# Patient Record
Sex: FEMALE | Race: WHITE | NOT HISPANIC OR LATINO | Employment: FULL TIME | URBAN - METROPOLITAN AREA
[De-identification: names, ages, dates, MRNs, and addresses within clinical notes are randomized per-mention and may not be internally consistent; named-entity substitution may affect disease eponyms.]

---

## 2018-04-18 ENCOUNTER — APPOINTMENT (OUTPATIENT)
Dept: RADIOLOGY | Facility: CLINIC | Age: 43
End: 2018-04-18
Payer: COMMERCIAL

## 2018-04-18 ENCOUNTER — OFFICE VISIT (OUTPATIENT)
Dept: URGENT CARE | Facility: CLINIC | Age: 43
End: 2018-04-18
Payer: COMMERCIAL

## 2018-04-18 VITALS
HEIGHT: 63 IN | SYSTOLIC BLOOD PRESSURE: 124 MMHG | WEIGHT: 170 LBS | DIASTOLIC BLOOD PRESSURE: 82 MMHG | HEART RATE: 105 BPM | TEMPERATURE: 98.6 F | RESPIRATION RATE: 18 BRPM | OXYGEN SATURATION: 96 % | BODY MASS INDEX: 30.12 KG/M2

## 2018-04-18 DIAGNOSIS — S99.912A LEFT ANKLE INJURY, INITIAL ENCOUNTER: Primary | ICD-10-CM

## 2018-04-18 PROCEDURE — 73610 X-RAY EXAM OF ANKLE: CPT

## 2018-04-18 PROCEDURE — 73630 X-RAY EXAM OF FOOT: CPT

## 2018-04-18 PROCEDURE — 99213 OFFICE O/P EST LOW 20 MIN: CPT | Performed by: PHYSICIAN ASSISTANT

## 2018-04-18 RX ORDER — CETIRIZINE HYDROCHLORIDE 10 MG/1
10 TABLET ORAL DAILY
COMMUNITY
End: 2019-10-03 | Stop reason: ALTCHOICE

## 2018-04-18 RX ORDER — IBUPROFEN 800 MG/1
800 TABLET ORAL EVERY 6 HOURS PRN
Qty: 30 TABLET | Refills: 0 | Status: SHIPPED | OUTPATIENT
Start: 2018-04-18 | End: 2019-10-03 | Stop reason: ALTCHOICE

## 2018-04-18 NOTE — PATIENT INSTRUCTIONS
Office x-ray ankle/foot performed-no acute osseous abnormality visualized  Most likely ankle sprain  Left foot air cast applied to ankle  Ibuprofen 800 mg prescription sent to pharmacy  Rest, ice, elevate leg  Follow up with orthopedic physician if symptoms do not improve in approximately 1 week  Follow up with PCP in 3-5 days  Proceed to  ER if symptoms worsen  Ankle Sprain   AMBULATORY CARE:   An ankle sprain  happens when 1 or more ligaments in your ankle joint stretch or tear  Ligaments are tough tissues that connect bones  Ligaments support your joints and keep your bones in place  Common symptoms include the following:   · Trouble moving your ankle or foot    · Pain when you touch or put weight on your ankle    · Bruised, swollen, or misshapen ankle  Seek care immediately if:   · You have severe pain in your ankle  · Your foot or toes are cold or numb  · Your ankle becomes more weak or unstable (wobbly)  · You are unable to put any weight on your ankle or foot  · Your swelling has increased or returned  Contact your healthcare provider if:   · Your pain does not go away, even after treatment  · You have questions or concerns about your condition or care  Treatment:   · Medicines      ¨ NSAIDs , such as ibuprofen, help decrease swelling, pain, and fever  This medicine is available with or without a doctor's order  NSAIDs can cause stomach bleeding or kidney problems in certain people  If you take blood thinner medicine, always ask your healthcare provider if NSAIDs are safe for you  Always read the medicine label and follow directions  ¨ Acetaminophen  decreases pain  It is available without a doctor's order  Ask how much to take and how often to take it  Follow directions  Acetaminophen can cause liver damage if not taken correctly  ¨ Prescription pain medicine  may be given  Ask how to take this medicine safely      · Surgery  may be needed to repair or replace a torn ligament if your sprain does not heal with other treatments  Your healthcare provider may use screws to attach the bones in your ankle together  The screws may help support your ankle and make it stable  Ask your healthcare provider for more information about surgery to treat your ankle sprain  Self care:   · Use support devices,  such as a brace, cast, or splint, may be needed to limit your movement and protect your joint  You may need to use crutches to decrease your pain as you move around  · Go to physical therapy as directed  A physical therapist teaches you exercises to help improve movement and strength, and to decrease pain  · Rest  your ankle so that it can heal  Return to normal activities as directed  · Apply ice on your ankle for 15 to 20 minutes every hour or as directed  Use an ice pack, or put crushed ice in a plastic bag  Cover it with a towel  Ice helps prevent tissue damage and decreases swelling and pain  · Compress  your ankle  Ask if you should wrap an elastic bandage around your injured ligament  An elastic bandage provides support and helps decrease swelling and movement so your joint can heal  Wear as long as directed  · Elevate  your ankle above the level of your heart as often as you can  This will help decrease swelling and pain  Prop your ankle on pillows or blankets to keep it elevated comfortably  Prevent another ankle sprain:   · Let your ankle heal   Find out how long your ligament needs to heal  Do not do any physical activity until your healthcare provider says it is okay  If you start activity too soon, you may develop a more serious injury  · Always warm up and stretch  before you exercise or play sports  · Use the right equipment  Always wear shoes that fit well and are made for the activity that you are doing  You may also need ankle supports, elbow and knee pads, or braces    Follow up with your healthcare provider as directed:  Write down your questions so you remember to ask them during your visits  © 2017 [employer] Information is for End User's use only and may not be sold, redistributed or otherwise used for commercial purposes  All illustrations and images included in CareNotes® are the copyrighted property of [employer]  or Emeka Quintanilla  The above information is an  only  It is not intended as medical advice for individual conditions or treatments  Talk to your doctor, nurse or pharmacist before following any medical regimen to see if it is safe and effective for you

## 2018-04-18 NOTE — PROGRESS NOTES
[facility]        NAME: Jack Cheema is a 41 y o  female  : 1975    MRN: [id number]  DATE: 2018  TIME: 9:56 AM    Assessment and Plan   Left ankle injury, initial encounter [S99 912A]  1  Left ankle injury, initial encounter  XR ankle 3+ vw left    XR foot 3+ vw left         Patient Instructions   In office x-ray ankle/foot performed-no acute osseous abnormality  Left foot air cast applied to ankle  Ibuprofen 800 mg prescription sent to pharmacy  Rest, ice, elevate leg  Follow up with orthopedic physician if symptoms do not improve in approximately 1 week  Follow up with PCP in 3-5 days  Proceed to  ER if symptoms worsen  Chief Complaint     Chief Complaint   Patient presents with    Ankle Pain     L ankle  twisted her ankle this morning coming off step  History of Present Illness   The patient is a 44-year-old female who presents with left ankle pain after an injury that occurred this morning  She states that she was stepping off of a step and twisted her ankle  She had immediate pain and thinks that she heard a "pop" when it happened  Positive swelling and mild redness  She denies any prior injury to her ankle in the past   Negative numbness and tingling  Negative radiating pain  Decreased range of motion secondary to pain  Negative syncope  Negative head injury  She denies any pain or injury to other parts of her body  HPI    Review of Systems   Review of Systems   Constitutional: Negative for fever  Respiratory: Negative for shortness of breath  Cardiovascular: Negative for chest pain  Musculoskeletal: Positive for arthralgias, gait problem and joint swelling  Skin: Negative for color change, rash and wound  Neurological: Negative for dizziness, syncope, weakness, light-headedness, numbness and headaches  All other systems reviewed and are negative          Current Medications       Current Outpatient Prescriptions:     cetirizine (ZyrTEC) 10 mg tablet, Take 10 mg by mouth daily, Disp: , Rfl:     Current Allergies     Allergies as of 2018    (No Known Allergies)            The following portions of the patient's history were reviewed and updated as appropriate: allergies, current medications, past family history, past medical history, past social history, past surgical history and problem list      Past Medical History:   Diagnosis Date    Arthritis        Past Surgical History:   Procedure Laterality Date     SECTION      x2       Family History   Problem Relation Age of Onset    No Known Problems Mother     Heart disease Father          Medications have been verified  Objective   /82 (BP Location: Left arm, Patient Position: Sitting, Cuff Size: Standard)   Pulse 105   Temp 98 6 °F (37 °C) (Tympanic)   Resp 18   Ht 5' 3" (1 6 m)   Wt 77 1 kg (170 lb)   LMP 2018   SpO2 96%   BMI 30 11 kg/m²        Physical Exam     Physical Exam   Constitutional: She is oriented to person, place, and time  She appears well-developed and well-nourished  No distress  HENT:   Head: Normocephalic and atraumatic  Eyes: Conjunctivae and EOM are normal  Pupils are equal, round, and reactive to light  Right eye exhibits no discharge  Left eye exhibits no discharge  No scleral icterus  Musculoskeletal:        Left ankle: She exhibits decreased range of motion  She exhibits no swelling, no ecchymosis, no deformity, no laceration and normal pulse  Tenderness  Lateral malleolus tenderness found  No medial malleolus and no proximal fibula tenderness found  Achilles tendon normal         Feet:    Neurological: She is alert and oriented to person, place, and time  Skin: Skin is warm and dry  No rash noted  She is not diaphoretic  No erythema  No pallor

## 2019-10-03 ENCOUNTER — APPOINTMENT (OUTPATIENT)
Dept: LAB | Facility: CLINIC | Age: 44
End: 2019-10-03
Payer: COMMERCIAL

## 2019-10-03 ENCOUNTER — OFFICE VISIT (OUTPATIENT)
Dept: FAMILY MEDICINE CLINIC | Facility: CLINIC | Age: 44
End: 2019-10-03
Payer: COMMERCIAL

## 2019-10-03 VITALS
TEMPERATURE: 99.1 F | OXYGEN SATURATION: 98 % | HEART RATE: 92 BPM | WEIGHT: 177 LBS | BODY MASS INDEX: 30.22 KG/M2 | HEIGHT: 64 IN | SYSTOLIC BLOOD PRESSURE: 120 MMHG | DIASTOLIC BLOOD PRESSURE: 82 MMHG

## 2019-10-03 DIAGNOSIS — Z12.4 SCREENING FOR CERVICAL CANCER: ICD-10-CM

## 2019-10-03 DIAGNOSIS — Z12.39 SCREENING FOR BREAST CANCER: ICD-10-CM

## 2019-10-03 DIAGNOSIS — Z23 NEED FOR TDAP VACCINATION: ICD-10-CM

## 2019-10-03 DIAGNOSIS — Z00.00 HEALTHCARE MAINTENANCE: Primary | ICD-10-CM

## 2019-10-03 PROBLEM — M25.579 ANKLE PAIN: Status: RESOLVED | Noted: 2018-04-18 | Resolved: 2019-10-03

## 2019-10-03 PROBLEM — M17.0 BILATERAL PRIMARY OSTEOARTHRITIS OF KNEE: Status: ACTIVE | Noted: 2019-10-03

## 2019-10-03 PROBLEM — M25.579 ANKLE PAIN: Status: ACTIVE | Noted: 2018-04-18

## 2019-10-03 LAB
ALBUMIN SERPL BCP-MCNC: 4.1 G/DL (ref 3.5–5)
ALP SERPL-CCNC: 71 U/L (ref 46–116)
ALT SERPL W P-5'-P-CCNC: 63 U/L (ref 12–78)
ANION GAP SERPL CALCULATED.3IONS-SCNC: 6 MMOL/L (ref 4–13)
AST SERPL W P-5'-P-CCNC: 39 U/L (ref 5–45)
BILIRUB SERPL-MCNC: 0.55 MG/DL (ref 0.2–1)
BUN SERPL-MCNC: 8 MG/DL (ref 5–25)
CALCIUM SERPL-MCNC: 8.9 MG/DL (ref 8.3–10.1)
CHLORIDE SERPL-SCNC: 107 MMOL/L (ref 100–108)
CHOLEST SERPL-MCNC: 189 MG/DL (ref 50–200)
CO2 SERPL-SCNC: 26 MMOL/L (ref 21–32)
CREAT SERPL-MCNC: 0.81 MG/DL (ref 0.6–1.3)
GFR SERPL CREATININE-BSD FRML MDRD: 90 ML/MIN/1.73SQ M
GLUCOSE P FAST SERPL-MCNC: 73 MG/DL (ref 65–99)
HDLC SERPL-MCNC: 42 MG/DL (ref 40–60)
LDLC SERPL CALC-MCNC: 124 MG/DL (ref 0–100)
NONHDLC SERPL-MCNC: 147 MG/DL
POTASSIUM SERPL-SCNC: 4.3 MMOL/L (ref 3.5–5.3)
PROT SERPL-MCNC: 7.5 G/DL (ref 6.4–8.2)
SODIUM SERPL-SCNC: 139 MMOL/L (ref 136–145)
TRIGL SERPL-MCNC: 113 MG/DL

## 2019-10-03 PROCEDURE — 80061 LIPID PANEL: CPT

## 2019-10-03 PROCEDURE — 80053 COMPREHEN METABOLIC PANEL: CPT

## 2019-10-03 PROCEDURE — 99386 PREV VISIT NEW AGE 40-64: CPT | Performed by: FAMILY MEDICINE

## 2019-10-03 PROCEDURE — 36415 COLL VENOUS BLD VENIPUNCTURE: CPT

## 2019-10-03 PROCEDURE — 90471 IMMUNIZATION ADMIN: CPT

## 2019-10-03 PROCEDURE — 90715 TDAP VACCINE 7 YRS/> IM: CPT

## 2019-10-03 PROCEDURE — G0145 SCR C/V CYTO,THINLAYER,RESCR: HCPCS | Performed by: FAMILY MEDICINE

## 2019-10-03 PROCEDURE — 87624 HPV HI-RISK TYP POOLED RSLT: CPT | Performed by: FAMILY MEDICINE

## 2019-10-03 RX ORDER — MELOXICAM 15 MG/1
TABLET ORAL DAILY
COMMUNITY
End: 2019-10-03 | Stop reason: ALTCHOICE

## 2019-10-03 NOTE — PROGRESS NOTES
Alison Lozano 1975 female MRN: [id number]    Family Medicine Annual GYN Visit    ASSESSMENT/PLAN  Problem List Items Addressed This Visit        Other    Healthcare maintenance - Primary    BMI 30 0-30 9,adult    Relevant Orders    Comprehensive metabolic panel    Lipid panel    Screening for cervical cancer    Relevant Orders    Liquid-based pap, screening      Other Visit Diagnoses     Screening for breast cancer        Relevant Orders    Mammo screening bilateral w 3d & cad          Exam benign  PAP collected  Pt deferred STD testing  Mammo script provided  Future Appointments   Date Time Provider Anahy Sharma   10/3/2019  9:00 AM DO [facility]          SUBJECTIVE  CC: Establish Care (physical )      HPI:  Alison Lozano is a 45 y o  female who presents for annual gynecologic exam      Gynecologic History  OB History        2    Para   2    Term                AB   0    Living           SAB   0    TAB        Ectopic        Multiple        Live Births                   No LMP recorded  Last Pap: 11 years ago  Results were: normal  Last mammogram: N/A  Results were: N/A        Review of Systems   Genitourinary: Negative for dyspareunia, dysuria, frequency, menstrual problem, pelvic pain, urgency, vaginal discharge and vaginal pain  Historical Information   The patient history was reviewed as follows:    Past Medical History:   Diagnosis Date    Arthritis      Past Surgical History:   Procedure Laterality Date     SECTION      x2    FOOT SURGERY       Family History   Problem Relation Age of Onset    No Known Problems Mother     Heart disease Father       Social History       Medications:   No current outpatient medications on file      No Known Allergies    OBJECTIVE  Vitals:   Vitals:    10/03/19 0827   BP: 120/82   BP Location: Left arm   Patient Position: Sitting   Cuff Size: Adult   Pulse: 92   Temp: 99 1 °F (37 3 °C)   SpO2: 98% Weight: 80 3 kg (177 lb)   Height: 5' 3 5" (1 613 m)         Physical Exam   Pulmonary/Chest:   Pt deferred breast exam     Genitourinary: Rectum normal  There is no rash or lesion on the right labia  There is no rash or lesion on the left labia  Uterus is not tender  Cervix exhibits discharge (scant thin white)  Cervix exhibits no motion tenderness  Right adnexum displays no mass, no tenderness and no fullness  Left adnexum displays no mass, no tenderness and no fullness  No bleeding in the vagina  No vaginal discharge found                    DO [facility]   10/3/2019  8:57 AM

## 2019-10-03 NOTE — PROGRESS NOTES
Alton Paget 1975 female MRN: [id number]      ASSESSMENT/PLAN  Problem List Items Addressed This Visit        Other    Healthcare maintenance - Primary    BMI 30 0-30 9,adult    Relevant Orders    Comprehensive metabolic panel    Lipid panel    Screening for cervical cancer    Relevant Orders    Liquid-based pap, screening      Other Visit Diagnoses     Screening for breast cancer        Relevant Orders    Mammo screening bilateral w 3d & cad    Need for Tdap vaccination        Relevant Orders    TDAP VACCINE GREATER THAN OR EQUAL TO 8YO IM (Completed)        BMI 30: CMP + Lipids   BP WNL     BMI Counseling: Body mass index is 30 86 kg/m²  The BMI is above normal  Nutrition recommendations include reducing portion sizes, decreasing overall calorie intake, 3-5 servings of fruits/vegetables daily, reducing fast food intake, consuming healthier snacks, decreasing soda and/or juice intake, moderation in carbohydrate intake, increasing intake of lean protein and reducing intake of saturated fat and trans fat  Exercise recommendations include exercising 3-5 times per week  No future appointments  SUBJECTIVE  CC: Establish Care (physical )      HPI:  Alton Paget is a 45 y o  female who presents to establish care  History reviewed and updated as below  No acute concerns  Last PAP 11 years ago       Review of Systems   Constitutional: Negative for unexpected weight change  HENT: Negative for congestion, ear pain, rhinorrhea and sore throat  Eyes: Negative for visual disturbance  Respiratory: Negative for cough and shortness of breath  Cardiovascular: Negative for chest pain, palpitations and leg swelling  Gastrointestinal: Negative for abdominal pain, constipation and diarrhea  Musculoskeletal: Positive for arthralgias  Neurological: Negative for dizziness, light-headedness and headaches         Historical Information   The patient history was reviewed and updated as follows:    Past Medical History:   Diagnosis Date    Arthritis      Past Surgical History:   Procedure Laterality Date     SECTION      x2    FOOT SURGERY       Family History   Problem Relation Age of Onset    No Known Problems Mother     Heart disease Father       Social History   Social History     Substance and Sexual Activity   Alcohol Use Yes    Frequency: Monthly or less     Social History     Substance and Sexual Activity   Drug Use No     Social History     Tobacco Use   Smoking Status Passive Smoke Exposure - Never Smoker   Smokeless Tobacco Never Used       Medications:   No current outpatient medications on file  No Known Allergies    OBJECTIVE    Vitals:   Vitals:    10/03/19 0827   BP: 120/82   BP Location: Left arm   Patient Position: Sitting   Cuff Size: Adult   Pulse: 92   Temp: 99 1 °F (37 3 °C)   SpO2: 98%   Weight: 80 3 kg (177 lb)   Height: 5' 3 5" (1 613 m)           Physical Exam   Constitutional: She appears well-developed and well-nourished  No distress  HENT:   Head: Normocephalic and atraumatic  Right Ear: External ear normal    Left Ear: External ear normal    Nose: Nose normal    Mouth/Throat: Oropharynx is clear and moist    Eyes: Conjunctivae are normal    Neck: No thyromegaly present  Cardiovascular: Normal rate and regular rhythm  Pulmonary/Chest: Effort normal and breath sounds normal  No respiratory distress  Abdominal: Soft  Bowel sounds are normal  She exhibits no distension  There is no tenderness  Musculoskeletal: She exhibits no edema  Lymphadenopathy:     She has no cervical adenopathy  Neurological: She is alert  Skin: Skin is warm and dry  Psychiatric: She has a normal mood and affect  Vitals reviewed                   DO [facility]   10/3/2019  9:04 AM

## 2019-10-04 LAB
HPV HR 12 DNA CVX QL NAA+PROBE: NEGATIVE
HPV16 DNA CVX QL NAA+PROBE: NEGATIVE
HPV18 DNA CVX QL NAA+PROBE: NEGATIVE

## 2019-10-09 LAB
LAB AP GYN PRIMARY INTERPRETATION: NORMAL
Lab: NORMAL

## 2020-09-02 ENCOUNTER — TELEPHONE (OUTPATIENT)
Dept: DERMATOLOGY | Facility: CLINIC | Age: 45
End: 2020-09-02

## 2020-09-02 NOTE — TELEPHONE ENCOUNTER
Return call made to patient, LVM asking for her to call us back if she would like to be added on the wait list

## 2020-09-19 ENCOUNTER — OFFICE VISIT (OUTPATIENT)
Dept: URGENT CARE | Facility: CLINIC | Age: 45
End: 2020-09-19
Payer: COMMERCIAL

## 2020-09-19 VITALS
OXYGEN SATURATION: 97 % | RESPIRATION RATE: 18 BRPM | BODY MASS INDEX: 30.3 KG/M2 | DIASTOLIC BLOOD PRESSURE: 96 MMHG | HEART RATE: 91 BPM | WEIGHT: 171 LBS | HEIGHT: 63 IN | TEMPERATURE: 98 F | SYSTOLIC BLOOD PRESSURE: 140 MMHG

## 2020-09-19 DIAGNOSIS — M54.2 NECK PAIN: Primary | ICD-10-CM

## 2020-09-19 PROCEDURE — 99213 OFFICE O/P EST LOW 20 MIN: CPT | Performed by: PHYSICIAN ASSISTANT

## 2020-09-19 RX ORDER — METHYLPREDNISOLONE 4 MG/1
TABLET ORAL
Qty: 1 EACH | Refills: 0 | Status: SHIPPED | OUTPATIENT
Start: 2020-09-19

## 2020-09-19 RX ORDER — CYCLOBENZAPRINE HCL 10 MG
10 TABLET ORAL 3 TIMES DAILY PRN
Qty: 21 TABLET | Refills: 0 | Status: SHIPPED | OUTPATIENT
Start: 2020-09-19

## 2020-09-19 NOTE — PROGRESS NOTES
[facility]        NAME: Antwon Clark is a 39 y o  female  : 1975    MRN: [id number]  DATE: 2020  TIME: 3:17 PM    Assessment and Plan   Neck pain [M54 2]  1  Neck pain  methylPREDNISolone 4 MG tablet therapy pack    cyclobenzaprine (FLEXERIL) 10 mg tablet     Discussed with patient if rash appears she is to f/u with pcp or return  Likely MSK vs shingles Pain consistent with shingles however no rash  Will tx with steroids and muscle relaxer and given close f/u instructions    Patient Instructions     Follow up with PCP in 3-5 days  Proceed to  ER if symptoms worsen  Chief Complaint     Chief Complaint   Patient presents with    Shoulder Pain     Pt c/o left shoulder pain that is traveling up into neck, no injury, pain started yesterday and getting worse  History of Present Illness       27-year-old female presents for evaluation of left upper shoulder pain  Patient states he had sharp and burning pain in the right upper shoulder into her neck  Patient states she has no known injury at this time or previous injury  Patient denies any rash  Denies fever chest pain      Review of Systems   Review of Systems   Constitutional: Negative for chills, fatigue and fever  HENT: Negative for congestion, ear pain, sinus pain, sore throat and trouble swallowing  Eyes: Negative for pain, discharge and redness  Respiratory: Negative for cough, chest tightness, shortness of breath and wheezing  Cardiovascular: Negative for chest pain, palpitations and leg swelling  Gastrointestinal: Negative for abdominal pain, diarrhea, nausea and vomiting  Musculoskeletal: Positive for neck pain  Negative for arthralgias, joint swelling and myalgias  Skin: Negative for rash  Neurological: Negative for dizziness, weakness, numbness and headaches           Current Medications       Current Outpatient Medications:     cyclobenzaprine (FLEXERIL) 10 mg tablet, Take 1 tablet (10 mg total) by mouth 3 (three) times a day as needed for muscle spasms, Disp: 21 tablet, Rfl: 0    methylPREDNISolone 4 MG tablet therapy pack, Use as directed on package, Disp: 1 each, Rfl: 0    Current Allergies     Allergies as of 2020    (No Known Allergies)            The following portions of the patient's history were reviewed and updated as appropriate: allergies, current medications, past family history, past medical history, past social history, past surgical history and problem list      Past Medical History:   Diagnosis Date    Arthritis        Past Surgical History:   Procedure Laterality Date     SECTION      x2    FOOT SURGERY         Family History   Problem Relation Age of Onset    No Known Problems Mother     Heart disease Father          Medications have been verified  Objective   /96   Pulse 91   Temp 98 °F (36 7 °C) (Tympanic)   Resp 18   Ht 5' 3" (1 6 m)   Wt 77 6 kg (171 lb)   SpO2 97%   BMI 30 29 kg/m²        Physical Exam     Physical Exam  Constitutional:       General: She is not in acute distress  Appearance: She is well-developed  Cardiovascular:      Rate and Rhythm: Normal rate and regular rhythm  Heart sounds: Normal heart sounds  Pulmonary:      Effort: Pulmonary effort is normal       Breath sounds: Normal breath sounds     Musculoskeletal:        Arms:

## 2020-10-26 ENCOUNTER — TELEPHONE (OUTPATIENT)
Dept: FAMILY MEDICINE CLINIC | Facility: CLINIC | Age: 45
End: 2020-10-26

## 2022-04-03 ENCOUNTER — OFFICE VISIT (OUTPATIENT)
Dept: URGENT CARE | Facility: CLINIC | Age: 47
End: 2022-04-03
Payer: COMMERCIAL

## 2022-04-03 VITALS
RESPIRATION RATE: 14 BRPM | HEART RATE: 88 BPM | TEMPERATURE: 97.8 F | DIASTOLIC BLOOD PRESSURE: 78 MMHG | SYSTOLIC BLOOD PRESSURE: 120 MMHG | OXYGEN SATURATION: 98 %

## 2022-04-03 DIAGNOSIS — S29.012A UPPER BACK STRAIN, INITIAL ENCOUNTER: Primary | ICD-10-CM

## 2022-04-03 DIAGNOSIS — S16.1XXA STRAIN OF NECK MUSCLE, INITIAL ENCOUNTER: ICD-10-CM

## 2022-04-03 PROCEDURE — 99213 OFFICE O/P EST LOW 20 MIN: CPT | Performed by: EMERGENCY MEDICINE

## 2022-04-03 RX ORDER — IBUPROFEN 800 MG/1
800 TABLET ORAL 2 TIMES DAILY
Qty: 20 TABLET | Refills: 0 | Status: SHIPPED | OUTPATIENT
Start: 2022-04-03 | End: 2022-04-13

## 2022-04-03 RX ORDER — CYCLOBENZAPRINE HCL 10 MG
10 TABLET ORAL
Qty: 10 TABLET | Refills: 0 | Status: SHIPPED | OUTPATIENT
Start: 2022-04-03

## 2022-04-03 NOTE — PROGRESS NOTES
[facility]        NAME: Donna Phillips is a 41 y o  female  : 1975    MRN: [id number]  DATE: April 3, 2022  TIME: 10:35 AM    Assessment and Plan   Upper back strain, initial encounter [P92 [id number]]  1  Upper back strain, initial encounter  ibuprofen (MOTRIN) 800 mg tablet    cyclobenzaprine (FLEXERIL) 10 mg tablet   2  Strain of neck muscle, initial encounter  ibuprofen (MOTRIN) 800 mg tablet    cyclobenzaprine (FLEXERIL) 10 mg tablet         Patient Instructions   Patient Instructions     1  Ice x 20 min at a time x 3-4 x / day x 3 days, then heat thereafter  2  Try to avoid bending or lifting  3  F/u with PCP or ortho Dr  If pain continues in 3-5 days  4  Check for any blister like rash       Thoracic Back Strain   WHAT YOU NEED TO KNOW:   A thoracic back strain is a muscle or tendon injury in your upper or middle back  You may have pain, muscle spasms, swelling, or stiffness  A mild strain may cause minor pain that goes away in a few days  A more severe strain may cause the muscle or tendon to tear  There is a very small chance you may need surgery to fix the tear  DISCHARGE INSTRUCTIONS:   Call your local emergency number (911 in the [address] [place] [address],[address]) for any of the following:   · You have chest pain or shortness of breath  Return to the emergency department if:   · You have severe pain, or pain that spreads from your back to other areas  · You have new or increased swelling or redness in the injured area  Call your doctor if:   · You have questions or concerns about your condition or care  Medicines: You may need any of the following:  · Prescription pain medicine  may be given  Ask your healthcare provider how to take this medicine safely  Some prescription pain medicines contain acetaminophen  Do not take other medicines that contain acetaminophen without talking to your healthcare provider  Too much acetaminophen may cause liver damage   Prescription pain medicine may cause constipation  Ask your healthcare provider how to prevent or treat constipation  · NSAIDs , such as ibuprofen, help decrease swelling, pain, and fever  This medicine is available with or without a doctor's order  NSAIDs can cause stomach bleeding or kidney problems in certain people  If you take blood thinner medicine, always ask your healthcare provider if NSAIDs are safe for you  Always read the medicine label and follow directions  · Muscle relaxers  help prevent or treat spasms  · Take your medicine as directed  Contact your healthcare provider if you think your medicine is not helping or if you have side effects  Tell him or her if you are allergic to any medicine  Keep a list of the medicines, vitamins, and herbs you take  Include the amounts, and when and why you take them  Bring the list or the pill bottles to follow-up visits  Carry your medicine list with you in case of an emergency  Self-care:   · Rest as directed  Move slowly and carefully  Do not lift heavy objects  · Apply ice or heat as directed  Ice decreases pain and swelling and may help decrease tissue damage  Heat helps decrease muscle spasms  Your healthcare provider may tell you to apply only ice for the first 24 hours to help reduce swelling  Apply ice or heat to the area for 20 minutes every hour, or as directed  Ask how many times to do this each day, and for how many days  · Use an elastic wrap or back brace as directed  These will help keep the injured area from moving so it can heal          · Go to physical therapy as directed  A physical therapist can teach you exercises to help strengthen your back  He or she can also teach you safe ways to bend and move so you do not cause more injury  Prevent another thoracic back strain:   · Lift objects carefully  Ask someone to help you lift heavy objects  If you must lift an object by yourself, do not use your back muscles to lift  Lift with your legs           · Check your posture  Keep your upper body lifted and your head up  Poor posture can cause back strain or make it worse  Adjust your position if you work in front of a computer  You may need arm or wrist supports or change the height of your chair  · Exercise as directed  Exercise can help strengthen your muscles and make you more flexible  Do not exercise or play sports when you are tired  Always warm up before you start and cool down when you finish  · Stretch your muscles as directed  Keep your muscles limber by stretching every day  Stretch before you exercise  Follow up with your doctor as directed: You may need more tests to check for other injuries or to see how your injury is healing  You may also need to see a specialist  Write down your questions so you remember to ask them during your visits  © Copyright [employer] 2022 Information is for End User's use only and may not be sold, redistributed or otherwise used for commercial purposes  All illustrations and images included in CareNotes® are the copyrighted property of A D A M , Inc  or [employer]   The above information is an  only  It is not intended as medical advice for individual conditions or treatments  Talk to your doctor, nurse or pharmacist before following any medical regimen to see if it is safe and effective for you  Cervical Strain   WHAT YOU NEED TO KNOW:   A cervical strain is a stretched or torn muscle or tendon in your neck  Tendons are strong tissues that connect muscles to bones  DISCHARGE INSTRUCTIONS:   Return to the emergency department if:   · You have pain or numbness from your shoulder down to your hand  · You have problems with your vision, hearing, or balance  · You feel confused or cannot concentrate  · You have problems with movement and strength  Call your doctor if:   · You have increased swelling or pain in your neck       · You have questions or concerns about your condition or care  Medicines: You may need any of the following:  · Acetaminophen  decreases pain and fever  It is available without a doctor's order  Ask how much to take and how often to take it  Follow directions  Read the labels of all other medicines you are using to see if they also contain acetaminophen, or ask your doctor or pharmacist  Acetaminophen can cause liver damage if not taken correctly  Do not use more than 4 grams (4,000 milligrams) total of acetaminophen in one day  · NSAIDs , such as ibuprofen, help decrease swelling, pain, and fever  This medicine is available with or without a doctor's order  NSAIDs can cause stomach bleeding or kidney problems in certain people  If you take blood thinner medicine, always ask your healthcare provider if NSAIDs are safe for you  Always read the medicine label and follow directions  · Muscle relaxers  help decrease pain and muscle spasms  · Prescription pain medicine  may be given  Ask your healthcare provider how to take this medicine safely  Some prescription pain medicines contain acetaminophen  Do not take other medicines that contain acetaminophen without talking to your healthcare provider  Too much acetaminophen may cause liver damage  Prescription pain medicine may cause constipation  Ask your healthcare provider how to prevent or treat constipation  · Take your medicine as directed  Contact your healthcare provider if you think your medicine is not helping or if you have side effects  Tell him or her if you are allergic to any medicine  Keep a list of the medicines, vitamins, and herbs you take  Include the amounts, and when and why you take them  Bring the list or the pill bottles to follow-up visits  Carry your medicine list with you in case of an emergency  Manage your symptoms:   · Apply heat  on your neck for 15 to 20 minutes, 4 to 6 times a day or as directed  Heat helps decrease pain, stiffness, and muscle spasms      · Begin gentle neck exercises  as soon as you can move your neck without pain  Exercises will help decrease stiffness and improve the strength and movement of your neck  Ask your healthcare provider what kind of exercises you should do  · Gradually return to your usual activities as directed  Stop if you have pain  Avoid activities that can cause more damage to your neck, such as heavy lifting or strenuous exercise  · Sleep without a pillow  to help decrease pain  Instead, roll a small towel tightly and place it under your neck  · Go to physical therapy as directed  A physical therapist teaches you exercises to help improve movement and strength, and to decrease pain  Prevent another neck injury:   · Drive safely  Make sure everyone in your car wears a seatbelt  A seatbelt can save your life if you are in an accident  Do not use your cell phone when you are driving  This could distract you and cause an accident  Pull over if you need to make a call or send a text message  · Wear helmets, lifejackets, and protective gear  Always wear a helmet when you ride a bike or motorcycle, go skiing, or play sports that could cause a head injury  Wear protective equipment when you play sports  Wear a lifejacket when you are on a boat or doing water sports  Follow up with your doctor as directed: You may be referred to an orthopedist or physical therapies  Write down your questions so you remember to ask them during your visits  © Copyright [employer] 2022 Information is for End User's use only and may not be sold, redistributed or otherwise used for commercial purposes  All illustrations and images included in CareNotes® are the copyrighted property of A D A M , Inc  or Maryan Tilley  The above information is an  only  It is not intended as medical advice for individual conditions or treatments   Talk to your doctor, nurse or pharmacist before following any medical regimen to see if it is safe and effective for you  Follow up with PCP in 3-5 days  Proceed to  ER if symptoms worsen  Chief Complaint     Chief Complaint   Patient presents with    Back Pain     upper back x6 days  Doing a lot of bending on monday  Pain with burning sensation  Took ibuprofen with no relief  History of Present Illness       28-year-old white female with a chief complaint of pain and neck pain  Patient describes it as a burning pain  Patient states she was bending over all day Monday taking care of hatching ducks, and since that time has had increased pain and burning in her upper back and neck region  Patient denies any numbness or tingling down her arms  Review of Systems   Review of Systems   Constitutional: Negative for chills and fever  HENT: Negative for congestion and rhinorrhea  Eyes: Negative for discharge and visual disturbance  Respiratory: Negative for shortness of breath and wheezing  Cardiovascular: Negative for chest pain and palpitations  Gastrointestinal: Negative for abdominal pain and vomiting  Endocrine: Negative for polydipsia and polyuria  Genitourinary: Negative for dysuria and hematuria  Musculoskeletal: Positive for arthralgias, myalgias and neck pain  Negative for gait problem and neck stiffness  Skin: Negative for rash and wound  Neurological: Negative for dizziness and headaches  Psychiatric/Behavioral: Negative for confusion and suicidal ideas           Current Medications       Current Outpatient Medications:     cyclobenzaprine (FLEXERIL) 10 mg tablet, Take 1 tablet (10 mg total) by mouth 3 (three) times a day as needed for muscle spasms (Patient not taking: Reported on 4/3/2022 ), Disp: 21 tablet, Rfl: 0    cyclobenzaprine (FLEXERIL) 10 mg tablet, Take 1 tablet (10 mg total) by mouth daily at bedtime, Disp: 10 tablet, Rfl: 0    ibuprofen (MOTRIN) 800 mg tablet, Take 1 tablet (800 mg total) by mouth 2 (two) times a day for 10 days With food, Disp: 20 tablet, Rfl: 0    methylPREDNISolone 4 MG tablet therapy pack, Use as directed on package (Patient not taking: Reported on 4/3/2022 ), Disp: 1 each, Rfl: 0    Current Allergies     Allergies as of 2022    (No Known Allergies)            The following portions of the patient's history were reviewed and updated as appropriate: allergies, current medications, past family history, past medical history, past social history, past surgical history and problem list      Past Medical History:   Diagnosis Date    Arthritis        Past Surgical History:   Procedure Laterality Date     SECTION      x2    FOOT SURGERY         Family History   Problem Relation Age of Onset    No Known Problems Mother     Heart disease Father          Medications have been verified  Objective   /78   Pulse 88   Temp 97 8 °F (36 6 °C)   Resp 14   SpO2 98%        Physical Exam     Physical Exam  Vitals and nursing note reviewed  Constitutional:       Appearance: Normal appearance  Comments: 62-year-old white female sitting on the stretcher with some upper back and neck pain  HENT:      Head: Normocephalic and atraumatic  Eyes:      Extraocular Movements: Extraocular movements intact  Cardiovascular:      Rate and Rhythm: Normal rate  Pulmonary:      Effort: Pulmonary effort is normal    Musculoskeletal:      Cervical back: Tenderness present  Comments: Neck: Patient has full range of motion in flexion and extension  Patient has some limited rotation to the right; full rotation to the left  Thoracic region:  Patient has tenderness to the upper thoracic paraspinal thoracic region 5 through 7 on the right as well as the paracervical region  I do not appreciate any rash consistent with herpes zoster at this time although patient describes a burning sensation  Pain in the neck and thoracic region increases with certain movements     Skin:     General: Skin is warm and dry    Neurological:      Mental Status: She is alert     Psychiatric:         Mood and Affect: Mood normal

## 2022-04-03 NOTE — PATIENT INSTRUCTIONS
1   Ice x 20 min at a time x 3-4 x / day x 3 days, then heat thereafter  2  Try to avoid bending or lifting  3  F/u with PCP or ortho Dr  If pain continues in 3-5 days  4  Check for any blister like rash       Thoracic Back Strain   WHAT YOU NEED TO KNOW:   A thoracic back strain is a muscle or tendon injury in your upper or middle back  You may have pain, muscle spasms, swelling, or stiffness  A mild strain may cause minor pain that goes away in a few days  A more severe strain may cause the muscle or tendon to tear  There is a very small chance you may need surgery to fix the tear  DISCHARGE INSTRUCTIONS:   Call your local emergency number (911 in the [facility],[address]) for any of the following:   · You have chest pain or shortness of breath  Return to the emergency department if:   · You have severe pain, or pain that spreads from your back to other areas  · You have new or increased swelling or redness in the injured area  Call your doctor if:   · You have questions or concerns about your condition or care  Medicines: You may need any of the following:  · Prescription pain medicine  may be given  Ask your healthcare provider how to take this medicine safely  Some prescription pain medicines contain acetaminophen  Do not take other medicines that contain acetaminophen without talking to your healthcare provider  Too much acetaminophen may cause liver damage  Prescription pain medicine may cause constipation  Ask your healthcare provider how to prevent or treat constipation  · NSAIDs , such as ibuprofen, help decrease swelling, pain, and fever  This medicine is available with or without a doctor's order  NSAIDs can cause stomach bleeding or kidney problems in certain people  If you take blood thinner medicine, always ask your healthcare provider if NSAIDs are safe for you  Always read the medicine label and follow directions  · Muscle relaxers  help prevent or treat spasms      · Take your medicine as directed  Contact your healthcare provider if you think your medicine is not helping or if you have side effects  Tell him or her if you are allergic to any medicine  Keep a list of the medicines, vitamins, and herbs you take  Include the amounts, and when and why you take them  Bring the list or the pill bottles to follow-up visits  Carry your medicine list with you in case of an emergency  Self-care:   · Rest as directed  Move slowly and carefully  Do not lift heavy objects  · Apply ice or heat as directed  Ice decreases pain and swelling and may help decrease tissue damage  Heat helps decrease muscle spasms  Your healthcare provider may tell you to apply only ice for the first 24 hours to help reduce swelling  Apply ice or heat to the area for 20 minutes every hour, or as directed  Ask how many times to do this each day, and for how many days  · Use an elastic wrap or back brace as directed  These will help keep the injured area from moving so it can heal          · Go to physical therapy as directed  A physical therapist can teach you exercises to help strengthen your back  He or she can also teach you safe ways to bend and move so you do not cause more injury  Prevent another thoracic back strain:   · Lift objects carefully  Ask someone to help you lift heavy objects  If you must lift an object by yourself, do not use your back muscles to lift  Lift with your legs  · Check your posture  Keep your upper body lifted and your head up  Poor posture can cause back strain or make it worse  Adjust your position if you work in front of a computer  You may need arm or wrist supports or change the height of your chair  · Exercise as directed  Exercise can help strengthen your muscles and make you more flexible  Do not exercise or play sports when you are tired  Always warm up before you start and cool down when you finish  · Stretch your muscles as directed    Keep your muscles limber by stretching every day  Stretch before you exercise  Follow up with your doctor as directed: You may need more tests to check for other injuries or to see how your injury is healing  You may also need to see a specialist  Write down your questions so you remember to ask them during your visits  © Copyright [address] 2022 Information is for End User's use only and may not be sold, redistributed or otherwise used for commercial purposes  All illustrations and images included in CareNotes® are the copyrighted property of A D A M , Inc  or [facility] Ayleen Coats   The above information is an  only  It is not intended as medical advice for individual conditions or treatments  Talk to your doctor, nurse or pharmacist before following any medical regimen to see if it is safe and effective for you  Cervical Strain   WHAT YOU NEED TO KNOW:   A cervical strain is a stretched or torn muscle or tendon in your neck  Tendons are strong tissues that connect muscles to bones  DISCHARGE INSTRUCTIONS:   Return to the emergency department if:   · You have pain or numbness from your shoulder down to your hand  · You have problems with your vision, hearing, or balance  · You feel confused or cannot concentrate  · You have problems with movement and strength  Call your doctor if:   · You have increased swelling or pain in your neck  · You have questions or concerns about your condition or care  Medicines: You may need any of the following:  · Acetaminophen  decreases pain and fever  It is available without a doctor's order  Ask how much to take and how often to take it  Follow directions  Read the labels of all other medicines you are using to see if they also contain acetaminophen, or ask your doctor or pharmacist  Acetaminophen can cause liver damage if not taken correctly  Do not use more than 4 grams (4,000 milligrams) total of acetaminophen in one day       · NSAIDs , such as ibuprofen, help decrease swelling, pain, and fever  This medicine is available with or without a doctor's order  NSAIDs can cause stomach bleeding or kidney problems in certain people  If you take blood thinner medicine, always ask your healthcare provider if NSAIDs are safe for you  Always read the medicine label and follow directions  · Muscle relaxers  help decrease pain and muscle spasms  · Prescription pain medicine  may be given  Ask your healthcare provider how to take this medicine safely  Some prescription pain medicines contain acetaminophen  Do not take other medicines that contain acetaminophen without talking to your healthcare provider  Too much acetaminophen may cause liver damage  Prescription pain medicine may cause constipation  Ask your healthcare provider how to prevent or treat constipation  · Take your medicine as directed  Contact your healthcare provider if you think your medicine is not helping or if you have side effects  Tell him or her if you are allergic to any medicine  Keep a list of the medicines, vitamins, and herbs you take  Include the amounts, and when and why you take them  Bring the list or the pill bottles to follow-up visits  Carry your medicine list with you in case of an emergency  Manage your symptoms:   · Apply heat  on your neck for 15 to 20 minutes, 4 to 6 times a day or as directed  Heat helps decrease pain, stiffness, and muscle spasms  · Begin gentle neck exercises  as soon as you can move your neck without pain  Exercises will help decrease stiffness and improve the strength and movement of your neck  Ask your healthcare provider what kind of exercises you should do  · Gradually return to your usual activities as directed  Stop if you have pain  Avoid activities that can cause more damage to your neck, such as heavy lifting or strenuous exercise  · Sleep without a pillow  to help decrease pain   Instead, roll a small towel tightly and place it under your neck  · Go to physical therapy as directed  A physical therapist teaches you exercises to help improve movement and strength, and to decrease pain  Prevent another neck injury:   · Drive safely  Make sure everyone in your car wears a seatbelt  A seatbelt can save your life if you are in an accident  Do not use your cell phone when you are driving  This could distract you and cause an accident  Pull over if you need to make a call or send a text message  · Wear helmets, lifejackets, and protective gear  Always wear a helmet when you ride a bike or motorcycle, go skiing, or play sports that could cause a head injury  Wear protective equipment when you play sports  Wear a lifejacket when you are on a boat or doing water sports  Follow up with your doctor as directed: You may be referred to an orthopedist or physical therapies  Write down your questions so you remember to ask them during your visits  © Copyright [employer] 2022 Information is for End User's use only and may not be sold, redistributed or otherwise used for commercial purposes  All illustrations and images included in CareNotes® are the copyrighted property of [employer]  or [facility] Ayleen Coats   The above information is an  only  It is not intended as medical advice for individual conditions or treatments  Talk to your doctor, nurse or pharmacist before following any medical regimen to see if it is safe and effective for you

## 2022-12-02 ENCOUNTER — OFFICE VISIT (OUTPATIENT)
Dept: URGENT CARE | Facility: CLINIC | Age: 47
End: 2022-12-02

## 2022-12-02 VITALS — RESPIRATION RATE: 18 BRPM | HEART RATE: 105 BPM | OXYGEN SATURATION: 99 % | TEMPERATURE: 98.5 F

## 2022-12-02 DIAGNOSIS — S16.1XXA STRAIN OF MUSCLE, FASCIA AND TENDON AT NECK LEVEL, INITIAL ENCOUNTER: Primary | ICD-10-CM

## 2022-12-02 RX ORDER — METHYLPREDNISOLONE 4 MG/1
TABLET ORAL
Qty: 21 EACH | Refills: 0 | Status: SHIPPED | OUTPATIENT
Start: 2022-12-02

## 2022-12-02 NOTE — PROGRESS NOTES
[facility]        NAME: Caroline Gentile is a 41 y o  female  : 1975    MRN: [id number]  DATE: 2022  TIME: 8:51 AM    Assessment and Plan   Strain of muscle, fascia and tendon at neck level, initial encounter [S16  1XXA]  1  Strain of muscle, fascia and tendon at neck level, initial encounter  methylPREDNISolone 4 MG tablet therapy pack    Ambulatory Referral to Physical Therapy        Strain versus arthritis of the neck  Start steroids, she reports she did better last office visit when this was prescribed  Start on PT for symptoms  Follow up with orthopedics if symptoms do not improve  Patient Instructions     -Practice RICE : rest the injured area, apply ice, apply compression such as an ACE wrap to the site, and elevation- keep the injured area up     -For pain take an NSAID such as ibuprofen, Aleve, or motrin if able  This will decrease pain and swelling to the area  If unable to take, can try over the counter Voltaren cream instead  -If pain continues can also take these medications - can try over the counter medications (these do not need a prescription) such as acetaminophen (Tylenol), lidocaine or other pain patches, Voltaren cream, or lidocaine cream      -Follow up with orthopedics  There is an orthopedics site in the same building as the [facility] now call [phone] to schedule an appointment  Chief Complaint     Chief Complaint   Patient presents with   • Neck Pain     States neck pain since [place]  According to hx appears chronic  States she went to othro and recommended pt  States she did not go to pt bc she realized from the bill she was no longer in network  Given NSaid and muscle relaxer to which she states did not help her so she stopped taking  States pain is a constant dull pain with spasms  And does radiate down L arm   States worse when she is laying down as a result she is unable to sleep         History of Present Illness       Presents with pain since October, 2 months ago  Pain is constant, every day with some days worse than others  The pain radiates to her right arm  Pain is a spasm  She saw ortho but no longer under insurance plan so has not continued with them  They did neck xray which she reports to be negative, placed her on flexeril and diflenic which were not helping so she stopped taking them  They sent her to PT before additional testing but she was not covered under that plan so did not go yet  Denies known injury to the site  She has been seen x2 in office for similar issues that come and go  Review of Systems   Review of Systems   Constitutional: Negative for chills, fatigue and fever  HENT: Negative for congestion and sore throat  Respiratory: Negative for cough, shortness of breath and wheezing  Cardiovascular: Negative for chest pain  Gastrointestinal: Negative for abdominal pain  Genitourinary: Negative for dysuria  Musculoskeletal: Positive for neck pain  Negative for gait problem and myalgias  Neurological: Negative for dizziness  Psychiatric/Behavioral: Negative for confusion           Current Medications       Current Outpatient Medications:   •  methylPREDNISolone 4 MG tablet therapy pack, Use as directed on package, Disp: 21 each, Rfl: 0  •  cyclobenzaprine (FLEXERIL) 10 mg tablet, Take 1 tablet (10 mg total) by mouth 3 (three) times a day as needed for muscle spasms (Patient not taking: Reported on 4/3/2022 ), Disp: 21 tablet, Rfl: 0  •  cyclobenzaprine (FLEXERIL) 10 mg tablet, Take 1 tablet (10 mg total) by mouth daily at bedtime, Disp: 10 tablet, Rfl: 0  •  ibuprofen (MOTRIN) 800 mg tablet, Take 1 tablet (800 mg total) by mouth 2 (two) times a day for 10 days With food, Disp: 20 tablet, Rfl: 0    Current Allergies     Allergies as of 12/02/2022   • (No Known Allergies)            The following portions of the patient's history were reviewed and updated as appropriate: allergies, current medications, past family history, past medical history, past social history, past surgical history and problem list      Past Medical History:   Diagnosis Date   • Arthritis        Past Surgical History:   Procedure Laterality Date   •  SECTION      x2   • FOOT SURGERY         Family History   Problem Relation Age of Onset   • No Known Problems Mother    • Heart disease Father          Medications have been verified  Objective   Pulse 105   Temp 98 5 °F (36 9 °C)   Resp 18   SpO2 99%        Physical Exam     Physical Exam  Vitals reviewed  Constitutional:       Appearance: Normal appearance  Cardiovascular:      Rate and Rhythm: Normal rate and regular rhythm  Pulses: Normal pulses  Heart sounds: Normal heart sounds  No murmur heard  Pulmonary:      Effort: Pulmonary effort is normal  No respiratory distress  Breath sounds: Normal breath sounds  Musculoskeletal:         General: Normal range of motion  Left shoulder: Normal  No swelling, deformity, effusion, laceration, tenderness, bony tenderness or crepitus  Normal range of motion  Normal strength  Normal pulse  Cervical back: Tenderness (no palpated ) and bony tenderness present  No spasms  Pain with movement present  Normal range of motion  Thoracic back: Normal  Normal range of motion  Skin:     General: Skin is warm and dry  Capillary Refill: Capillary refill takes less than 2 seconds  Neurological:      General: No focal deficit present  Mental Status: She is alert and oriented to person, place, and time     Psychiatric:         Mood and Affect: Mood normal          Behavior: Behavior normal

## 2024-02-21 PROBLEM — Z00.00 HEALTHCARE MAINTENANCE: Status: RESOLVED | Noted: 2019-10-03 | Resolved: 2024-02-21

## 2024-02-21 PROBLEM — Z12.4 SCREENING FOR CERVICAL CANCER: Status: RESOLVED | Noted: 2019-10-03 | Resolved: 2024-02-21

## 2024-03-18 RX ORDER — DICLOFENAC SODIUM 75 MG/1
TABLET, DELAYED RELEASE ORAL
COMMUNITY
End: 2024-03-19 | Stop reason: ALTCHOICE

## 2024-03-18 NOTE — PROGRESS NOTES
Stacey Villatoro 1975 female MRN: [id number]      ASSESSMENT/PLAN  Problem List Items Addressed This Visit    None  Visit Diagnoses     Trapezius muscle spasm    -  Primary    Relevant Medications    cyclobenzaprine (FLEXERIL) 10 mg tablet    Other Relevant Orders    EMG 1 Limb    Cervical radiculopathy        Relevant Medications    methylPREDNISolone 4 MG tablet therapy pack    Other Relevant Orders    EMG 1 Limb    Healthcare maintenance        Screening for diabetes mellitus        Relevant Orders    Comprehensive metabolic panel    Screening, lipid        Relevant Orders    Lipid panel    Screening mammogram for breast cancer        Relevant Orders    Mammo screening bilateral w 3d & cad    Screen for colon cancer        Relevant Orders    Cologuard    Encounter for hepatitis C screening test for low risk patient        Relevant Orders    Hepatitis C antibody    Screening for HIV (human immunodeficiency virus)        Relevant Orders    HIV 1/2 AG/AB w Reflex [facility] for 2 yr old and above        History and exam suggestive of cervical radiculopathy, though unclear if impingement at C-spine itself vs due to significant spasm in trapezius. Less likely cubital/carpal tunnel, though also possible. Pt had C-spine XR in 2022 (through [facility], unable to see report, but was read as benign per note review). Will start Medrol (Reviewed possible ADRs including palpitations, insomnia, increased appetite mood fluctuations; not to combine with Motrin/Aleve/etc, Tylenol ok) and Flexeril (reviewed possible ADRs including somnolence, not to take prior to work/driving) for symptom relief. Will also seek EMG to evaluate for source of impingement -- pending results, may benefit from course of PT vs referral to Spine [facility] Pain/Ortho.     Health Maintenance:   BP WNL   CMP + Lipids to screen for HLD, DM   HIV, Hep C screening ordered, pt agreeable   Pap UTD (Due 10/2024)   Mammogram DUE -- encouraged to schedule   CRC DUE --  "defers colonoscopy, agreeable to Cologuard   Vaccinations: TDap UTD, Flu UTD, COVID completed primary + boosters   Encouraged regular physical activity, varied diet, and regular dental/eye exams       No future appointments.         SUBJECTIVE  CC: Neck Pain (Left side, pain from neck to shoulder)      HPI:  Stacey Villatoro is a 49 y.o. female who presents to re-establish care. History reviewed and updated as below.     Has had left sided neck pain \"on and off for years\", most recently 3-4 weeks ago   Gets a shooting pain in her arm, burning pain, sometimes has pins and needles in LUE   Gets headaches with it   Does note tightness in upper trap   It's never fully gone, but has flare ups throughout the day   No prior neck injury  Doesn't usually take anything for it, Aspercreme provides minimal relief, heat/ice helps but only while it's on   Has previously been seen at Urgent care multiple times -- given Medrol, muscle relaxants         Review of Systems   Constitutional:  Negative for unexpected weight change.   HENT:  Negative for congestion, ear pain, rhinorrhea and sore throat.    Eyes:  Negative for visual disturbance.   Respiratory:  Negative for cough and shortness of breath.    Cardiovascular:  Negative for chest pain, palpitations and leg swelling.   Gastrointestinal:  Negative for abdominal pain, blood in stool, constipation and diarrhea.   Endocrine: Negative for polyuria.   Genitourinary:  Negative for dysuria, hematuria and menstrual problem.   Neurological:  Negative for dizziness and headaches.   Psychiatric/Behavioral:  Negative for sleep disturbance.        Historical Information   The patient history was reviewed and updated as follows:    Past Medical History:   Diagnosis Date   • Arthritis      Past Surgical History:   Procedure Laterality Date   •  SECTION      x2   • FOOT SURGERY       Family History   Problem Relation Age of Onset   • Dementia Mother    • Heart disease Father     " "  Social History   Social History     Substance and Sexual Activity   Alcohol Use Not Currently     Social History     Substance and Sexual Activity   Drug Use No     Social History     Tobacco Use   Smoking Status Never   Smokeless Tobacco Never       Medications:     Current Outpatient Medications:   •  cyclobenzaprine (FLEXERIL) 10 mg tablet, Take 1 tablet (10 mg total) by mouth 3 (three) times a day as needed for muscle spasms, Disp: 20 tablet, Rfl: 0  •  methylPREDNISolone 4 MG tablet therapy pack, Use as directed on package, Disp: 21 each, Rfl: 0  No Known Allergies    OBJECTIVE    Vitals:   Vitals:    03/19/24 1117   BP: 122/86   Pulse: (!) 117   Temp: 98.8 °F (37.1 °C)   SpO2: 97%   Weight: 66.2 kg (146 lb)   Height: 5' 3\" (1.6 m)           Physical Exam  Vitals and nursing note reviewed.   Constitutional:       General: She is not in acute distress.     Appearance: Normal appearance.   HENT:      Head: Normocephalic and atraumatic.      Right Ear: Tympanic membrane, ear canal and external ear normal.      Left Ear: Tympanic membrane, ear canal and external ear normal.      Nose: Nose normal.      Mouth/Throat:      Mouth: Mucous membranes are moist.      Pharynx: No oropharyngeal exudate or posterior oropharyngeal erythema.   Eyes:      Conjunctiva/sclera: Conjunctivae normal.   Cardiovascular:      Rate and Rhythm: Normal rate and regular rhythm.   Pulmonary:      Effort: Pulmonary effort is normal. No respiratory distress.      Breath sounds: Normal breath sounds.   Abdominal:      General: Bowel sounds are normal. There is no distension.      Palpations: Abdomen is soft.      Tenderness: There is no abdominal tenderness.   Musculoskeletal:        Arms:       Right lower leg: No edema.      Left lower leg: No edema.      Comments: C-spine non-tender to palpation   Cervical paraspinal spasm noted  B/L UE strength, sensation intact   (-) Tinel, Phalen    Lymphadenopathy:      Cervical: No cervical " adenopathy.   Skin:     General: Skin is warm and dry.   Neurological:      Mental Status: She is alert.      Comments: Grossly intact   Psychiatric:         Mood and Affect: Mood normal.                    Marjorie Jamil DO  [facility]   3/19/2024  12:36 PM

## 2024-03-19 ENCOUNTER — OFFICE VISIT (OUTPATIENT)
Dept: FAMILY MEDICINE CLINIC | Facility: CLINIC | Age: 49
End: 2024-03-19
Payer: COMMERCIAL

## 2024-03-19 VITALS
SYSTOLIC BLOOD PRESSURE: 122 MMHG | HEIGHT: 63 IN | BODY MASS INDEX: 25.87 KG/M2 | DIASTOLIC BLOOD PRESSURE: 86 MMHG | OXYGEN SATURATION: 97 % | TEMPERATURE: 98.8 F | HEART RATE: 117 BPM | WEIGHT: 146 LBS

## 2024-03-19 DIAGNOSIS — M62.838 TRAPEZIUS MUSCLE SPASM: Primary | ICD-10-CM

## 2024-03-19 DIAGNOSIS — Z11.4 SCREENING FOR HIV (HUMAN IMMUNODEFICIENCY VIRUS): ICD-10-CM

## 2024-03-19 DIAGNOSIS — Z13.220 SCREENING, LIPID: ICD-10-CM

## 2024-03-19 DIAGNOSIS — Z13.1 SCREENING FOR DIABETES MELLITUS: ICD-10-CM

## 2024-03-19 DIAGNOSIS — Z00.00 HEALTHCARE MAINTENANCE: ICD-10-CM

## 2024-03-19 DIAGNOSIS — Z12.11 SCREEN FOR COLON CANCER: ICD-10-CM

## 2024-03-19 DIAGNOSIS — Z12.31 SCREENING MAMMOGRAM FOR BREAST CANCER: ICD-10-CM

## 2024-03-19 DIAGNOSIS — Z11.59 ENCOUNTER FOR HEPATITIS C SCREENING TEST FOR LOW RISK PATIENT: ICD-10-CM

## 2024-03-19 DIAGNOSIS — M54.12 CERVICAL RADICULOPATHY: ICD-10-CM

## 2024-03-19 PROCEDURE — 99204 OFFICE O/P NEW MOD 45 MIN: CPT | Performed by: FAMILY MEDICINE

## 2024-03-19 PROCEDURE — 99386 PREV VISIT NEW AGE 40-64: CPT | Performed by: FAMILY MEDICINE

## 2024-03-19 RX ORDER — METHYLPREDNISOLONE 4 MG/1
TABLET ORAL
Qty: 21 EACH | Refills: 0 | Status: SHIPPED | OUTPATIENT
Start: 2024-03-19

## 2024-03-19 RX ORDER — CYCLOBENZAPRINE HCL 10 MG
10 TABLET ORAL 3 TIMES DAILY PRN
Qty: 20 TABLET | Refills: 0 | Status: SHIPPED | OUTPATIENT
Start: 2024-03-19

## 2024-03-26 ENCOUNTER — APPOINTMENT (OUTPATIENT)
Dept: LAB | Facility: CLINIC | Age: 49
End: 2024-03-26
Payer: COMMERCIAL

## 2024-03-26 DIAGNOSIS — Z13.220 SCREENING, LIPID: ICD-10-CM

## 2024-03-26 DIAGNOSIS — Z11.59 ENCOUNTER FOR HEPATITIS C SCREENING TEST FOR LOW RISK PATIENT: ICD-10-CM

## 2024-03-26 DIAGNOSIS — Z13.1 SCREENING FOR DIABETES MELLITUS: ICD-10-CM

## 2024-03-26 DIAGNOSIS — Z11.4 SCREENING FOR HIV (HUMAN IMMUNODEFICIENCY VIRUS): ICD-10-CM

## 2024-03-26 LAB
ALBUMIN SERPL BCP-MCNC: 4.1 G/DL (ref 3.5–5)
ALP SERPL-CCNC: 45 U/L (ref 34–104)
ALT SERPL W P-5'-P-CCNC: 11 U/L (ref 7–52)
ANION GAP SERPL CALCULATED.3IONS-SCNC: 5 MMOL/L (ref 4–13)
AST SERPL W P-5'-P-CCNC: 12 U/L (ref 13–39)
BILIRUB SERPL-MCNC: 0.42 MG/DL (ref 0.2–1)
BUN SERPL-MCNC: 15 MG/DL (ref 5–25)
CALCIUM SERPL-MCNC: 8.8 MG/DL (ref 8.4–10.2)
CHLORIDE SERPL-SCNC: 104 MMOL/L (ref 96–108)
CHOLEST SERPL-MCNC: 140 MG/DL
CO2 SERPL-SCNC: 29 MMOL/L (ref 21–32)
CREAT SERPL-MCNC: 0.75 MG/DL (ref 0.6–1.3)
GFR SERPL CREATININE-BSD FRML MDRD: 95 ML/MIN/1.73SQ M
GLUCOSE P FAST SERPL-MCNC: 74 MG/DL (ref 65–99)
HCV AB SER QL: NORMAL
HDLC SERPL-MCNC: 43 MG/DL
HIV 1+2 AB+HIV1 P24 AG SERPL QL IA: NORMAL
HIV 2 AB SERPL QL IA: NORMAL
HIV1 AB SERPL QL IA: NORMAL
HIV1 P24 AG SERPL QL IA: NORMAL
LDLC SERPL CALC-MCNC: 78 MG/DL (ref 0–100)
NONHDLC SERPL-MCNC: 97 MG/DL
POTASSIUM SERPL-SCNC: 4.5 MMOL/L (ref 3.5–5.3)
PROT SERPL-MCNC: 6.9 G/DL (ref 6.4–8.4)
SODIUM SERPL-SCNC: 138 MMOL/L (ref 135–147)
TRIGL SERPL-MCNC: 95 MG/DL

## 2024-03-26 PROCEDURE — 86803 HEPATITIS C AB TEST: CPT

## 2024-03-26 PROCEDURE — 80061 LIPID PANEL: CPT

## 2024-03-26 PROCEDURE — 36415 COLL VENOUS BLD VENIPUNCTURE: CPT

## 2024-03-26 PROCEDURE — 80053 COMPREHEN METABOLIC PANEL: CPT

## 2024-03-26 PROCEDURE — 87389 HIV-1 AG W/HIV-1&-2 AB AG IA: CPT

## 2024-03-29 LAB — COLOGUARD RESULT REPORTABLE: NEGATIVE

## 2024-06-03 DIAGNOSIS — Z00.6 ENCOUNTER FOR EXAMINATION FOR NORMAL COMPARISON OR CONTROL IN CLINICAL RESEARCH PROGRAM: ICD-10-CM

## 2024-06-04 ENCOUNTER — APPOINTMENT (OUTPATIENT)
Dept: LAB | Facility: CLINIC | Age: 49
End: 2024-06-04

## 2024-06-04 DIAGNOSIS — Z00.6 ENCOUNTER FOR EXAMINATION FOR NORMAL COMPARISON OR CONTROL IN CLINICAL RESEARCH PROGRAM: ICD-10-CM

## 2024-06-04 PROCEDURE — 36415 COLL VENOUS BLD VENIPUNCTURE: CPT

## 2024-06-16 LAB
APOB+LDLR+PCSK9 GENE MUT ANL BLD/T: NOT DETECTED
BRCA1+BRCA2 DEL+DUP + FULL MUT ANL BLD/T: NOT DETECTED
MLH1+MSH2+MSH6+PMS2 GN DEL+DUP+FUL M: NOT DETECTED

## 2024-06-18 ENCOUNTER — HOSPITAL ENCOUNTER (OUTPATIENT)
Dept: MAMMOGRAPHY | Facility: CLINIC | Age: 49
Discharge: HOME | End: 2024-06-18
Payer: COMMERCIAL

## 2024-06-18 VITALS — HEIGHT: 63 IN | WEIGHT: 146 LBS | BODY MASS INDEX: 25.87 KG/M2

## 2024-06-18 DIAGNOSIS — Z12.31 SCREENING MAMMOGRAM FOR BREAST CANCER: ICD-10-CM

## 2024-06-18 PROCEDURE — 77063 BREAST TOMOSYNTHESIS BI: CPT

## 2024-06-18 PROCEDURE — 77067 SCR MAMMO BI INCL CAD: CPT

## 2024-06-24 NOTE — PROGRESS NOTES
Call received from patient regarding recommendation for mammogram, ultrasound and;    __X___ RIGHT ______LEFT      __X___Ultrasound guided  ______Stereotactic breast and lymph node biopsy.    Procedure explained to patient, additional questions answered at this time    __X___Verbalized understanding.      Blood thinners: No: __X___ Yes: _____ What:     Biopsy teaching sheet given:  _____yes __X__no (All teaching points discussed during call, pt with no questions at this time, pt adv to arrive at 0800 for mammogram to be followed by ultrasound and biopsy, if needed)    Pt given name/# for any further questions/needs

## 2024-07-11 ENCOUNTER — HOSPITAL ENCOUNTER (OUTPATIENT)
Dept: MAMMOGRAPHY | Facility: CLINIC | Age: 49
Discharge: HOME | End: 2024-07-11
Payer: COMMERCIAL

## 2024-07-11 ENCOUNTER — HOSPITAL ENCOUNTER (OUTPATIENT)
Dept: ULTRASOUND IMAGING | Facility: CLINIC | Age: 49
End: 2024-07-11
Payer: COMMERCIAL

## 2024-07-11 ENCOUNTER — HOSPITAL ENCOUNTER (OUTPATIENT)
Dept: MAMMOGRAPHY | Facility: CLINIC | Age: 49
End: 2024-07-11
Payer: COMMERCIAL

## 2024-07-11 VITALS — HEART RATE: 75 BPM | SYSTOLIC BLOOD PRESSURE: 147 MMHG | DIASTOLIC BLOOD PRESSURE: 89 MMHG

## 2024-07-11 VITALS — BODY MASS INDEX: 25.87 KG/M2 | WEIGHT: 146 LBS | HEIGHT: 63 IN

## 2024-07-11 DIAGNOSIS — R92.8 ABNORMAL SCREENING MAMMOGRAM: ICD-10-CM

## 2024-07-11 DIAGNOSIS — R92.8 ABNORMAL ULTRASOUND OF BREAST: ICD-10-CM

## 2024-07-11 DIAGNOSIS — R92.8 ABNORMAL MAMMOGRAM: ICD-10-CM

## 2024-07-11 PROCEDURE — 88305 TISSUE EXAM BY PATHOLOGIST: CPT | Performed by: STUDENT IN AN ORGANIZED HEALTH CARE EDUCATION/TRAINING PROGRAM

## 2024-07-11 PROCEDURE — 88341 IMHCHEM/IMCYTCHM EA ADD ANTB: CPT | Performed by: STUDENT IN AN ORGANIZED HEALTH CARE EDUCATION/TRAINING PROGRAM

## 2024-07-11 PROCEDURE — 77065 DX MAMMO INCL CAD UNI: CPT

## 2024-07-11 PROCEDURE — 76642 ULTRASOUND BREAST LIMITED: CPT

## 2024-07-11 PROCEDURE — 38505 NEEDLE BIOPSY LYMPH NODES: CPT

## 2024-07-11 PROCEDURE — 88360 TUMOR IMMUNOHISTOCHEM/MANUAL: CPT | Performed by: STUDENT IN AN ORGANIZED HEALTH CARE EDUCATION/TRAINING PROGRAM

## 2024-07-11 PROCEDURE — A4648 IMPLANTABLE TISSUE MARKER: HCPCS

## 2024-07-11 PROCEDURE — G0279 TOMOSYNTHESIS, MAMMO: HCPCS

## 2024-07-11 PROCEDURE — 76942 ECHO GUIDE FOR BIOPSY: CPT

## 2024-07-11 PROCEDURE — 88342 IMHCHEM/IMCYTCHM 1ST ANTB: CPT | Performed by: STUDENT IN AN ORGANIZED HEALTH CARE EDUCATION/TRAINING PROGRAM

## 2024-07-11 PROCEDURE — 19083 BX BREAST 1ST LESION US IMAG: CPT

## 2024-07-11 RX ORDER — LIDOCAINE HYDROCHLORIDE 10 MG/ML
5 INJECTION, SOLUTION EPIDURAL; INFILTRATION; INTRACAUDAL; PERINEURAL ONCE
Status: COMPLETED | OUTPATIENT
Start: 2024-07-11 | End: 2024-07-11

## 2024-07-11 RX ORDER — LIDOCAINE HYDROCHLORIDE 10 MG/ML
5 INJECTION, SOLUTION EPIDURAL; INFILTRATION; INTRACAUDAL; PERINEURAL ONCE
Status: DISCONTINUED | OUTPATIENT
Start: 2024-07-11 | End: 2024-07-15 | Stop reason: HOSPADM

## 2024-07-11 RX ADMIN — LIDOCAINE HYDROCHLORIDE 5 ML: 10 INJECTION, SOLUTION EPIDURAL; INFILTRATION; INTRACAUDAL; PERINEURAL at 09:29

## 2024-07-11 RX ADMIN — LIDOCAINE HYDROCHLORIDE 5 ML: 10 INJECTION, SOLUTION EPIDURAL; INFILTRATION; INTRACAUDAL; PERINEURAL at 09:24

## 2024-07-11 NOTE — PROGRESS NOTES
Procedure type:    __x___ultrasound guided _____stereotactic    Breast:    _____Left __x___Right    Location: 7 o'clock 7 cmfn    Needle: 12 gauge tori    # of passes: 3    Clip: zena     Performed by: Dr. Ubaldo Espinoza    Pressure held for 5 minutes by: Rachel Mccarty    Stermelody Strips:    ___X__yes _____no    Band aid:    __X___yes_____no    Tolerated procedure:    __X___yes _____no

## 2024-07-11 NOTE — PROGRESS NOTES
Procedure type:    __x___ultrasound guided _____stereotactic    Breast:    _____Left _x____Right    Location: right breast axilla    Needle: 12 gauge tori    # of passes: 3 (2 with calcs, 1 w/o)    Clip: zena     Performed by: Dr. Ubaldo Espinoza    Pressure held for 5 minutes by: Rachel Mccarty    Stermelody Strips:    ___X__yes _____no    Band aid:    __X___yes_____no    Tolerated procedure:    __X___yes _____no

## 2024-07-12 NOTE — PROGRESS NOTES
Post procedure call completed    Bleeding: _____yes __X___no (pt denies)    Pain: _____yes ___X___no (pt denies, used ice, took OTC pain meds x2 with relief)    Redness/Swelling: ______yes ___X___no (pt denies)    Band aid removed: _____yes ___X__no (discussed removing when she showers)    Steri-Strips intact: ___X___yes _____no (discussed with patient to remove steri strips on Tuesday if they have not come off on their own)    Pt with no questions at this time, adv will call when results available, adv to call with any questions or concerns, has name/# for contact

## 2024-07-15 ENCOUNTER — TELEPHONE (OUTPATIENT)
Dept: MAMMOGRAPHY | Facility: CLINIC | Age: 49
End: 2024-07-15

## 2024-07-15 ENCOUNTER — DOCUMENTATION (OUTPATIENT)
Dept: HEMATOLOGY ONCOLOGY | Facility: CLINIC | Age: 49
End: 2024-07-15

## 2024-07-15 DIAGNOSIS — C50.911 INFILTRATING DUCTAL CARCINOMA OF RIGHT FEMALE BREAST (HCC): Primary | ICD-10-CM

## 2024-07-15 DIAGNOSIS — N60.92 ATYPICAL LOBULAR HYPERPLASIA (ALH) OF LEFT BREAST: Primary | ICD-10-CM

## 2024-07-15 PROCEDURE — 88341 IMHCHEM/IMCYTCHM EA ADD ANTB: CPT | Performed by: STUDENT IN AN ORGANIZED HEALTH CARE EDUCATION/TRAINING PROGRAM

## 2024-07-15 PROCEDURE — 88342 IMHCHEM/IMCYTCHM 1ST ANTB: CPT | Performed by: STUDENT IN AN ORGANIZED HEALTH CARE EDUCATION/TRAINING PROGRAM

## 2024-07-15 PROCEDURE — 88360 TUMOR IMMUNOHISTOCHEM/MANUAL: CPT | Performed by: STUDENT IN AN ORGANIZED HEALTH CARE EDUCATION/TRAINING PROGRAM

## 2024-07-15 PROCEDURE — 88305 TISSUE EXAM BY PATHOLOGIST: CPT | Performed by: STUDENT IN AN ORGANIZED HEALTH CARE EDUCATION/TRAINING PROGRAM

## 2024-07-15 NOTE — TELEPHONE ENCOUNTER
Call placed to patient after pt given biopsy results from radiologist, Dr. Ubaldo Espinoza, questions answered, support given, adv next step is to set patient up with surgeon, options discussed and pt would like to have appt made with Dr. Jasen Brink, adv patient that  would call her back by tomorrow with appt, pt states understanding, pt with no questions at this time, has name/# for any further needs    Pt also adv that she would be getting call from Nurse Navigator Nini for additional support and continued care, discussed genetic testing, pt states she just had this done within the last 2 months and it was negative

## 2024-07-16 ENCOUNTER — DOCUMENTATION (OUTPATIENT)
Dept: HEMATOLOGY ONCOLOGY | Facility: CLINIC | Age: 49
End: 2024-07-16

## 2024-07-16 NOTE — PROGRESS NOTES
Referral received/ Chart reviewed for work up completed     Imaging completed:  6/18/24-Mammo screening bilateral, 7/11/24-mammogram diagnostic bilateral, US guided breast and lymph node biopsies completed at [facility]    Pathology completed:  7/11/24-tissue exam-US guided right breast and right axillary LN biopsies completed at [facility]    All records needed are in patients chart. No records retrieval needed at this time.

## 2024-07-17 ENCOUNTER — PATIENT OUTREACH (OUTPATIENT)
Dept: HEMATOLOGY ONCOLOGY | Facility: CLINIC | Age: 49
End: 2024-07-17

## 2024-07-17 PROBLEM — C50.511 MALIGNANT NEOPLASM OF LOWER-OUTER QUADRANT OF RIGHT BREAST OF FEMALE, ESTROGEN RECEPTOR POSITIVE (HCC): Status: ACTIVE | Noted: 2024-07-17

## 2024-07-17 PROBLEM — Z17.0 MALIGNANT NEOPLASM OF LOWER-OUTER QUADRANT OF RIGHT BREAST OF FEMALE, ESTROGEN RECEPTOR POSITIVE (HCC): Status: ACTIVE | Noted: 2024-07-17

## 2024-07-17 NOTE — PROGRESS NOTES
Breast Oncology Nurse Navigator    Called patient for initial outreach from nurse navigator.  Left voicemail message with contact information, including office hours.  Requested a call back.

## 2024-07-18 ENCOUNTER — OFFICE VISIT (OUTPATIENT)
Dept: SURGICAL ONCOLOGY | Facility: CLINIC | Age: 49
End: 2024-07-18
Payer: COMMERCIAL

## 2024-07-18 ENCOUNTER — HOSPITAL ENCOUNTER (OUTPATIENT)
Dept: CT IMAGING | Facility: HOSPITAL | Age: 49
End: 2024-07-18
Attending: SURGERY
Payer: COMMERCIAL

## 2024-07-18 ENCOUNTER — APPOINTMENT (OUTPATIENT)
Dept: LAB | Facility: HOSPITAL | Age: 49
End: 2024-07-18
Payer: COMMERCIAL

## 2024-07-18 VITALS
HEART RATE: 86 BPM | WEIGHT: 148 LBS | BODY MASS INDEX: 26.22 KG/M2 | RESPIRATION RATE: 16 BRPM | SYSTOLIC BLOOD PRESSURE: 128 MMHG | DIASTOLIC BLOOD PRESSURE: 80 MMHG | HEIGHT: 63 IN | TEMPERATURE: 98.2 F | OXYGEN SATURATION: 99 %

## 2024-07-18 DIAGNOSIS — C50.511 MALIGNANT NEOPLASM OF LOWER-OUTER QUADRANT OF RIGHT FEMALE BREAST, UNSPECIFIED ESTROGEN RECEPTOR STATUS (HCC): ICD-10-CM

## 2024-07-18 DIAGNOSIS — C50.511 MALIGNANT NEOPLASM OF LOWER-OUTER QUADRANT OF RIGHT BREAST OF FEMALE, ESTROGEN RECEPTOR POSITIVE (HCC): Primary | ICD-10-CM

## 2024-07-18 DIAGNOSIS — C50.511 MALIGNANT NEOPLASM OF LOWER-OUTER QUADRANT OF RIGHT BREAST OF FEMALE, ESTROGEN RECEPTOR POSITIVE (HCC): ICD-10-CM

## 2024-07-18 DIAGNOSIS — Z17.0 MALIGNANT NEOPLASM OF LOWER-OUTER QUADRANT OF RIGHT BREAST OF FEMALE, ESTROGEN RECEPTOR POSITIVE (HCC): Primary | ICD-10-CM

## 2024-07-18 DIAGNOSIS — C50.911 INFILTRATING DUCTAL CARCINOMA OF RIGHT FEMALE BREAST (HCC): ICD-10-CM

## 2024-07-18 DIAGNOSIS — Z17.0 MALIGNANT NEOPLASM OF LOWER-OUTER QUADRANT OF RIGHT BREAST OF FEMALE, ESTROGEN RECEPTOR POSITIVE (HCC): ICD-10-CM

## 2024-07-18 LAB
ALBUMIN SERPL BCG-MCNC: 4.5 G/DL (ref 3.5–5)
ALP SERPL-CCNC: 55 U/L (ref 34–104)
ALT SERPL W P-5'-P-CCNC: 9 U/L (ref 7–52)
ANION GAP SERPL CALCULATED.3IONS-SCNC: 7 MMOL/L (ref 4–13)
AST SERPL W P-5'-P-CCNC: 14 U/L (ref 13–39)
BASOPHILS # BLD AUTO: 0.06 THOUSANDS/ÂΜL (ref 0–0.1)
BASOPHILS NFR BLD AUTO: 1 % (ref 0–1)
BILIRUB SERPL-MCNC: 0.55 MG/DL (ref 0.2–1)
BUN SERPL-MCNC: 11 MG/DL (ref 5–25)
CALCIUM SERPL-MCNC: 9.5 MG/DL (ref 8.4–10.2)
CHLORIDE SERPL-SCNC: 105 MMOL/L (ref 96–108)
CO2 SERPL-SCNC: 25 MMOL/L (ref 21–32)
CREAT SERPL-MCNC: 0.73 MG/DL (ref 0.6–1.3)
EOSINOPHIL # BLD AUTO: 0.06 THOUSAND/ÂΜL (ref 0–0.61)
EOSINOPHIL NFR BLD AUTO: 1 % (ref 0–6)
ERYTHROCYTE [DISTWIDTH] IN BLOOD BY AUTOMATED COUNT: 11.8 % (ref 11.6–15.1)
GFR SERPL CREATININE-BSD FRML MDRD: 98 ML/MIN/1.73SQ M
GLUCOSE SERPL-MCNC: 99 MG/DL (ref 65–140)
HCT VFR BLD AUTO: 40.5 % (ref 34.8–46.1)
HGB BLD-MCNC: 13.8 G/DL (ref 11.5–15.4)
IMM GRANULOCYTES # BLD AUTO: 0.01 THOUSAND/UL (ref 0–0.2)
IMM GRANULOCYTES NFR BLD AUTO: 0 % (ref 0–2)
LYMPHOCYTES # BLD AUTO: 1.02 THOUSANDS/ÂΜL (ref 0.6–4.47)
LYMPHOCYTES NFR BLD AUTO: 19 % (ref 14–44)
MCH RBC QN AUTO: 30.1 PG (ref 26.8–34.3)
MCHC RBC AUTO-ENTMCNC: 34.1 G/DL (ref 31.4–37.4)
MCV RBC AUTO: 88 FL (ref 82–98)
MONOCYTES # BLD AUTO: 0.46 THOUSAND/ÂΜL (ref 0.17–1.22)
MONOCYTES NFR BLD AUTO: 9 % (ref 4–12)
NEUTROPHILS # BLD AUTO: 3.78 THOUSANDS/ÂΜL (ref 1.85–7.62)
NEUTS SEG NFR BLD AUTO: 70 % (ref 43–75)
NRBC BLD AUTO-RTO: 0 /100 WBCS
PLATELET # BLD AUTO: 229 THOUSANDS/UL (ref 149–390)
PMV BLD AUTO: 10 FL (ref 8.9–12.7)
POTASSIUM SERPL-SCNC: 4.5 MMOL/L (ref 3.5–5.3)
PROT SERPL-MCNC: 7.5 G/DL (ref 6.4–8.4)
RBC # BLD AUTO: 4.58 MILLION/UL (ref 3.81–5.12)
SODIUM SERPL-SCNC: 137 MMOL/L (ref 135–147)
WBC # BLD AUTO: 5.39 THOUSAND/UL (ref 4.31–10.16)

## 2024-07-18 PROCEDURE — 36415 COLL VENOUS BLD VENIPUNCTURE: CPT

## 2024-07-18 PROCEDURE — 85025 COMPLETE CBC W/AUTO DIFF WBC: CPT

## 2024-07-18 PROCEDURE — 99205 OFFICE O/P NEW HI 60 MIN: CPT | Performed by: SURGERY

## 2024-07-18 PROCEDURE — 71260 CT THORAX DX C+: CPT

## 2024-07-18 PROCEDURE — 80053 COMPREHEN METABOLIC PANEL: CPT

## 2024-07-18 PROCEDURE — 74177 CT ABD & PELVIS W/CONTRAST: CPT

## 2024-07-18 RX ADMIN — IOHEXOL 100 ML: 350 INJECTION, SOLUTION INTRAVENOUS at 14:21

## 2024-07-18 NOTE — PROGRESS NOTES
Surgical Oncology Consult Note       [facility]  [facility] [place]  [address] [facility] [address]    Stacey CAN Shauna  1975  [id number]      Chief Complaint   Patient presents with    Consult     NP REF FROM RBC        Assessment & Plan    1. Malignant neoplasm of lower-outer quadrant of right breast of female, estrogen receptor positive (HCC)  2. Infiltrating ductal carcinoma of right female breast (HCC)  -     Ambulatory referral to Surgical Oncology       Oncology History   Malignant neoplasm of lower-outer quadrant of right breast of female, estrogen receptor positive (HCC)   7/11/2024 Initial Diagnosis    Malignant neoplasm of lower-outer quadrant of right breast of female, estrogen receptor positive (HCC)     7/11/2024 Biopsy    Right breast ultrasound guided biopsy  A. 7 o'clock 7 cm from nipple  Invasive breast carcinoma of no special type (ductal NST/invasive ductal carcinoma)   Grade 2  ER 95  LA 61  HER2 1+  No lymphovascular invasion    B. Right axillary lymph node  Invasive ductal carcinoma of mammary origin, involving fibroadipose tissue, see comment.   Lymphoid tissue is not identified.    C. Right axillary lymph node  Invasive ductal carcinoma of mammary origin, involving fibroadipose tissue, see comment.   Lymphoid tissue is not identified.    Comment: Parts B and C are submitted as axillary lymph node, however no lymphoid tissue is identified. Carcinoma is present with expression of Jennifer-3 (performed on B/C) supporting the above diagnosis. However, clinical and radiographic correlation is advised as these findings may represent entire andria replacement by metastatic carcinoma or direct extension of the tumor.     Right malignancy appears unifocal. The biopsy-proven carcinoma measured 0.6 cm on ultrasound. There is metastatic disease to a right axillary lymph node.  A second abnormal appearing lymph node is noted in the  right axilla (not biopsied). Recent imaging of the contralateral left breast dated 2024 was reviewed and shows no suspicious findings.           This is a 49-year-old female went for her first mammogram with no family history of breast cancer and on smoker.  She had a mammogram and ultrasound ultrasound identified a 35 mm right breast mass BI-RADS 5 and for abnormal lymph nodes.  This was followed by biopsy of the right breast mass and lymph nodes consistent with ER/NV positive HER2/divina negative invasive ductal carcinoma.  She denies of any breast pain nipple discharge nipple retraction or skin changes.  She denies of any palpable mass in the past in her breast.  She is here with her  to discuss further workup and management        Review of Systems   Constitutional:  Negative for chills and fever.   HENT:  Negative for ear pain and sore throat.    Eyes:  Negative for pain and visual disturbance.   Respiratory:  Negative for cough and shortness of breath.    Cardiovascular:  Negative for chest pain and palpitations.   Gastrointestinal:  Negative for abdominal pain and vomiting.   Genitourinary:  Negative for dysuria and hematuria.   Musculoskeletal:  Negative for arthralgias and back pain.   Skin:  Negative for color change and rash.   Neurological:  Negative for seizures and syncope.   All other systems reviewed and are negative.       Past Medical History:      Patient Active Problem List   Diagnosis    Bilateral primary osteoarthritis of knee    BMI 30.0-30.9,adult    Malignant neoplasm of lower-outer quadrant of right breast of female, estrogen receptor positive (HCC)        Past Medical History:   Diagnosis Date    Arthritis     BRCA1 negative     BRCA2 negative     Breast mass         Past Surgical History:   Procedure Laterality Date    BREAST BIOPSY Right 2024     SECTION      x2    FOOT SURGERY      US GUIDED BREAST BIOPSY RIGHT COMPLETE Right 2024    US GUIDED BREAST LYMPH  NODE BIOPSY RIGHT Right 7/11/2024        Family History   Problem Relation Age of Onset    Dementia Mother     Lung cancer Mother     Heart disease Father     Skin cancer Father     Heart attack Father         AT THE AGE OF 60    No Known Problems Son     No Known Problems Daughter     No Known Problems Maternal Grandmother     No Known Problems Maternal Grandfather     No Known Problems Paternal Grandmother     No Known Problems Paternal Grandfather     Breast cancer Neg Hx         Social History     Socioeconomic History    Marital status: /Civil Union     Spouse name: Not on file    Number of children: Not on file    Years of education: Not on file    Highest education level: Not on file   Occupational History    Not on file   Tobacco Use    Smoking status: Never    Smokeless tobacco: Never   Vaping Use    Vaping status: Never Used   Substance and Sexual Activity    Alcohol use: Not Currently     Comment: Very rarely, 1 or 2 beers if i do.    Drug use: No    Sexual activity: Yes     Partners: Male   Other Topics Concern    Not on file   Social History Narrative    Lives with  and kids     Work for [employer]      Social Determinants of Health     Financial Resource Strain: Not on file   Food Insecurity: Not on file   Transportation Needs: Not on file   Physical Activity: Not on file   Stress: Not on file   Social Connections: Not on file   Intimate Partner Violence: Not on file   Housing Stability: Not on file        Current Outpatient Medications:     cyclobenzaprine (FLEXERIL) 10 mg tablet, Take 1 tablet (10 mg total) by mouth 3 (three) times a day as needed for muscle spasms, Disp: 20 tablet, Rfl: 0    methylPREDNISolone 4 MG tablet therapy pack, Use as directed on package, Disp: 21 each, Rfl: 0   No Known Allergies    Physical Exam:     Vitals:    07/18/24 1022   BP: 128/80   Pulse: 86   Resp: 16   Temp: 98.2 °F (36.8 °C)   SpO2: 99%     Physical Exam  Constitutional:       Appearance: Normal  appearance.   HENT:      Head: Normocephalic and atraumatic.      Nose: Nose normal.      Mouth/Throat:      Mouth: Mucous membranes are moist.   Eyes:      Pupils: Pupils are equal, round, and reactive to light.   Neck:      Comments: Diffusely enlarged thyroid gland more so prominent right side  Cardiovascular:      Rate and Rhythm: Normal rate.      Pulses: Normal pulses.      Heart sounds: Normal heart sounds.   Pulmonary:      Effort: Pulmonary effort is normal.      Breath sounds: Normal breath sounds.   Chest:          Comments: Right breast palpable mass at 7:00 tender to touch postbiopsy with bruise.  Palpable right axillary adenopathy.  Supraclavicular examination no palpable nodules.  Was examined seated as well as supine position.  Abdominal:      General: Bowel sounds are normal.      Palpations: Abdomen is soft.   Musculoskeletal:         General: Normal range of motion.      Cervical back: Normal range of motion and neck supple.   Skin:     General: Skin is warm.   Neurological:      General: No focal deficit present.      Mental Status: She is alert and oriented to person, place, and time.   Psychiatric:         Mood and Affect: Mood normal.         Behavior: Behavior normal.         Thought Content: Thought content normal.         Judgment: Judgment normal.       Results:   . Breast, Right, 7:00 O'clock,  7 cm from nipple, biopsy:  - Invasive breast carcinoma of no special type (ductal NST/invasive ductal carcinoma).   * Davin grade 2 of 3 (total score: 6 of 9)    -- tubule formation < 10%, score 3    -- nuclear grade 2 of 3, score 2    -- mitoses < 3/mm2, (</= 7 mitoses/10HPF), score 1.   * Confirmed by tumor cell immunophenotype:    -- positive: nuclear stain for GATA3 and membranous stain for E-cadherin and p120.    -- negative:  p63, calponin-B, CK5/6, and SMMHC .     * Invasive carcinoma involves 3 of 3 submitted core biopsies, max. dimension = 14 millimeters.   * Estrogen, progesterone &  HER2 receptor studies pending, to be described in a separate receptor report.    * Ductal carcinoma in situ (DCIS): Present; minor component (< 25% of total tumor).    -- solid architectural pattern, nuclear grade 2 of 3 , without necrosis.    * Lymph-vascular invasion: No definitive evidence.    * Microcalcifications: Absent.      B. Lymph node, Axilla, Right, Biopsy:  -   Invasive ductal carcinoma of mammary origin, involving fibroadipose tissue, see comment.   -   Lymphoid tissue is not identified.     C. Lymph node, Axilla, Right, Biopsy:  -   Invasive ductal carcinoma of mammary origin,  involving fibrous tissue, see comment.  -   Lymphoid tissue is not identified.    Estrogen Receptor (ER) Status  Positive (greater than 10% of cells demonstrate nuclear positivity)   Percentage of Cells with Nuclear Positivity  >95 %   Average Intensity of Staining  Strong   Test Type  Food and Drug Administration (FDA) cleared (test / vendor): CONFIRM anti-Estrogen Receptor (ER)/[employer] [place] [facility]   Primary Antibody  SP1   Test(s) Performed     Progesterone Receptor (PgR) Status  Positive   Percentage of Cells with Nuclear Positivity  61-70%   Average Intensity of Staining  Moderate   Test Type  Food and Drug Administration (FDA) cleared (test / vendor): CONFIRM anti-Progesterone Receptor ([place])/[employer] [facility]   Primary Antibody  1E2   Test(s) Performed     HER2 by Immunohistochemistry  Negative (Score 1+)   Test Type  Food and Drug Administration (FDA) cleared (test / vendor): PATHWAY anti-HER-2/divina (4B5)/[place] [facility]     Narrative & Impression   DIAGNOSIS: Abnormal screening mammogram      TECHNIQUE:   Digital diagnostic mammography was performed. Computer Aided Detection (CAD) analyzed all applicable images.  Right breast ultrasound was performed.      COMPARISONS: Prior breast imaging dated: 06/18/2024     RELEVANT HISTORY:   Family Breast Cancer History: History of breast cancer in Neg Hx.  Family Medical History: No known  relevant family medical history.   Personal History: No known relevant hormone history. Surgical history includes breast biopsy. Medical history includes BRCA 1 negative and BRCA 2 negative.     RISK ASSESSMENT:   5 Year Tyrer-Cuzick: 1.7%  10 Year Tyrer-Cuzick: 3.68%  Lifetime Tyrer-Cuzick: 17.71%     TISSUE DENSITY:   The breasts are extremely dense, which lowers the sensitivity of mammography.     INDICATION: Stacey Villatoro is a 49 y.o. female presenting for abnormal screening mammogram.     FINDINGS:   RIGHT  1) MASS [A]  Mammo diagnostic right w 3d & cad: There is an irregularly shaped mass seen in the lower central region of the right breast in the posterior depth. Associated features include skin retraction, nipple retraction and architectural distortion.   US breast right limited (diagnostic): There is a 35 mm x 16 mm x 36 mm irregularly shaped, hypoechoic mass with spiculated margins seen in the lower central region of the right breast at 7 o'clock, 7 cm from the nipple.   2) LYMPH NODE [C]  Mammo diagnostic right w 3d & cad: There is a lymph node seen in the right axilla with associated calcifications.  US breast right limited (diagnostic): 2 abnormal appearing lymph nodes are noted in the right axilla.        IMPRESSION:  Highly suspicious mass in the right breast and right axillary adenopathy.  Right ultrasound-guided core needle biopsies were subsequently performed and reported separately.           ASSESSMENT/BI-RADS CATEGORY:  Right: 5 - Highly Suggestive of Malignancy  Overall: 5 - Highly Suggestive of Malignancy     RECOMMENDATION:       - Ultrasound-guided breast biopsy for the right breast.     Narrative & Impression   DIAGNOSIS: Abnormal screening mammogram      TECHNIQUE:   Digital diagnostic mammography was performed. Computer Aided Detection (CAD) analyzed all applicable images.  Right breast ultrasound was performed.      COMPARISONS: Prior breast imaging dated: 06/18/2024     RELEVANT  HISTORY:   Family Breast Cancer History: History of breast cancer in Neg Hx.  Family Medical History: No known relevant family medical history.   Personal History: No known relevant hormone history. Surgical history includes breast biopsy. Medical history includes BRCA 1 negative and BRCA 2 negative.     RISK ASSESSMENT:   5 Year Tyrer-Cuzick: 1.7%  10 Year Tyrer-Cuzick: 3.68%  Lifetime Tyrer-Cuzick: 17.71%     TISSUE DENSITY:   The breasts are extremely dense, which lowers the sensitivity of mammography.     INDICATION: Stacey Villatoro is a 49 y.o. female presenting for abnormal screening mammogram.     FINDINGS:   RIGHT  1) MASS [A]  Mammo diagnostic right w 3d & cad: There is an irregularly shaped mass seen in the lower central region of the right breast in the posterior depth. Associated features include skin retraction, nipple retraction and architectural distortion.   US breast right limited (diagnostic): There is a 35 mm x 16 mm x 36 mm irregularly shaped, hypoechoic mass with spiculated margins seen in the lower central region of the right breast at 7 o'clock, 7 cm from the nipple.   2) LYMPH NODE [C]  Mammo diagnostic right w 3d & cad: There is a lymph node seen in the right axilla with associated calcifications.  US breast right limited (diagnostic): 2 abnormal appearing lymph nodes are noted in the right axilla.        IMPRESSION:  Highly suspicious mass in the right breast and right axillary adenopathy.  Right ultrasound-guided core needle biopsies were subsequently performed and reported separately.           ASSESSMENT/BI-RADS CATEGORY:  Right: 5 - Highly Suggestive of Malignancy  Overall: 5 - Highly Suggestive of Malignancy     RECOMMENDATION:       - Ultrasound-guided breast biopsy for the right breast.     Discussion/Summary:   Is a 49-year-old female with 35 x 36 millimeter right breast invasive ductal carcinoma ER/VA positive HER2 negative with palpable lymph nodes and lymph node metastasis.  We  did discuss in detail breast cancer initiation promotion progression with newly diagnosed breast cancer.  Given the size of the tumor and palpable adenopathy we will obtain staging workup CT chest abdomen pelvis, bone scan.  We will also obtain genetic testing.  We will order medical oncology consult .  We will also obtain an MRI of the breast to identify any other additional mass or masses as well as to truly determine the size of the tumor.  We did discuss in detail cancer disease status cancer treatment plans and cancer treatment goals with the patient and her  in detail.  I did spend more than 70 minutes reviewing medical records films pathology results patient examination and counseling with newly diagnosed cancer.  All patient's and her 's questions answered to their satisfaction we will see them after the studies and medical oncology consultation  Advance Care Planning/Advance Directives:  I Mert Brink MD discussed the disease status, and treatment plans with Stacey Villatoro today 07/18/24 and will follow-up with the patient.

## 2024-07-19 ENCOUNTER — CONSULT (OUTPATIENT)
Dept: HEMATOLOGY ONCOLOGY | Facility: CLINIC | Age: 49
End: 2024-07-19
Payer: COMMERCIAL

## 2024-07-19 ENCOUNTER — HOSPITAL ENCOUNTER (OUTPATIENT)
Dept: MRI IMAGING | Facility: HOSPITAL | Age: 49
End: 2024-07-19
Attending: SURGERY
Payer: COMMERCIAL

## 2024-07-19 ENCOUNTER — TELEPHONE (OUTPATIENT)
Dept: HEMATOLOGY ONCOLOGY | Facility: CLINIC | Age: 49
End: 2024-07-19

## 2024-07-19 VITALS
DIASTOLIC BLOOD PRESSURE: 70 MMHG | TEMPERATURE: 96.8 F | WEIGHT: 148 LBS | HEIGHT: 63 IN | BODY MASS INDEX: 26.22 KG/M2 | OXYGEN SATURATION: 99 % | SYSTOLIC BLOOD PRESSURE: 132 MMHG | HEART RATE: 90 BPM | RESPIRATION RATE: 16 BRPM

## 2024-07-19 DIAGNOSIS — Z17.0 MALIGNANT NEOPLASM OF LOWER-OUTER QUADRANT OF RIGHT BREAST OF FEMALE, ESTROGEN RECEPTOR POSITIVE (HCC): ICD-10-CM

## 2024-07-19 DIAGNOSIS — C50.511 MALIGNANT NEOPLASM OF LOWER-OUTER QUADRANT OF RIGHT BREAST OF FEMALE, ESTROGEN RECEPTOR POSITIVE (HCC): ICD-10-CM

## 2024-07-19 PROCEDURE — 77049 MRI BREAST C-+ W/CAD BI: CPT

## 2024-07-19 PROCEDURE — C8908 MRI W/O FOL W/CONT, BREAST,: HCPCS

## 2024-07-19 PROCEDURE — A9585 GADOBUTROL INJECTION: HCPCS | Performed by: SURGERY

## 2024-07-19 PROCEDURE — C8937 CAD BREAST MRI: HCPCS

## 2024-07-19 PROCEDURE — 99245 OFF/OP CONSLTJ NEW/EST HI 55: CPT | Performed by: INTERNAL MEDICINE

## 2024-07-19 RX ORDER — GADOBUTROL 604.72 MG/ML
7 INJECTION INTRAVENOUS
Status: COMPLETED | OUTPATIENT
Start: 2024-07-19 | End: 2024-07-19

## 2024-07-19 RX ADMIN — GADOBUTROL 7 ML: 604.72 INJECTION INTRAVENOUS at 10:20

## 2024-07-19 NOTE — PROGRESS NOTES
Hematology/Oncology Outpatient Follow-up  Stacey Villatoro 49 y.o. female 1975 [id number]    Date:  7/19/2024        Assessment and Plan:  1. Malignant neoplasm of lower-outer quadrant of right breast of female, estrogen receptor positive (HCC)    The Pt and her  were both educated about the recent Dx of locally advance right breast cancer. bx showed G2,invasive breast cancer, ER strongly +95%, HI +70%, HER2/divina negative 1+.  Imaging was negative for obvious metastatic spread.     We discussed the recommendation of the NCCN guideline for HR positive breast cancer. This was also discussed with Dr Brink from surg/onc.    She was told that pursuing the surgical resection first is usually advised for ER/HI positive, Her2 divina negative breast cancer.    We did discuss the benefit of the Onco-type DX to determine the need for adjuvant Chemotherapy. We also discussed the need for adjuvant endocrine therapy +/- BTK 4-6 inhibitor (pending final staging), Ovarian suppression and adjuvant XRT.       HPI:  This is a 49-year-old female without significant past medical history.  Family history is negative for breast cancer.  The patient apparently had her first screening mammogram on 6/18/2024 which was read as abnormal with right breast mass at 7 o'clock position 7 cm from the nipple with suspicious lymph adenopathy in the right axilla.  She then had multiple imaging including diagnostic mammogram, ultrasound of the right breast and ultrasound-guided biopsy of the right breast mass and right axillary lymph node on 7/11/2024.  The pathology was compatible with invasive breast cancer, ER strongly +95%, HI +70%, HER2/divina negative 1+.  CT scan of the chest abdomen pelvis on 7/18/2024 was negative for any obvious signs of metastatic disease.  She had an MRI of both breasts today.  The result is still pending.  [facility] molecular screening on 6/4/2024 was negative for obvious genetic alteration.  She was already seen by   Keena from  the breast surgical oncology team who sent her to us for further evaluation.  Oncology History   Malignant neoplasm of lower-outer quadrant of right breast of female, estrogen receptor positive (HCC)   7/11/2024 Initial Diagnosis    Malignant neoplasm of lower-outer quadrant of right breast of female, estrogen receptor positive (HCC)     7/11/2024 Biopsy    Right breast ultrasound guided biopsy  A. 7 o'clock 7 cm from nipple  Invasive breast carcinoma of no special type (ductal NST/invasive ductal carcinoma)   Grade 2  ER 95  UT 61  HER2 1+  No lymphovascular invasion    B. Right axillary lymph node  Invasive ductal carcinoma of mammary origin, involving fibroadipose tissue, see comment.   Lymphoid tissue is not identified.    C. Right axillary lymph node  Invasive ductal carcinoma of mammary origin, involving fibroadipose tissue, see comment.   Lymphoid tissue is not identified.    Comment: Parts B and C are submitted as axillary lymph node, however no lymphoid tissue is identified. Carcinoma is present with expression of Jennifer-3 (performed on B/C) supporting the above diagnosis. However, clinical and radiographic correlation is advised as these findings may represent entire andria replacement by metastatic carcinoma or direct extension of the tumor.     Right malignancy appears unifocal. The biopsy-proven carcinoma measured 0.6 cm on ultrasound. There is metastatic disease to a right axillary lymph node.  A second abnormal appearing lymph node is noted in the right axilla (not biopsied). Recent imaging of the contralateral left breast dated 6/18/2024 was reviewed and shows no suspicious findings.          Interval history:    ROS: Review of Systems   Constitutional:  Negative for chills and fever.   HENT:  Negative for ear pain and sore throat.    Eyes:  Negative for pain and visual disturbance.   Respiratory:  Negative for cough and shortness of breath.    Cardiovascular:  Negative for chest pain and  palpitations.   Gastrointestinal:  Negative for abdominal pain and vomiting.   Genitourinary:  Negative for dysuria and hematuria.   Musculoskeletal:  Negative for arthralgias and back pain.   Skin:  Negative for color change and rash.   Neurological:  Negative for seizures and syncope.   All other systems reviewed and are negative.      Past Medical History:   Diagnosis Date    Arthritis     BRCA1 negative     BRCA2 negative     Breast mass        Past Surgical History:   Procedure Laterality Date    BREAST BIOPSY Right 2024     SECTION      x2    FOOT SURGERY      US GUIDED BREAST BIOPSY RIGHT COMPLETE Right 2024    US GUIDED BREAST LYMPH NODE BIOPSY RIGHT Right 2024       Social History     Socioeconomic History    Marital status: /Civil Union     Spouse name: None    Number of children: None    Years of education: None    Highest education level: None   Occupational History    None   Tobacco Use    Smoking status: Never     Passive exposure: Never    Smokeless tobacco: Never   Vaping Use    Vaping status: Never Used   Substance and Sexual Activity    Alcohol use: Not Currently     Comment: Very rarely, 1 or 2 beers if i do.    Drug use: No    Sexual activity: Yes     Partners: Male   Other Topics Concern    None   Social History Narrative    Lives with  and kids     Work for [employer]      Social Determinants of Health     Financial Resource Strain: Not on file   Food Insecurity: Not on file   Transportation Needs: Not on file   Physical Activity: Not on file   Stress: Not on file   Social Connections: Not on file   Intimate Partner Violence: Not on file   Housing Stability: Not on file       Family History   Problem Relation Age of Onset    Dementia Mother     Lung cancer Mother     Heart disease Father     Skin cancer Father     Heart attack Father         AT THE AGE OF 60    No Known Problems Son     No Known Problems Daughter     No Known Problems Maternal Grandmother      "No Known Problems Maternal Grandfather     No Known Problems Paternal Grandmother     No Known Problems Paternal Grandfather     Breast cancer Neg Hx        No Known Allergies    No current outpatient medications on file.  No current facility-administered medications for this visit.      Physical Exam:  /70 (BP Location: Left arm, Patient Position: Sitting, Cuff Size: Adult)   Pulse 90   Temp (!) 96.8 °F (36 °C) (Temporal)   Resp 16   Ht 5' 3\" (1.6 m)   Wt 67.1 kg (148 lb)   LMP 07/08/2024 (Approximate)   SpO2 99%   BMI 26.22 kg/m²     Physical Exam  Constitutional:       General: She is not in acute distress.     Appearance: She is well-developed. She is not diaphoretic.   HENT:      Head: Normocephalic and atraumatic.      Nose: Nose normal.   Eyes:      General: No scleral icterus.        Right eye: No discharge.         Left eye: No discharge.      Conjunctiva/sclera: Conjunctivae normal.      Pupils: Pupils are equal, round, and reactive to light.   Neck:      Thyroid: No thyromegaly.      Vascular: No JVD.      Trachea: No tracheal deviation.   Cardiovascular:      Rate and Rhythm: Normal rate and regular rhythm.      Heart sounds: Normal heart sounds. No murmur heard.     No friction rub.   Pulmonary:      Effort: Pulmonary effort is normal. No respiratory distress.      Breath sounds: Normal breath sounds. No stridor. No wheezing or rales.   Chest:      Chest wall: No tenderness.   Abdominal:      General: There is no distension.      Palpations: Abdomen is soft. There is no hepatomegaly or splenomegaly.      Tenderness: There is no abdominal tenderness. There is no guarding or rebound.   Musculoskeletal:         General: No tenderness or deformity. Normal range of motion.      Cervical back: Normal range of motion and neck supple.   Lymphadenopathy:      Cervical: No cervical adenopathy.   Skin:     General: Skin is warm and dry.      Coloration: Skin is not pale.      Findings: No erythema or " "rash.   Neurological:      Mental Status: She is alert and oriented to person, place, and time.      Cranial Nerves: No cranial nerve deficit.      Coordination: Coordination normal.      Deep Tendon Reflexes: Reflexes are normal and symmetric.   Psychiatric:         Behavior: Behavior normal.         Thought Content: Thought content normal.         Judgment: Judgment normal.           Labs:  Lab Results   Component Value Date    WBC 5.39 07/18/2024    HGB 13.8 07/18/2024    HCT 40.5 07/18/2024    MCV 88 07/18/2024     07/18/2024     Lab Results   Component Value Date    K 4.5 07/18/2024     07/18/2024    CO2 25 07/18/2024    BUN 11 07/18/2024    CREATININE 0.73 07/18/2024    GLUF 74 03/26/2024    CALCIUM 9.5 07/18/2024    AST 14 07/18/2024    ALT 9 07/18/2024    ALKPHOS 55 07/18/2024    EGFR 98 07/18/2024     No results found for: \"TSH\"    Patient voiced understanding and agreement in the above discussion. Aware to contact our office with questions/symptoms in the interim.   "

## 2024-07-22 ENCOUNTER — TELEPHONE (OUTPATIENT)
Dept: SURGICAL ONCOLOGY | Facility: CLINIC | Age: 49
End: 2024-07-22

## 2024-07-22 DIAGNOSIS — Z17.0 MALIGNANT NEOPLASM OF LOWER-OUTER QUADRANT OF RIGHT BREAST OF FEMALE, ESTROGEN RECEPTOR POSITIVE (HCC): Primary | ICD-10-CM

## 2024-07-22 DIAGNOSIS — C50.511 MALIGNANT NEOPLASM OF LOWER-OUTER QUADRANT OF RIGHT BREAST OF FEMALE, ESTROGEN RECEPTOR POSITIVE (HCC): Primary | ICD-10-CM

## 2024-07-22 DIAGNOSIS — R92.8 ABNORMAL MRI, BREAST: ICD-10-CM

## 2024-07-22 NOTE — TELEPHONE ENCOUNTER
Called patient discussed why mri biopsies are recommended. Explained ct cap is clear but she still needs bone scan. Patient stated understanding. All questions answered at this time. Patient aware to expect a call to schedule mri biopsies. Patient f/u with dr. Brink may need to be changed due to when biopsies are scheduled.

## 2024-07-22 NOTE — TELEPHONE ENCOUNTER
Patient called in to let Dr Brink know that she saw Dr Hyman & he said that  she should start with surgery.    Patient wants to know if she still has to come in for 8/5 appointment or wait until after surgery for in office consult & if she still has to get bone scans done this Wednesday based on all the other testing & scheduling surgery.      Please call to discuss at [phone]

## 2024-07-22 NOTE — TELEPHONE ENCOUNTER
See the list below for the medications that are covered by patient's insurance:          Please advise. When changing to a preferred medication send a new rx to patient's pharmacy and update this encounter. Thank you.   Stacey is calling and she would like to speak to Gissell, she states that she has some additional questions.

## 2024-07-23 ENCOUNTER — PATIENT OUTREACH (OUTPATIENT)
Dept: HEMATOLOGY ONCOLOGY | Facility: CLINIC | Age: 49
End: 2024-07-23

## 2024-07-23 LAB — MISCELLANEOUS LAB TEST RESULT: NORMAL

## 2024-07-23 NOTE — PROGRESS NOTES
Breast Oncology Nurse Navigator    Referral received from [facility].  Called patient for initial outreach from nurse navigator.  Introduced myself and my role as well as members of the navigation team.  Oncology Nurse Navigator will follow patient until they are seen by medical oncology, at which point the patient navigator will follow the patient to survivorship.      Breast cancer diagnosis was made after routine mammogram.  This was her first mammogram.    Patient states understanding/awareness of diagnosis.    Confirmed upcoming appointment with Dr. Brink.  Patient asking about her upcoming MRI guided breast biopsies and when they will be scheduled.  Reached out to the  at the [facility], who provided the dates of 8/9/24 and 8/16/24.  Patient is frustrated that these dates will delay her upcoming appointment with Dr Brink.  Patient wants to begin treatment of her breast cancer.  Support and education provided.  Patient is scheduled for a bone scan tomorrow.  She is aware of the appointment details.  Is patient a current smoker: no    Patient lives with her  and two children, ages 18 and 16.  Patient has transportation to appointments.  Support system includes: spouse, father, brother and sister in law  Referral placed to oncology social worker: no    Patient states she has insurance but asked about financial assistance, if needed.  Advised that we can refer her to financial advocacy if and when needed.  Patient declines financial needs at present time.    Cancer family history includes: Mother had lung cancer (heavy smoker), father had skin cancer   Referral to oncology genetics:  no, already had blood drawn.  Is patient pre/post menopausal? Pre-menopausal    Discussed the Cancer Support Community and some of the programs and services offered, both in person and virtually.  Patient states she is not interested at this time.  Information sent via [employer].    Patient has my contact  information and knows she can reach out with questions.    General assessment completed.  Patient does have MyChart set up  [employer] message sent with our team's contact information.

## 2024-07-24 ENCOUNTER — PATIENT OUTREACH (OUTPATIENT)
Dept: HEMATOLOGY ONCOLOGY | Facility: CLINIC | Age: 49
End: 2024-07-24

## 2024-07-24 ENCOUNTER — HOSPITAL ENCOUNTER (OUTPATIENT)
Dept: NUCLEAR MEDICINE | Facility: HOSPITAL | Age: 49
Discharge: HOME | End: 2024-07-24
Payer: COMMERCIAL

## 2024-07-24 DIAGNOSIS — Z17.0 MALIGNANT NEOPLASM OF LOWER-OUTER QUADRANT OF RIGHT BREAST OF FEMALE, ESTROGEN RECEPTOR POSITIVE (HCC): ICD-10-CM

## 2024-07-24 DIAGNOSIS — C50.511 MALIGNANT NEOPLASM OF LOWER-OUTER QUADRANT OF RIGHT BREAST OF FEMALE, ESTROGEN RECEPTOR POSITIVE (HCC): ICD-10-CM

## 2024-07-24 PROCEDURE — 78306 BONE IMAGING WHOLE BODY: CPT

## 2024-07-24 PROCEDURE — A9503 TC99M MEDRONATE: HCPCS

## 2024-07-24 NOTE — PROGRESS NOTES
Patient Navigation attempted to outreach patient after her appointment with Medical Oncology to complete the Distress Thermometer.     I left a voice message introducing myself and my role.   I did explain the reason for my call and provided my contact information and requested a return call at the Pt's convenience.

## 2024-07-30 ENCOUNTER — PATIENT OUTREACH (OUTPATIENT)
Dept: HEMATOLOGY ONCOLOGY | Facility: CLINIC | Age: 49
End: 2024-07-30

## 2024-07-30 NOTE — PROGRESS NOTES
I reached out and spoke with Stacey now that consults have been completed with the oncology teams to complete the Distress Thermometer, review for any barriers to care and offer supportive services as needed. I reviewed and updated the members assigned to the care team in [facility].   She knows the members of the care team as well as how and when to contact them with any needs.   She verbalizes managing the schedules well.   She is currently able to drive and denies any transportation needs.    She denies any uncontrolled symptoms. Discussed role of Palliative Care in symptom and side effect management. Declined referral at this time.  Patients states that she is eating and drinking as per usual with no unintentional weight loss.     Patient does not smoke.   Patient states she is well supported by family and friends.    Patient feels she has adequate insurance coverage and denies any financial concerns at this time.     Based on individual needs I will follow up in about 4-6 weeks.   I have provided my direct contact information and welcome them to contact me if their needs as discussed above change. They were appreciative for the call.

## 2024-08-01 ENCOUNTER — HOSPITAL ENCOUNTER (OUTPATIENT)
Dept: ULTRASOUND IMAGING | Facility: HOSPITAL | Age: 49
End: 2024-08-01
Attending: SURGERY
Payer: COMMERCIAL

## 2024-08-01 ENCOUNTER — TELEPHONE (OUTPATIENT)
Age: 49
End: 2024-08-01

## 2024-08-01 DIAGNOSIS — C50.511 MALIGNANT NEOPLASM OF LOWER-OUTER QUADRANT OF RIGHT BREAST OF FEMALE, ESTROGEN RECEPTOR POSITIVE (HCC): ICD-10-CM

## 2024-08-01 DIAGNOSIS — Z17.0 MALIGNANT NEOPLASM OF LOWER-OUTER QUADRANT OF RIGHT BREAST OF FEMALE, ESTROGEN RECEPTOR POSITIVE (HCC): ICD-10-CM

## 2024-08-01 PROCEDURE — 76536 US EXAM OF HEAD AND NECK: CPT

## 2024-08-01 NOTE — TELEPHONE ENCOUNTER
Patient was also inquiring if appointment was needed to be changed on 08/05/2024 due to scheduled MRI.

## 2024-08-01 NOTE — TELEPHONE ENCOUNTER
Patient called to discuss US of thyroid scheduled for today. Discussed that thyroid US is still needed because recent CT scan only obtained partial images of the thyroid.    Patient agreeable.

## 2024-08-02 NOTE — TELEPHONE ENCOUNTER
Called patient and left a voicemail to reschedule her appointment with Dr. Brink for after her imaging is complete. [employer] number was provided and a [employer] message was sent.

## 2024-08-07 ENCOUNTER — OFFICE VISIT (OUTPATIENT)
Dept: URGENT CARE | Facility: CLINIC | Age: 49
End: 2024-08-07
Payer: COMMERCIAL

## 2024-08-07 ENCOUNTER — TELEPHONE (OUTPATIENT)
Age: 49
End: 2024-08-07

## 2024-08-07 VITALS
RESPIRATION RATE: 18 BRPM | DIASTOLIC BLOOD PRESSURE: 89 MMHG | TEMPERATURE: 98.6 F | SYSTOLIC BLOOD PRESSURE: 150 MMHG | HEART RATE: 95 BPM | OXYGEN SATURATION: 99 %

## 2024-08-07 DIAGNOSIS — L24.9 IRRITANT CONTACT DERMATITIS, UNSPECIFIED TRIGGER: Primary | ICD-10-CM

## 2024-08-07 PROCEDURE — G0382 LEV 3 HOSP TYPE B ED VISIT: HCPCS

## 2024-08-07 PROCEDURE — S9083 URGENT CARE CENTER GLOBAL: HCPCS

## 2024-08-07 PROCEDURE — 96372 THER/PROPH/DIAG INJ SC/IM: CPT

## 2024-08-07 RX ORDER — METHYLPREDNISOLONE 4 MG/1
TABLET ORAL
Qty: 21 EACH | Refills: 0 | Status: SHIPPED | OUTPATIENT
Start: 2024-08-07

## 2024-08-07 RX ORDER — METHYLPREDNISOLONE SODIUM SUCCINATE 40 MG/ML
40 INJECTION, POWDER, LYOPHILIZED, FOR SOLUTION INTRAMUSCULAR; INTRAVENOUS ONCE
Status: DISCONTINUED | OUTPATIENT
Start: 2024-08-07 | End: 2024-08-07

## 2024-08-07 RX ORDER — METHYLPREDNISOLONE SODIUM SUCCINATE 40 MG/ML
40 INJECTION, POWDER, LYOPHILIZED, FOR SOLUTION INTRAMUSCULAR; INTRAVENOUS ONCE
Status: COMPLETED | OUTPATIENT
Start: 2024-08-07 | End: 2024-08-07

## 2024-08-07 RX ADMIN — METHYLPREDNISOLONE SODIUM SUCCINATE 40 MG: 40 INJECTION, POWDER, LYOPHILIZED, FOR SOLUTION INTRAMUSCULAR; INTRAVENOUS at 12:49

## 2024-08-07 NOTE — TELEPHONE ENCOUNTER
"Patient called to discuss possible allergic reaction to bug bites after putting in window AC units last wee. Over the past few days, started to have rash on arms, leg, and neck, along with some facial/eyelid swelling. Rash is \"burning\" and itchy. Denies any chest pain or difficulty breathing at this time.     Patient would like to know if there was any contraindication for her being prescribed prednisone, as the patient was going to be going to urgent care today to be treated. Patient was seen by Med/Onc (Dr. Hyman) recently, and was asked if she was on any types of steroids, and she answered no, and does not want prednisone treatment cause delays in care.    Patient states she will be going to be seen in Urgent care today, but would like to know prior if prednisone is okay to take prior to going.  "

## 2024-08-07 NOTE — TELEPHONE ENCOUNTER
FYI:  Pt called back very anxious looking for a response from earlier call about her rash,according to chart per Surg onc nurse defer to Hem Onc /Dr Hyman.Warm transfer to [place] office,spoke with Emy SIMMS and she agrees pt to go to Urgent Care for evaluation Dr Hyman on vacation still per Emy .Emy took over the call with pt and will advise .

## 2024-08-07 NOTE — PATIENT INSTRUCTIONS
You were given a steroid shot in office. If rash remains severe start on medrol dose pack in 2 days -- Friday.  Can continue Claritin for symptoms.   Go to ER -- unable to swallow, throat closing, worsening facial swelling.

## 2024-08-07 NOTE — PROGRESS NOTES
[facility]    NAME: Stacey Villatoro is a 49 y.o. female  : 1975    MRN: [id number]  DATE: 2024  TIME: 12:45 PM    Assessment and Plan   Irritant contact dermatitis, unspecified trigger [L24.9]  1. Irritant contact dermatitis, unspecified trigger  methylPREDNISolone sodium succinate (Solu-MEDROL) injection 40 mg    methylPREDNISolone 4 MG tablet therapy pack        Given degree of rash given IM steroids in office, suspect will need longer course so also sent in PO steroids as well.   No clear trigger, she does have bug bites which may be causing but severe facial swelling and drainage does not eliminate poison ivy from differential.   Follow up with primary care in 3-5 days.  Go to ER if symptoms get worse.     Patient Instructions     You were given a steroid shot in office. If rash remains severe start on medrol dose pack in 2 days -- Friday.  Can continue Claritin for symptoms.   Go to ER -- unable to swallow, throat closing, worsening facial swelling.   Go see your regular family doctor in 3-5 days.  Go to emergency room (ER) if you are getting worse.     Chief Complaint     Chief Complaint   Patient presents with    Rash     Noticed insect bites over the last 2 weeks. Rash to B/L arms face and neck started this morning. Reports face feels like a sun burn sensation and rest of body is itchy. Also on chest. Took Claritin at 2am         History of Present Illness       Presents with insect bites over the past 2 weeks. Rash noted to bilateral arms, chest, face and neck that began this morning. Sunburn sensation with itchyness. Took claritin for symptoms. Noted weeping from the nose area today. Noted she did install AC unit with holes present where bugs did get into the house, uncertain is related.         Review of Systems   Review of Systems   Constitutional:  Negative for fever.   HENT:  Negative for trouble swallowing and voice change.    Respiratory:  Negative for shortness of  breath.    Cardiovascular:  Negative for chest pain.   Skin:  Positive for rash.         Current Medications       Current Outpatient Medications:     methylPREDNISolone 4 MG tablet therapy pack, Use as directed on package, Disp: 21 each, Rfl: 0    Current Facility-Administered Medications:     methylPREDNISolone sodium succinate (Solu-MEDROL) injection 40 mg, 40 mg, Intravenous, Once,     Current Allergies     Allergies as of 2024    (No Known Allergies)            The following portions of the patient's history were reviewed and updated as appropriate: allergies, current medications, past family history, past medical history, past social history, past surgical history and problem list.     Past Medical History:   Diagnosis Date    Arthritis     BRCA1 negative     BRCA2 negative     Breast cancer (HCC)     Breast mass        Past Surgical History:   Procedure Laterality Date    BREAST BIOPSY Right 2024     SECTION      x2    FOOT SURGERY      US GUIDED BREAST BIOPSY RIGHT COMPLETE Right 2024    US GUIDED BREAST LYMPH NODE BIOPSY RIGHT Right 2024       Family History   Problem Relation Age of Onset    Dementia Mother     Lung cancer Mother     Heart disease Father     Skin cancer Father     Heart attack Father         AT THE AGE OF 60    No Known Problems Son     No Known Problems Daughter     No Known Problems Maternal Grandmother     No Known Problems Maternal Grandfather     No Known Problems Paternal Grandmother     No Known Problems Paternal Grandfather     Breast cancer Neg Hx          Medications have been verified.        Objective   /89   Pulse 95   Temp 98.6 °F (37 °C)   Resp 18   LMP 2024 (Approximate)   SpO2 99%        Physical Exam     Physical Exam  Vitals reviewed.   Constitutional:       Appearance: Normal appearance.   Cardiovascular:      Rate and Rhythm: Normal rate and regular rhythm.      Pulses: Normal pulses.      Heart sounds: Normal heart  sounds. No murmur heard.  Pulmonary:      Effort: Pulmonary effort is normal. No respiratory distress.      Breath sounds: Normal breath sounds.   Skin:     General: Skin is warm and dry.      Capillary Refill: Capillary refill takes less than 2 seconds.      Findings: Rash present.      Comments: Legs with scattered bug bites with localized erythema, chest/neck/facial area with blotchy erythema. Nose area with blisters and weeping with clear fluid present. Erythema around left eye without edema to either eyelid area.    Neurological:      General: No focal deficit present.      Mental Status: She is alert and oriented to person, place, and time.   Psychiatric:         Mood and Affect: Mood normal.         Behavior: Behavior normal.

## 2024-08-07 NOTE — TELEPHONE ENCOUNTER
Spoke to pt and instructed her to go to Urgent Care as it sounded like she was having an allergic reaction. Pt agreed to go to Urgent care, will make sure pt has F/U with dr milligan her Onco type Dx was pending the last time I checked on 7/31/24.

## 2024-08-09 ENCOUNTER — HOSPITAL ENCOUNTER (OUTPATIENT)
Dept: RADIOLOGY | Facility: HOSPITAL | Age: 49
Discharge: HOME | End: 2024-08-09
Attending: SURGERY
Payer: COMMERCIAL

## 2024-08-09 DIAGNOSIS — Z17.0 MALIGNANT NEOPLASM OF LOWER-OUTER QUADRANT OF RIGHT BREAST OF FEMALE, ESTROGEN RECEPTOR POSITIVE (HCC): ICD-10-CM

## 2024-08-09 DIAGNOSIS — C50.511 MALIGNANT NEOPLASM OF LOWER-OUTER QUADRANT OF RIGHT BREAST OF FEMALE, ESTROGEN RECEPTOR POSITIVE (HCC): ICD-10-CM

## 2024-08-09 DIAGNOSIS — R92.8 ABNORMAL MRI, BREAST: ICD-10-CM

## 2024-08-09 PROCEDURE — 88341 IMHCHEM/IMCYTCHM EA ADD ANTB: CPT | Performed by: PATHOLOGY

## 2024-08-09 PROCEDURE — A4648 IMPLANTABLE TISSUE MARKER: HCPCS

## 2024-08-09 PROCEDURE — 88342 IMHCHEM/IMCYTCHM 1ST ANTB: CPT | Performed by: PATHOLOGY

## 2024-08-09 PROCEDURE — A9585 GADOBUTROL INJECTION: HCPCS | Performed by: SURGERY

## 2024-08-09 PROCEDURE — 88305 TISSUE EXAM BY PATHOLOGIST: CPT | Performed by: PATHOLOGY

## 2024-08-09 PROCEDURE — 19085 BX BREAST 1ST LESION MR IMAG: CPT

## 2024-08-09 RX ORDER — LIDOCAINE HYDROCHLORIDE AND EPINEPHRINE BITARTRATE 20; .01 MG/ML; MG/ML
20 INJECTION, SOLUTION SUBCUTANEOUS ONCE
Status: COMPLETED | OUTPATIENT
Start: 2024-08-09 | End: 2024-08-09

## 2024-08-09 RX ORDER — LIDOCAINE HYDROCHLORIDE 10 MG/ML
5 INJECTION, SOLUTION EPIDURAL; INFILTRATION; INTRACAUDAL; PERINEURAL ONCE
Status: COMPLETED | OUTPATIENT
Start: 2024-08-09 | End: 2024-08-09

## 2024-08-09 RX ORDER — GADOBUTROL 604.72 MG/ML
7 INJECTION INTRAVENOUS
Status: COMPLETED | OUTPATIENT
Start: 2024-08-09 | End: 2024-08-09

## 2024-08-09 RX ADMIN — LIDOCAINE HYDROCHLORIDE 5 ML: 10 INJECTION, SOLUTION EPIDURAL; INFILTRATION; INTRACAUDAL; PERINEURAL at 08:41

## 2024-08-09 RX ADMIN — LIDOCAINE HYDROCHLORIDE,EPINEPHRINE BITARTRATE 20 ML: 20; .01 INJECTION, SOLUTION INFILTRATION; PERINEURAL at 08:40

## 2024-08-09 RX ADMIN — GADOBUTROL 7 ML: 604.72 INJECTION INTRAVENOUS at 08:29

## 2024-08-12 PROCEDURE — 88342 IMHCHEM/IMCYTCHM 1ST ANTB: CPT | Performed by: PATHOLOGY

## 2024-08-12 PROCEDURE — 88341 IMHCHEM/IMCYTCHM EA ADD ANTB: CPT | Performed by: PATHOLOGY

## 2024-08-12 PROCEDURE — 88305 TISSUE EXAM BY PATHOLOGIST: CPT | Performed by: PATHOLOGY

## 2024-08-16 ENCOUNTER — HOSPITAL ENCOUNTER (OUTPATIENT)
Dept: RADIOLOGY | Facility: HOSPITAL | Age: 49
Discharge: HOME | End: 2024-08-16
Attending: SURGERY
Payer: COMMERCIAL

## 2024-08-16 DIAGNOSIS — C50.511 MALIGNANT NEOPLASM OF LOWER-OUTER QUADRANT OF RIGHT BREAST OF FEMALE, ESTROGEN RECEPTOR POSITIVE (HCC): ICD-10-CM

## 2024-08-16 DIAGNOSIS — R92.8 ABNORMAL MRI, BREAST: ICD-10-CM

## 2024-08-16 DIAGNOSIS — Z17.0 MALIGNANT NEOPLASM OF LOWER-OUTER QUADRANT OF RIGHT BREAST OF FEMALE, ESTROGEN RECEPTOR POSITIVE (HCC): ICD-10-CM

## 2024-08-16 PROCEDURE — 19085 BX BREAST 1ST LESION MR IMAG: CPT

## 2024-08-16 PROCEDURE — A4648 IMPLANTABLE TISSUE MARKER: HCPCS

## 2024-08-16 PROCEDURE — 88305 TISSUE EXAM BY PATHOLOGIST: CPT | Performed by: PATHOLOGY

## 2024-08-16 PROCEDURE — A9585 GADOBUTROL INJECTION: HCPCS | Performed by: SURGERY

## 2024-08-16 RX ORDER — LIDOCAINE HYDROCHLORIDE 10 MG/ML
5 INJECTION, SOLUTION EPIDURAL; INFILTRATION; INTRACAUDAL; PERINEURAL ONCE
Status: COMPLETED | OUTPATIENT
Start: 2024-08-16 | End: 2024-08-16

## 2024-08-16 RX ORDER — GADOBUTROL 604.72 MG/ML
8 INJECTION INTRAVENOUS
Status: COMPLETED | OUTPATIENT
Start: 2024-08-16 | End: 2024-08-16

## 2024-08-16 RX ORDER — LIDOCAINE HYDROCHLORIDE AND EPINEPHRINE BITARTRATE 20; .01 MG/ML; MG/ML
20 INJECTION, SOLUTION SUBCUTANEOUS ONCE
Status: COMPLETED | OUTPATIENT
Start: 2024-08-16 | End: 2024-08-16

## 2024-08-16 RX ADMIN — LIDOCAINE HYDROCHLORIDE,EPINEPHRINE BITARTRATE 20 ML: 20; .01 INJECTION, SOLUTION INFILTRATION; PERINEURAL at 10:24

## 2024-08-16 RX ADMIN — LIDOCAINE HYDROCHLORIDE 5 ML: 10 INJECTION, SOLUTION EPIDURAL; INFILTRATION; INTRACAUDAL; PERINEURAL at 10:24

## 2024-08-16 RX ADMIN — GADOBUTROL 8 ML: 604.72 INJECTION INTRAVENOUS at 10:07

## 2024-08-19 PROCEDURE — 88305 TISSUE EXAM BY PATHOLOGIST: CPT | Performed by: PATHOLOGY

## 2024-08-20 ENCOUNTER — TELEPHONE (OUTPATIENT)
Dept: HEMATOLOGY ONCOLOGY | Facility: CLINIC | Age: 49
End: 2024-08-20

## 2024-08-20 PROBLEM — C50.911 CARCINOMA OF RIGHT BREAST METASTATIC TO AXILLARY LYMPH NODE (HCC): Status: ACTIVE | Noted: 2024-08-20

## 2024-08-20 PROBLEM — C77.3 CARCINOMA OF RIGHT BREAST METASTATIC TO AXILLARY LYMPH NODE (HCC): Status: ACTIVE | Noted: 2024-08-20

## 2024-08-20 NOTE — TELEPHONE ENCOUNTER
Left message for patient stating that we were able to schedule her for 8/29/24 at 9:20AM to see Dr. Hyman in [place]. Informed patient this will be at our [place] location. Provided time, date and address/phone number.

## 2024-08-21 ENCOUNTER — TELEPHONE (OUTPATIENT)
Dept: PLASTIC SURGERY | Facility: CLINIC | Age: 49
End: 2024-08-21

## 2024-08-21 ENCOUNTER — OFFICE VISIT (OUTPATIENT)
Dept: SURGICAL ONCOLOGY | Facility: CLINIC | Age: 49
End: 2024-08-21
Payer: COMMERCIAL

## 2024-08-21 ENCOUNTER — DOCUMENTATION (OUTPATIENT)
Dept: HEMATOLOGY ONCOLOGY | Facility: CLINIC | Age: 49
End: 2024-08-21

## 2024-08-21 VITALS
SYSTOLIC BLOOD PRESSURE: 140 MMHG | WEIGHT: 148 LBS | HEART RATE: 64 BPM | BODY MASS INDEX: 26.22 KG/M2 | OXYGEN SATURATION: 99 % | DIASTOLIC BLOOD PRESSURE: 90 MMHG | HEIGHT: 63 IN

## 2024-08-21 DIAGNOSIS — C77.3 CARCINOMA OF RIGHT BREAST METASTATIC TO AXILLARY LYMPH NODE (HCC): ICD-10-CM

## 2024-08-21 DIAGNOSIS — C50.911 CARCINOMA OF RIGHT BREAST METASTATIC TO AXILLARY LYMPH NODE (HCC): ICD-10-CM

## 2024-08-21 DIAGNOSIS — Z17.0 MALIGNANT NEOPLASM OF LOWER-OUTER QUADRANT OF RIGHT BREAST OF FEMALE, ESTROGEN RECEPTOR POSITIVE (HCC): Primary | ICD-10-CM

## 2024-08-21 DIAGNOSIS — C50.511 MALIGNANT NEOPLASM OF LOWER-OUTER QUADRANT OF RIGHT BREAST OF FEMALE, ESTROGEN RECEPTOR POSITIVE (HCC): Primary | ICD-10-CM

## 2024-08-21 DIAGNOSIS — Z01.818 PRE-OP EXAMINATION: ICD-10-CM

## 2024-08-21 DIAGNOSIS — N60.92 ATYPICAL LOBULAR HYPERPLASIA (ALH) OF LEFT BREAST: ICD-10-CM

## 2024-08-21 PROCEDURE — 99245 OFF/OP CONSLTJ NEW/EST HI 55: CPT | Performed by: SURGERY

## 2024-08-21 RX ORDER — CEFAZOLIN SODIUM 1 G/50ML
1000 SOLUTION INTRAVENOUS ONCE
OUTPATIENT
Start: 2024-08-21 | End: 2024-08-21

## 2024-08-21 NOTE — TELEPHONE ENCOUNTER
JUDYM for patient to call me directly to schedule an immediate breast reconstruction consult with Dr. Archibald.

## 2024-08-21 NOTE — PROGRESS NOTES
Patient sent message for [employer] regarding incoming medical bills.  She is requesting to speak with someone regarding payment of bills related to her breast cancer diagnosis.    Will route this message to Oncology Financial Advocacy group for further assistance.

## 2024-08-21 NOTE — PROGRESS NOTES
Surgical Oncology Follow Up  [facility]  [facility] [place]  [address] [address]    Stacey Villatoro  1975  [id number]      Chief Complaint   Patient presents with   • Follow-up     Follow Up with Imagining - Right Breast        Assessment & Plan:   This is a 49-year-old female with grade 2 ER/TN positive large right breast tumor with right axillary metastasis.  Staging workups were reviewed and no distant disease.  She also underwent bilateral breast MRI and MRI guided biopsy both breast right breast additional biopsies are benign.  Left breast biopsy is consistent with atypical lobular hyperplasia.  Clinically palpable right axillary lymph nodes.  All options were reviewed and discussed.  Pathology findings, staging workups films were reviewed and discussed as well.  After extensive discussion she prefers to undergo right modified radical mastectomy and left total mastectomy with reconstruction.  All patient's questions were answered to her satisfaction.  We will coordinate plastic surgery team.  I also discussed with Dr. Hyman the treatment options.  I agree with surgery upfront for hormone positive tumor probably advisable within stage IIa  Given minimal response.    Cancer History:     Oncology History   Malignant neoplasm of lower-outer quadrant of right breast of female, estrogen receptor positive (HCC)   7/11/2024 Initial Diagnosis    Malignant neoplasm of lower-outer quadrant of right breast of female, estrogen receptor positive (HCC)     7/11/2024 Biopsy    Right breast ultrasound guided biopsy  A. 7 o'clock 7 cm from nipple  Invasive breast carcinoma of no special type (ductal NST/invasive ductal carcinoma)   Grade 2  ER 95  TN 61  HER2 1+  No lymphovascular invasion    B. Right axillary lymph node  Invasive ductal carcinoma of mammary origin, involving fibroadipose tissue, see comment.   Lymphoid tissue is not identified.    C. Right  axillary lymph node  Invasive ductal carcinoma of mammary origin, involving fibroadipose tissue, see comment.   Lymphoid tissue is not identified.    Comment: Parts B and C are submitted as axillary lymph node, however no lymphoid tissue is identified. Carcinoma is present with expression of Jennifer-3 (performed on B/C) supporting the above diagnosis. However, clinical and radiographic correlation is advised as these findings may represent entire andria replacement by metastatic carcinoma or direct extension of the tumor.     Right malignancy appears unifocal. The biopsy-proven carcinoma measured 0.6 cm on ultrasound. There is metastatic disease to a right axillary lymph node.  A second abnormal appearing lymph node is noted in the right axilla (not biopsied). Recent imaging of the contralateral left breast dated 6/18/2024 was reviewed and shows no suspicious findings.      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 No pathogenic sequence variants or deletions/duplications identified           Interval History:   Follow-up after staging workup and medical oncology consultation with right breast cancer metastasis to axillary lymph node.    Review of Systems:   Review of Systems   Constitutional:  Negative for chills and fever.   HENT:  Negative for ear pain and sore throat.    Eyes:  Negative for pain and visual disturbance.   Respiratory:  Negative for cough and shortness of breath.    Cardiovascular:  Negative for chest pain and palpitations.   Gastrointestinal:  Negative for abdominal pain and vomiting.   Genitourinary:  Negative for dysuria and hematuria.   Musculoskeletal:  Negative for arthralgias and back pain.   Skin:  Negative for color change and rash.   Neurological:  Negative for seizures and syncope.   All other systems reviewed and are negative.    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)     Past Medical History:   Diagnosis Date   • Arthritis    • BRCA1 negative    • BRCA2 negative    • Breast cancer (HCC)    • Breast mass      Past Surgical History:   Procedure Laterality Date   • BREAST BIOPSY Right 2024   •  SECTION      x2   • FOOT SURGERY     • MRI BREAST BIOPSY LEFT (ALL INCLUSIVE) Left 2024   •  MRI BREAST BIOPSY RIGHT (ALL INCLUSIVE) Right 8/16/2024   • US GUIDED BREAST BIOPSY RIGHT COMPLETE Right 7/11/2024   • US GUIDED BREAST LYMPH NODE BIOPSY RIGHT Right 7/11/2024     Family History   Problem Relation Age of Onset   • Dementia Mother    • Lung cancer Mother    • Heart disease Father    • Skin cancer Father    • Heart attack Father         AT THE AGE OF 60   • No Known Problems Son    • No Known Problems Daughter    • No Known Problems Maternal Grandmother    • No Known Problems Maternal Grandfather    • No Known Problems Paternal Grandmother    • No Known Problems Paternal Grandfather    • Breast cancer Neg Hx      Social History     Socioeconomic History   • Marital status: /Civil Union     Spouse name: Not on file   • Number of children: Not on file   • Years of education: Not on file   • Highest education level: Not on file   Occupational History   • Not on file   Tobacco Use   • Smoking status: Never     Passive exposure: Never   • Smokeless tobacco: Never   Vaping Use   • Vaping status: Never Used   Substance and Sexual Activity   • Alcohol use: Not Currently     Comment: Very rarely, 1 or 2 beers if i do.   • Drug use: No   • Sexual activity: Yes     Partners: Male   Other Topics Concern   • Not on file   Social History Narrative    Lives with  and kids     Work for [employer]      Social Determinants of Health     Financial Resource Strain: Not on file   Food Insecurity: Not on file   Transportation Needs: Not on file   Physical Activity: Not on file   Stress: Not on file   Social Connections: Not on file   Intimate Partner Violence: Not on file   Housing Stability: Not on file       Current Outpatient Medications:   •  methylPREDNISolone 4 MG tablet therapy pack, Use as directed on package (Patient not taking: Reported on 8/21/2024), Disp: 21 each, Rfl: 0  No Known Allergies    Physical Exam:     Vitals:    08/21/24 1129   BP: 140/90   Pulse: 64   SpO2: 99%     Physical  "Exam  Constitutional:       Appearance: Normal appearance.   HENT:      Head: Normocephalic and atraumatic.      Nose: Nose normal.      Mouth/Throat:      Mouth: Mucous membranes are moist.   Eyes:      Pupils: Pupils are equal, round, and reactive to light.   Cardiovascular:      Rate and Rhythm: Normal rate.      Pulses: Normal pulses.      Heart sounds: Normal heart sounds.   Pulmonary:      Effort: Pulmonary effort is normal.      Breath sounds: Normal breath sounds.   Chest:          Comments: Right breast palpable mass with bruise/hematoma.  Right axillary palpable adenopathy.  Right supraclavicular no palpable adenopathy.    Left breast mild bruise from recent biopsy and tenderness.  No discrete palpable mass or masses.  Left axillary and supraclavicular examination no palpable adenopathy.  Patient was examined seated as well as supine position.  Abdominal:      General: Bowel sounds are normal.      Palpations: Abdomen is soft.   Musculoskeletal:         General: Normal range of motion.      Cervical back: Normal range of motion and neck supple.      Right lower leg: No edema.      Left lower leg: No edema.   Skin:     General: Skin is warm.      Coloration: Skin is not jaundiced.   Neurological:      General: No focal deficit present.      Mental Status: She is alert and oriented to person, place, and time.   Psychiatric:         Mood and Affect: Mood normal.         Behavior: Behavior normal.         Thought Content: Thought content normal.         Judgment: Judgment normal.       Results & Discussion:     Narrative & Impression   CT CHEST, ABDOMEN AND PELVIS WITH IV CONTRAST     INDICATION: C50.511: Malignant neoplasm of lower-outer quadrant of right female breast  Z17.0: Estrogen receptor positive status (ER+).     Excerpt from Surgical Oncology progress note dated 7/18/2024:  \"This is a 49-year-old female went for her first mammogram with no family history of breast cancer and on smoker. She had a " "mammogram and ultrasound ultrasound identified a 35 mm right breast   mass BI-RADS 5 and for abnormal lymph nodes. This was followed by biopsy of the right breast mass and lymph nodes consistent with ER/LA positive HER2/divina negative invasive ductal carcinoma.\"     COMPARISON: None.     TECHNIQUE: CT examination of the chest, abdomen and pelvis was performed. Multiplanar 2D reformatted images were created from the source data.     This examination, like all CT scans performed in the [facility], was performed utilizing techniques to minimize radiation dose exposure, including the use of iterative reconstruction and automated exposure control. Radiation dose length   product (DLP) for this visit: 692 mGy-cm     IV Contrast: 100 mL of iohexol (OMNIPAQUE)  Enteric Contrast: Not administered.     FINDINGS:     CHEST     LUNGS: Lungs are clear. No tracheal or endobronchial lesion.     PLEURA: Unremarkable.     HEART/GREAT VESSELS: Heart is unremarkable for patient's age. No thoracic aortic aneurysm.     MEDIASTINUM AND LÓPEZ: Unremarkable.     CHEST WALL AND LOWER NECK: Right axillary surgical clips.     Partially imaged enlarged thyroid gland.     ABDOMEN     LIVER/BILIARY TREE: Unremarkable.     GALLBLADDER: Cholelithiasis without findings of acute cholecystitis.     SPLEEN: Unremarkable.     PANCREAS: Unremarkable.     ADRENAL GLANDS: Unremarkable.     KIDNEYS/URETERS: Nonobstructive 3 mm left lower pole calculus     STOMACH AND BOWEL: Unremarkable.     APPENDIX: No findings to suggest appendicitis.     ABDOMINOPELVIC CAVITY: No ascites. No pneumoperitoneum. No lymphadenopathy.     VESSELS: Unremarkable for patient's age.     PELVIS     REPRODUCTIVE ORGANS: Fibroid uterus.     URINARY BLADDER: Unremarkable.     ABDOMINAL WALL/INGUINAL REGIONS: Unremarkable.     BONES: No acute fracture or suspicious osseous lesion.     IMPRESSION:     No signs of metastatic disease in the chest, abdomen or pelvis.    "   BONE SCAN  WHOLE BODY     INDICATION: C50.511: Malignant neoplasm of lower-outer quadrant of right female breast  Z17.0: Estrogen receptor positive status (ER+)  Newly diagnosed breast cancer. Evaluate for metastatic disease     PREVIOUS FILM CORRELATION:    No prior bone scans. CT chest abdomen and pelvis, 7/18/2024     TECHNIQUE:   This study was performed following the intravenous administration of 24.9 mCi Tc-99m labeled MDP.  Delayed, anterior and posterior whole body images were acquired, 2-3 hours after radiopharmaceutical administration.     FINDINGS:     There is a normal radiopharmaceutical distribution of activity within the Long bone shaft, bony pelvis, spine, and skull. Bilateral renal activity and bladder activity noted. There is no scintigraphic evidence of osseous metastasis.     IMPRESSION:     No scintigraphic evidence of osseous metastasis.      Left breast, 7:00 position (core needle biopsy):  - Lobular neoplasia (ALH & LCIS, 4 foci with 5mm largest focus)    - Lobular neoplasia involves three (3) cores   - No invasive carcinoma identified    Right breast additional biopsies:Right breast, 12:00, MRI guided biopsy: - Unremarkable ductules and lobules in a background of dense stromal fibrosis. - Focal changes suggestive for prior biopsy. - Negative for atypical proliferative lesions, in situ carcinoma and invasive carcinoma. B. Right breast, 10:00, MRI guided biopsy: - Focus of columnar cell change and columnar cell hyperplasia with calcifications. - Background of unremarkable ductules and lobules in a background of dense stromal fibrosis. - Negative for atypical proliferative lesions, in situ carcinoma and   I did review bone scans, CT chest abdomen films, overall the pathology results in detail and discussed.  I also reviewed and discussed cancer disease status cancer treatment plans and cancer treatment goals with the patient.  They understand based on final pathology she may need additional  adjuvant therapy as well as plus minus radiation based on final pathology.  Surgical consent was obtained after explaining benefits, alternative, procedure and possible complications with regards above mentioned procedure. All her questions regarding the visit  as well as the  surgery were answered to  the patient's satisfaction.I did spend more than 70 minutes reviewing all the documents discussing with medical oncology team, patient examination and counseling and consenting for surgery after discussing benefit procedure alternative and possible complications.  she understands and  agrees . All patient questions were answered.       Advance Care Planning/Advance Directives:  I Mert Brink MD discussed the disease status with Stacey Villatoro  today 08/21/24  treatment plans and follow-up with the patient.

## 2024-08-22 ENCOUNTER — TELEPHONE (OUTPATIENT)
Dept: HEMATOLOGY ONCOLOGY | Facility: CLINIC | Age: 49
End: 2024-08-22

## 2024-08-22 ENCOUNTER — TELEPHONE (OUTPATIENT)
Dept: SURGICAL ONCOLOGY | Facility: CLINIC | Age: 49
End: 2024-08-22

## 2024-08-22 NOTE — TELEPHONE ENCOUNTER
I placed call to the patient and it went to .  I left my call back information and a brief message regarding her concerns over incoming medical bills and a possible option for assistance. ([facility] application with our [facility] financial Counselors)

## 2024-08-22 NOTE — TELEPHONE ENCOUNTER
Called patient left message to discuss surgical plan. Patient sent message to Dr. Brink yesterday stating she is more interested in delayed reconstruction. She spoke to plastic surgery department yesterday who gave her information as well. I advised over voicemail the choice is hers and I just wanted to know what exactly she may have been thinking. Left hopeline number for patient to call back to discuss.

## 2024-08-22 NOTE — TELEPHONE ENCOUNTER
Let message to make patient aware her appointment with dr milligan has been rescheduled. Her new appointment is 09/26/2024 @1:00 hopeline provided

## 2024-08-22 NOTE — TELEPHONE ENCOUNTER
Pt returned call to \[facility]\"" looking to get in touch with Gissell to discuss further.     Pt would like a call back to go over everything.    pt also stated that today is move-in day at her son's college, so she might be busy later today with that.

## 2024-08-23 ENCOUNTER — TELEPHONE (OUTPATIENT)
Dept: SURGICAL ONCOLOGY | Facility: CLINIC | Age: 49
End: 2024-08-23

## 2024-08-23 ENCOUNTER — LAB REQUISITION (OUTPATIENT)
Dept: LAB | Facility: HOSPITAL | Age: 49
End: 2024-08-23

## 2024-08-23 DIAGNOSIS — C50.911 MALIGNANT NEOPLASM OF UNSPECIFIED SITE OF RIGHT FEMALE BREAST (HCC): ICD-10-CM

## 2024-08-23 NOTE — PROGRESS NOTES
Call placed to patient regarding recommendation for;    ____ RIGHT ___X___LEFT      __X___SAVI  placement.    Procedure explained to patient, additional questions answered at this time    __X___Verbalized understanding.      Blood thinners:  _____yes __X___no    Date stopped: ___N/A____    All teaching points discussed during call, pt with no questions at this time, pt adv to arrive at 0915 for 0945 insertion    Pt given name/# for any further questions/needs

## 2024-08-23 NOTE — TELEPHONE ENCOUNTER
Stacey returned my call regarding surgery date discussion. We confirmed surgery Sept 19 at the [facility]. She is aware that the OR will call the night before with her exact arrival time, and is aware that she will be first case, which can be an approximate 6:30 AM arrival. Confirmed 8/30 consult with Dr. Archibald, and pre op appointment with Dr. Brikn 9/4. 3 week post op appointment scheduled on 10/9.

## 2024-08-23 NOTE — TELEPHONE ENCOUNTER
Called and spoke to patient. Apologized I didn't call her back yesterday but as it was move in day for college for her son I figured I would let her spend that time with her son and call her today. Patient was very appreciative as she stated she had many calls yesterday and is very overwhelmed. Patient discussed surgical plans and is agreeable to see Dr. Archibald as scheduled on 8/30. Patient agreeable to dr. Hyman's follow up on 9/26 and asked I make his team aware that she is agreeable to new appointment date and time with Dr. Hyman. Patient asked I inform [facility] to call her back to schedule zena's - she stated she is able to go at any time for the placement. Rationale provided as why zena is needed. Patient appreciative of time spent discussing procedures and recommendations. All questions answered at this time. I advised Dr. Brink's and Dr. Archibald surgery schedulers are working diligently to find open OR time to get her added and they will call to set up a H&P appointment as well as a post op appointment. Patient appreciative.

## 2024-08-23 NOTE — TELEPHONE ENCOUNTER
Phoned [employer] at [phone] to check on pt's Onco Type Dx results as the web site shows it as pending. I spoke to Micah who looked into this matter and said a LOMN is required and he would fax over to [phone] so that a prior auth can be obtained. I checked the fax multiple times and still have not received the request.

## 2024-08-23 NOTE — TELEPHONE ENCOUNTER
Left a message in regards to surgery date with Dr. Brink and plastics department. Requested a call back to discuss. Provided my direct number.

## 2024-08-27 ENCOUNTER — PATIENT OUTREACH (OUTPATIENT)
Dept: HEMATOLOGY ONCOLOGY | Facility: CLINIC | Age: 49
End: 2024-08-27

## 2024-08-30 ENCOUNTER — OFFICE VISIT (OUTPATIENT)
Age: 49
End: 2024-08-30
Payer: COMMERCIAL

## 2024-08-30 VITALS
HEIGHT: 63 IN | OXYGEN SATURATION: 98 % | TEMPERATURE: 98.1 F | HEART RATE: 77 BPM | BODY MASS INDEX: 26.4 KG/M2 | WEIGHT: 149 LBS | SYSTOLIC BLOOD PRESSURE: 152 MMHG | DIASTOLIC BLOOD PRESSURE: 93 MMHG

## 2024-08-30 DIAGNOSIS — C50.511 MALIGNANT NEOPLASM OF LOWER-OUTER QUADRANT OF RIGHT BREAST OF FEMALE, ESTROGEN RECEPTOR POSITIVE (HCC): ICD-10-CM

## 2024-08-30 DIAGNOSIS — Z17.0 MALIGNANT NEOPLASM OF LOWER-OUTER QUADRANT OF RIGHT BREAST OF FEMALE, ESTROGEN RECEPTOR POSITIVE (HCC): Primary | ICD-10-CM

## 2024-08-30 DIAGNOSIS — Z17.0 MALIGNANT NEOPLASM OF LOWER-OUTER QUADRANT OF RIGHT BREAST OF FEMALE, ESTROGEN RECEPTOR POSITIVE (HCC): ICD-10-CM

## 2024-08-30 DIAGNOSIS — C50.511 MALIGNANT NEOPLASM OF LOWER-OUTER QUADRANT OF RIGHT BREAST OF FEMALE, ESTROGEN RECEPTOR POSITIVE (HCC): Primary | ICD-10-CM

## 2024-08-30 PROCEDURE — 99205 OFFICE O/P NEW HI 60 MIN: CPT | Performed by: PLASTIC SURGERY

## 2024-08-30 NOTE — PROGRESS NOTES
Assessment & Plan   50 yo female for first stage breast reconstruction with large tumor of the right breast, for bilateral mastectomy  1.) Patient would be an excellent candidate for either autologous or implant-based reconstruction although the patient is leaning towards implant-based reconstruction  2.)  I discussed with her that regardless of her final decision the process would start with tissue expander placement at time of mastectomy  3.)  All her questions were answered to her satisfaction  4.)  We will coordinate with breast surgery and make arrangements to have the procedure performed in the OR.        Discussion- Discussion--discussed with patient her options for reconstruction, discussed with the patient the options for autologous versus implant based reconstruction.  I discussed with her that breast reconstruction is typically performed in stages with the 1st step being placement of a tissue expander at the time of mastectomy.  I discussed with her the risks, benefits, alternatives of tissue expander placement including the risk of bleeding, infection, scarring, poor wound healing, demonstrating underlying structures, need for further surgery, need for multiple procedures, mastectomy flap necrosis, partial mastectomy flap necrosis, the off-label use of acellular dermal matrix, the risk of breast implant associated anaplastic large cell lymphoma, the need for the expansion process, the risk of seroma, the risk of DVT, risk of asymmetry, the need for multiple procedures, the need for revision, poor aesthetic result, asymmetry.  I discussed with her the use of drains, the use of lori 90 minutes negative pressure wound therapy dressings, the expected recovery time, the expected hospital stay, I discussed with her the reconstructive expansion process.  I discussed with her that at the completion of expansion we would need at least 3 months after the completion of expansion no post mastectomy treatment is  needed.  I discussed with her the need for chemotherapy radiation with push that time when back.  I discussed with her the next step for reconstruction options include the use of implant based reconstruction, I discussed with her the risks, benefits, alternatives exchange procedure, the implants do not typically fair as well with radiation, I discussed with her the use of silicone implants, the risk of leak, the clinical significance of implant leak, I discussed with her the screening for MRI recommendations.  I discussed with her the option of autologous based reconstruction.  I discussed with her the nature of tram flap surgery versus PERRY flap surgery, and discussion of complex nature of micro surgery, the risks, benefits and alternate is of abdominally based autologous reconstruction including the above with the additional risk of micro surgical failure, flap failure, need for ICU stay, need for multiple procedures, need for revision, we discussed the option of nipple reconstruction.  All the patient's questions were answered to her satisfaction.    The patient has decided that she would like to proceed with planning for reconstruction, I discussed with her the 1st step is for expander placement at the time of mastectomy.  We discussed the risks, benefits, alternatives of expander placement including the use of acellular dermal matrix, need for expansion, the risk of bleeding, infection, scarring, poor wound healing, damage surrounding and underlying structures, need for further surgery, need for multiple procedures, risk of DVT, seroma, the use of drains, use of lori negative pressure wound therapy dressing, asymmetry, need for revision, need for multiple procedures, need for the exchange for 2nd stage reconstruction, the need for the expansion process,, the risk of implant failure, the risk of expander failure, the possibility for implant infection, the need for exchange, mastectomy flap necrosis, partial  mastectomy flap necrosis, we discussed the use of drains, the expected hospital stay, the expected recovery time, all the patient's questions are answered to her satisfaction, informed consent obtained and signed, we will proceed to the OR as scheduled.    Counseling dominated visit, total counseling time 45 minutes, total visit time 60 minutes including documentation, review of her chart and coordination of care.      Subjective   Patient ID: Stacey Villatoro is a 49 y.o. female.    Vitals:    08/30/24 1057   BP: 152/93   Pulse: 77   Temp: 98.1 °F (36.7 °C)   SpO2: 98%     HPI    49-year-old female who is here today to discuss breast reconstruction options.  The patient has been diagnosed with a large right-sided breast cancer, she is here today to discuss possible reconstructive options.  The patient had been previously up-to-date with her mammograms, she does not have any chronic medical conditions, she does not have a strong family history of breast cancer, she is a non-smoker, she does not take steroids or blood thinners.    Review of Systems   All other systems reviewed and are negative.      Objective   Physical Exam   Constitutional  She appears well-developed and well-nourished.     Eyes  Pupils are equal, round, and reactive to light. System normal.     General -             Right: Right eye extraocular movements are normal.             Left: Left eye extraocular movements are normal.       Skin    No skin changes apparent     Psychiatric  She has a normal mood and affect. Her behavior is normal. Judgment and thought content normal.         Bilateral breasts- grade III ptosis, palpable mass of right breast, no palpable masses left breast  Measurements- R- SN 30 NIMF 10 BW 16                             L- SN 32 NIMF15 BW 15 NN 16      Past Medical History:   Diagnosis Date    Arthritis     BRCA1 negative     BRCA2 negative     Breast cancer (HCC)     Breast mass     Cancer (HCC)      Past Surgical History:    Procedure Laterality Date    BREAST BIOPSY Right 2024     SECTION      x2    FOOT SURGERY      MRI BREAST BIOPSY LEFT (ALL INCLUSIVE) Left 2024    MRI BREAST BIOPSY RIGHT (ALL INCLUSIVE) Right 2024    US GUIDED BREAST BIOPSY RIGHT COMPLETE Right 2024    US GUIDED BREAST LYMPH NODE BIOPSY RIGHT Right 2024     Current Outpatient Medications   Medication Instructions    methylPREDNISolone 4 MG tablet therapy pack Use as directed on package       Social History     Social History Narrative    Lives with  and kids     Work for [employer]      Social History     Tobacco Use   Smoking Status Never    Passive exposure: Never   Smokeless Tobacco Never

## 2024-08-30 NOTE — TELEPHONE ENCOUNTER
Sent e mail to our [employer] rep Vinay Hua asking him to assist in the matter of getting results for pt's Onco Type Dx results

## 2024-09-03 ENCOUNTER — HOSPITAL ENCOUNTER (OUTPATIENT)
Dept: MAMMOGRAPHY | Facility: CLINIC | Age: 49
Discharge: HOME | End: 2024-09-03
Payer: COMMERCIAL

## 2024-09-03 VITALS — DIASTOLIC BLOOD PRESSURE: 86 MMHG | HEART RATE: 91 BPM | SYSTOLIC BLOOD PRESSURE: 124 MMHG

## 2024-09-03 DIAGNOSIS — R92.8 ABNORMAL MAMMOGRAM: ICD-10-CM

## 2024-09-03 PROCEDURE — 19281 PERQ DEVICE BREAST 1ST IMAG: CPT

## 2024-09-03 RX ORDER — LIDOCAINE HYDROCHLORIDE 10 MG/ML
5 INJECTION, SOLUTION EPIDURAL; INFILTRATION; INTRACAUDAL; PERINEURAL ONCE
Status: COMPLETED | OUTPATIENT
Start: 2024-09-03 | End: 2024-09-03

## 2024-09-03 RX ADMIN — LIDOCAINE HYDROCHLORIDE 5 ML: 10 INJECTION, SOLUTION EPIDURAL; INFILTRATION; INTRACAUDAL; PERINEURAL at 10:10

## 2024-09-03 NOTE — PROGRESS NOTES
Procedure type:    _____ultrasound guided __x___stereotactic    Breast:    ___x__Left _____Right    Location: 7 o'clock     Needle: 16 gauge 5 cm     # of passes: N/A    Clip: SAMY      Performed by: Dr. Ubaldo Espinoza     Pressure held for 5 minutes by: Cristal Corbett     Stermelody Strips:    __x___yes _____no    Band aid:    __x___yes_____no    Tape and guaze:    _____yes __x___no    Tolerated procedure:    ___x__yes _____no

## 2024-09-03 NOTE — PROGRESS NOTES
Ice pack given:    __X___yes _____no    Discharge instructions reviewed and given to patient:    __X___yes _____no    Discharged via:    __X___amulatory    _____wheelchair    _____stretcher    Biopsy site clean and dry with no bleeding on discharge:    __X___yes ____no  Patient is scheduled for surgery on 09/19/2024 with Dr. Jasen Brink. Ok to leave a message.

## 2024-09-04 ENCOUNTER — TELEPHONE (OUTPATIENT)
Dept: PLASTIC SURGERY | Facility: CLINIC | Age: 49
End: 2024-09-04

## 2024-09-04 ENCOUNTER — TELEPHONE (OUTPATIENT)
Dept: ANESTHESIOLOGY | Facility: CLINIC | Age: 49
End: 2024-09-04

## 2024-09-04 ENCOUNTER — TELEPHONE (OUTPATIENT)
Dept: SURGICAL ONCOLOGY | Facility: CLINIC | Age: 49
End: 2024-09-04

## 2024-09-04 ENCOUNTER — OFFICE VISIT (OUTPATIENT)
Dept: SURGICAL ONCOLOGY | Facility: CLINIC | Age: 49
End: 2024-09-04
Payer: COMMERCIAL

## 2024-09-04 VITALS
DIASTOLIC BLOOD PRESSURE: 101 MMHG | OXYGEN SATURATION: 99 % | SYSTOLIC BLOOD PRESSURE: 142 MMHG | WEIGHT: 150 LBS | HEART RATE: 97 BPM | BODY MASS INDEX: 26.58 KG/M2 | HEIGHT: 63 IN

## 2024-09-04 DIAGNOSIS — Z17.0 MALIGNANT NEOPLASM OF LOWER-OUTER QUADRANT OF RIGHT BREAST OF FEMALE, ESTROGEN RECEPTOR POSITIVE (HCC): Primary | ICD-10-CM

## 2024-09-04 DIAGNOSIS — C50.911 CARCINOMA OF RIGHT BREAST METASTATIC TO AXILLARY LYMPH NODE (HCC): ICD-10-CM

## 2024-09-04 DIAGNOSIS — C50.511 MALIGNANT NEOPLASM OF LOWER-OUTER QUADRANT OF RIGHT BREAST OF FEMALE, ESTROGEN RECEPTOR POSITIVE (HCC): Primary | ICD-10-CM

## 2024-09-04 DIAGNOSIS — C77.3 CARCINOMA OF RIGHT BREAST METASTATIC TO AXILLARY LYMPH NODE (HCC): ICD-10-CM

## 2024-09-04 DIAGNOSIS — Z01.818 PRE-OP EXAMINATION: ICD-10-CM

## 2024-09-04 PROCEDURE — 99215 OFFICE O/P EST HI 40 MIN: CPT | Performed by: SURGERY

## 2024-09-04 NOTE — PROGRESS NOTES
Surgical Oncology Follow Up  [facility]  [facility] [place]  [address] [id number]    Stacey Villatoro  1975  [id number]      Chief Complaint   Patient presents with   • Pre-op Exam     Surgery Discussion        Assessment & Plan:   49-year-old female with right breast cancer with palpable lymph nodes ER/MN positive HER2 negative tumor 35 mm.  Patient was seen by medical oncologist plan for adjuvant therapy.  She also had a left breast biopsy consistent with ALH.  Given she has palpable lymph nodes plan for right modified radical mastectomy with left mastectomy.  Surgical consent has been obtained after explaining the benefit procedure alternative and possible complications.  All patient's questions answered to her satisfaction.  She has already seen plastic surgery for immediate tissue expander reconstruction.  We will arrange surgery    Cancer History:     Oncology History   Malignant neoplasm of lower-outer quadrant of right breast of female, estrogen receptor positive (HCC)   7/11/2024 Initial Diagnosis    Malignant neoplasm of lower-outer quadrant of right breast of female, estrogen receptor positive (HCC)     7/11/2024 Biopsy    Right breast ultrasound guided biopsy  A. 7 o'clock 7 cm from nipple  Invasive breast carcinoma of no special type (ductal NST/invasive ductal carcinoma)   Grade 2  ER 95  MN 61  HER2 1+  No lymphovascular invasion    B. Right axillary lymph node  Invasive ductal carcinoma of mammary origin, involving fibroadipose tissue, see comment.   Lymphoid tissue is not identified.    C. Right axillary lymph node  Invasive ductal carcinoma of mammary origin, involving fibroadipose tissue, see comment.   Lymphoid tissue is not identified.    Comment: Parts B and C are submitted as axillary lymph node, however no lymphoid tissue is identified. Carcinoma is present with expression of Jennifer-3 (performed on B/C) supporting the above  diagnosis. However, clinical and radiographic correlation is advised as these findings may represent entire andria replacement by metastatic carcinoma or direct extension of the tumor.     Right malignancy appears unifocal. The biopsy-proven carcinoma measured 0.6 cm on ultrasound. There is metastatic disease to a right axillary lymph node.  A second abnormal appearing lymph node is noted in the right axilla (not biopsied). Recent imaging of the contralateral left breast dated 6/18/2024 was reviewed and shows no suspicious findings.      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 No pathogenic sequence variants or deletions/duplications identified           Interval History:   Follow-up with right breast cancer with metastasis to axillary lymph node    Review of Systems:   Review of Systems   Constitutional:  Negative for chills and fever.   HENT:  Negative for ear pain and sore throat.    Eyes:  Negative for pain and visual disturbance.   Respiratory:  Negative for cough and shortness of breath.    Cardiovascular:  Negative for chest pain and palpitations.   Gastrointestinal:  Negative for abdominal pain and vomiting.   Genitourinary:  Negative for dysuria and hematuria.   Musculoskeletal:  Negative for arthralgias and back pain.   Skin:  Negative for color change and rash.   Neurological:  Negative for seizures and syncope.   All other systems reviewed and are negative.    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)     Past Medical History:   Diagnosis Date   • Arthritis    • BRCA1 negative    • BRCA2 negative    • Breast cancer (HCC)    • Breast mass    • Cancer (HCC)      Past Surgical History:   Procedure Laterality Date   • BREAST BIOPSY Right 2024   •  SECTION      x2   • FOOT SURGERY     • MAMMO NEEDLE LOCALIZATION LEFT (ALL INC) Left 9/3/2024   • MRI BREAST BIOPSY LEFT (ALL INCLUSIVE) Left 2024   • MRI BREAST BIOPSY RIGHT (ALL INCLUSIVE) Right 2024   • US GUIDED BREAST BIOPSY RIGHT COMPLETE Right 2024   • US GUIDED BREAST LYMPH NODE BIOPSY RIGHT Right 2024     Family History   Problem Relation Age of Onset   • Dementia Mother    • Lung cancer Mother    • Heart disease Father    • Skin  cancer Father    • Heart attack Father         AT THE AGE OF 60   • No Known Problems Son    • No Known Problems Daughter    • No Known Problems Maternal Grandmother    • No Known Problems Maternal Grandfather    • No Known Problems Paternal Grandmother    • No Known Problems Paternal Grandfather    • Breast cancer Neg Hx      Social History     Socioeconomic History   • Marital status: /Civil Union     Spouse name: Not on file   • Number of children: Not on file   • Years of education: Not on file   • Highest education level: Not on file   Occupational History   • Not on file   Tobacco Use   • Smoking status: Never     Passive exposure: Never   • Smokeless tobacco: Never   Vaping Use   • Vaping status: Never Used   Substance and Sexual Activity   • Alcohol use: Not Currently     Comment: Very rarely, 1 or 2 beers if i do.   • Drug use: No   • Sexual activity: Yes     Partners: Male     Comment: Tubes tied 16 years ago   Other Topics Concern   • Not on file   Social History Narrative    Lives with  and kids     Work for [employer]      Social Determinants of Health     Financial Resource Strain: Not on file   Food Insecurity: Not on file   Transportation Needs: Not on file   Physical Activity: Not on file   Stress: Not on file   Social Connections: Not on file   Intimate Partner Violence: Not on file   Housing Stability: Not on file     No current outpatient medications on file.  No Known Allergies    Physical Exam:     Vitals:    09/04/24 1026   BP: (!) 142/101   Pulse: 97   SpO2: 99%     Physical Exam  Constitutional:       Appearance: Normal appearance.   HENT:      Head: Normocephalic and atraumatic.      Nose: Nose normal.      Mouth/Throat:      Mouth: Mucous membranes are moist.   Eyes:      Pupils: Pupils are equal, round, and reactive to light.   Cardiovascular:      Rate and Rhythm: Normal rate.      Pulses: Normal pulses.      Heart sounds: Normal heart sounds.   Pulmonary:      Effort:  Pulmonary effort is normal.      Breath sounds: Normal breath sounds.   Chest:          Comments: Right breast palpable mass at 7:00 7 cm from the nipple.  Right axillary palpable level 1 lymph nodes  Left breast no palpable mass masses nipple discharge nipple retraction or skin changes.  Left axillary and supraclavicular examination no palpable adenopathy.  Patient was examined seated as well as supine position.  Abdominal:      General: Bowel sounds are normal.      Palpations: Abdomen is soft.   Musculoskeletal:         General: Normal range of motion.      Cervical back: Normal range of motion and neck supple.   Skin:     General: Skin is warm.   Neurological:      General: No focal deficit present.      Mental Status: She is alert and oriented to person, place, and time.   Psychiatric:         Mood and Affect: Mood normal.         Behavior: Behavior normal.         Thought Content: Thought content normal.         Judgment: Judgment normal.       Results & Discussion:   I did review all the images, medical oncologist note, pathology results in detail and discussed.  Surgical consent was obtained after explaining benefits, alternative, procedure and possible complications with regards above mentioned procedure. All her questions regarding the visit  as well as the  surgery were answered to  the patient's satisfaction.we did discuss the cancer disease status, cancer treatment plan and cancer treatment goals with the patient in detail.  She understands she will need adjuvant therapy post surgery possibly including whole breast radiation.  she understands and  agrees . All patient questions were answered.       Advance Care Planning/Advance Directives:  I Mert Brink MD discussed the disease status with Stacey Villatoro  today 09/04/24  treatment plans and follow-up with the patient.

## 2024-09-04 NOTE — TELEPHONE ENCOUNTER
called pod in regards to ADA forms. Kizzy transferred the call to me.  indicated he needed forms filled out before 9/11/24 per his employer. I spoke with Radha who advised that he could drop them off tomorrow morning before 9:30 and she will have them ready for pickup by Friday. Relayed msg to  and he will drop them off at the [place] location tomorrow morning.

## 2024-09-04 NOTE — H&P (VIEW-ONLY)
Surgical Oncology Follow Up  [facility]  [facility] [place]  [address] [facility] [address]    Stacey Villatoro  1975  [id number]      Chief Complaint   Patient presents with   • Pre-op Exam     Surgery Discussion        Assessment & Plan:   49-year-old female with right breast cancer with palpable lymph nodes ER/WA positive HER2 negative tumor 35 mm.  Patient was seen by medical oncologist plan for adjuvant therapy.  She also had a left breast biopsy consistent with ALH.  Given she has palpable lymph nodes plan for right modified radical mastectomy with left mastectomy.  Surgical consent has been obtained after explaining the benefit procedure alternative and possible complications.  All patient's questions answered to her satisfaction.  She has already seen plastic surgery for immediate tissue expander reconstruction.  We will arrange surgery    Cancer History:     Oncology History   Malignant neoplasm of lower-outer quadrant of right breast of female, estrogen receptor positive (HCC)   7/11/2024 Initial Diagnosis    Malignant neoplasm of lower-outer quadrant of right breast of female, estrogen receptor positive (HCC)     7/11/2024 Biopsy    Right breast ultrasound guided biopsy  A. 7 o'clock 7 cm from nipple  Invasive breast carcinoma of no special type (ductal NST/invasive ductal carcinoma)   Grade 2  ER 95  WA 61  HER2 1+  No lymphovascular invasion    B. Right axillary lymph node  Invasive ductal carcinoma of mammary origin, involving fibroadipose tissue, see comment.   Lymphoid tissue is not identified.    C. Right axillary lymph node  Invasive ductal carcinoma of mammary origin, involving fibroadipose tissue, see comment.   Lymphoid tissue is not identified.    Comment: Parts B and C are submitted as axillary lymph node, however no lymphoid tissue is identified. Carcinoma is present with expression of Jennifer-3 (performed on B/C) supporting the above  diagnosis. However, clinical and radiographic correlation is advised as these findings may represent entire andria replacement by metastatic carcinoma or direct extension of the tumor.     Right malignancy appears unifocal. The biopsy-proven carcinoma measured 0.6 cm on ultrasound. There is metastatic disease to a right axillary lymph node.  A second abnormal appearing lymph node is noted in the right axilla (not biopsied). Recent imaging of the contralateral left breast dated 6/18/2024 was reviewed and shows no suspicious findings.      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 No pathogenic sequence variants or deletions/duplications identified           Interval History:   Follow-up with right breast cancer with metastasis to axillary lymph node    Review of Systems:   Review of Systems   Constitutional:  Negative for chills and fever.   HENT:  Negative for ear pain and sore throat.    Eyes:  Negative for pain and visual disturbance.   Respiratory:  Negative for cough and shortness of breath.    Cardiovascular:  Negative for chest pain and palpitations.   Gastrointestinal:  Negative for abdominal pain and vomiting.   Genitourinary:  Negative for dysuria and hematuria.   Musculoskeletal:  Negative for arthralgias and back pain.   Skin:  Negative for color change and rash.   Neurological:  Negative for seizures and syncope.   All other systems reviewed and are negative.    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)     Past Medical History:   Diagnosis Date   • Arthritis    • BRCA1 negative    • BRCA2 negative    • Breast cancer (HCC)    • Breast mass    • Cancer (HCC)      Past Surgical History:   Procedure Laterality Date   • BREAST BIOPSY Right 2024   •  SECTION      x2   • FOOT SURGERY     • MAMMO NEEDLE LOCALIZATION LEFT (ALL INC) Left 9/3/2024   • MRI BREAST BIOPSY LEFT (ALL INCLUSIVE) Left 2024   • MRI BREAST BIOPSY RIGHT (ALL INCLUSIVE) Right 2024   • US GUIDED BREAST BIOPSY RIGHT COMPLETE Right 2024   • US GUIDED BREAST LYMPH NODE BIOPSY RIGHT Right 2024     Family History   Problem Relation Age of Onset   • Dementia Mother    • Lung cancer Mother    • Heart disease Father    • Skin  cancer Father    • Heart attack Father         AT THE AGE OF 60   • No Known Problems Son    • No Known Problems Daughter    • No Known Problems Maternal Grandmother    • No Known Problems Maternal Grandfather    • No Known Problems Paternal Grandmother    • No Known Problems Paternal Grandfather    • Breast cancer Neg Hx      Social History     Socioeconomic History   • Marital status: /Civil Union     Spouse name: Not on file   • Number of children: Not on file   • Years of education: Not on file   • Highest education level: Not on file   Occupational History   • Not on file   Tobacco Use   • Smoking status: Never     Passive exposure: Never   • Smokeless tobacco: Never   Vaping Use   • Vaping status: Never Used   Substance and Sexual Activity   • Alcohol use: Not Currently     Comment: Very rarely, 1 or 2 beers if i do.   • Drug use: No   • Sexual activity: Yes     Partners: Male     Comment: Tubes tied 16 years ago   Other Topics Concern   • Not on file   Social History Narrative    Lives with  and kids     Work for [employer]      Social Determinants of Health     Financial Resource Strain: Not on file   Food Insecurity: Not on file   Transportation Needs: Not on file   Physical Activity: Not on file   Stress: Not on file   Social Connections: Not on file   Intimate Partner Violence: Not on file   Housing Stability: Not on file     No current outpatient medications on file.  No Known Allergies    Physical Exam:     Vitals:    09/04/24 1026   BP: (!) 142/101   Pulse: 97   SpO2: 99%     Physical Exam  Constitutional:       Appearance: Normal appearance.   HENT:      Head: Normocephalic and atraumatic.      Nose: Nose normal.      Mouth/Throat:      Mouth: Mucous membranes are moist.   Eyes:      Pupils: Pupils are equal, round, and reactive to light.   Cardiovascular:      Rate and Rhythm: Normal rate.      Pulses: Normal pulses.      Heart sounds: Normal heart sounds.   Pulmonary:      Effort:  Pulmonary effort is normal.      Breath sounds: Normal breath sounds.   Chest:          Comments: Right breast palpable mass at 7:00 7 cm from the nipple.  Right axillary palpable level 1 lymph nodes  Left breast no palpable mass masses nipple discharge nipple retraction or skin changes.  Left axillary and supraclavicular examination no palpable adenopathy.  Patient was examined seated as well as supine position.  Abdominal:      General: Bowel sounds are normal.      Palpations: Abdomen is soft.   Musculoskeletal:         General: Normal range of motion.      Cervical back: Normal range of motion and neck supple.   Skin:     General: Skin is warm.   Neurological:      General: No focal deficit present.      Mental Status: She is alert and oriented to person, place, and time.   Psychiatric:         Mood and Affect: Mood normal.         Behavior: Behavior normal.         Thought Content: Thought content normal.         Judgment: Judgment normal.       Results & Discussion:   I did review all the images, medical oncologist note, pathology results in detail and discussed.  Surgical consent was obtained after explaining benefits, alternative, procedure and possible complications with regards above mentioned procedure. All her questions regarding the visit  as well as the  surgery were answered to  the patient's satisfaction.we did discuss the cancer disease status, cancer treatment plan and cancer treatment goals with the patient in detail.  She understands she will need adjuvant therapy post surgery possibly including whole breast radiation.  she understands and  agrees . All patient questions were answered.       Advance Care Planning/Advance Directives:  I Mert Brink MD discussed the disease status with Stacey Villatoro  today 09/04/24  treatment plans and follow-up with the patient.

## 2024-09-04 NOTE — TELEPHONE ENCOUNTER
I had received a VM while I was OOO 24:    Hi, my name is Vinod Villatoro. My phone number is [phone]. My wife, Stacey Villatoro, was recently diagnosed with breast cancer and we have a lot of bills coming in. And you're the  Someone keeps calling her to set up a payment plan, but she has her surgery coming up in chemo coming up and we don't want to set anything up until all of the bills are like ready, if that makes sense. So if you could give me a call back and we can come to some kind of agreement, I would really appreciate it. Thank you.    I did not have the patient's  but was able to find the patient through the phone number.  I would call both the  Vinod and the patient and both calls went to .  I left my call back information and a brief message regarding the patient's concerns over bills and that I had previously call the patient on 24 that also went to .  An application request was e mail to our Hospital Financial Counselor team on 24 to mail an application to the patient.    The patient's  called me back and he stated that a payment plan might be an option but waiting on the outcome of surgery, etc..   I stated that because I had seen a large Self pay balance an application had been requested previously through the Hospital Financial Counselor team on 24.  I could request another application to be mailed to the patient and the  asked if I could e mail him the application to his work e mail [email or web address] for him to review.  I will also provide him with the [facility] team's number should he have any questions on what is required or to set up an appointment at one of the [facility] for a quicker determination.

## 2024-09-05 ENCOUNTER — TELEPHONE (OUTPATIENT)
Dept: ANESTHESIOLOGY | Facility: CLINIC | Age: 49
End: 2024-09-05

## 2024-09-10 ENCOUNTER — PATIENT OUTREACH (OUTPATIENT)
Dept: HEMATOLOGY ONCOLOGY | Facility: CLINIC | Age: 49
End: 2024-09-10

## 2024-09-10 NOTE — PROGRESS NOTES
I reached out and spoke to Stacey to follow up with them and to review for any changes in barriers to care and offer supportive services as needed.    Barriers noted previously: N/A    Current barriers and interventions provided: N/A      This patient will no longer require follow up.  I have provided care team contact information to the patient and welcome them to contact me if their needs as discussed above change.  Patient is currently not interested in scheduling an appointment for Survivorship and is aware who to contact if her needs change. Patient was appreciative of the phone call.

## 2024-09-11 ENCOUNTER — TELEMEDICINE (OUTPATIENT)
Dept: ANESTHESIOLOGY | Facility: CLINIC | Age: 49
End: 2024-09-11
Payer: COMMERCIAL

## 2024-09-11 DIAGNOSIS — Z17.0 MALIGNANT NEOPLASM OF LOWER-OUTER QUADRANT OF RIGHT BREAST OF FEMALE, ESTROGEN RECEPTOR POSITIVE (HCC): Primary | ICD-10-CM

## 2024-09-11 DIAGNOSIS — C50.511 MALIGNANT NEOPLASM OF LOWER-OUTER QUADRANT OF RIGHT BREAST OF FEMALE, ESTROGEN RECEPTOR POSITIVE (HCC): Primary | ICD-10-CM

## 2024-09-11 PROCEDURE — 99214 OFFICE O/P EST MOD 30 MIN: CPT | Performed by: NURSE PRACTITIONER

## 2024-09-11 NOTE — PROGRESS NOTES
[facility]  CONSULT: SURGERY OPTIMIZATION    Brief Visit      Name: Stacey Villatoro      : 1975      MRN: [id number]  Encounter Provider: ELVIA Ye  Encounter Date: 2024   Encounter department: [facility]    This Visit is being completed by telephone. The Patient is located at Home and in the following state in which I hold an active license [place]    The patient was identified by name and date of birth. Stacey Villatoro was informed that this is a telemedicine visit and that the visit is being conducted through Telephone.  My office door was closed. No one else was in the room.  She acknowledged consent and understanding of privacy and security of the  platform. The patient has agreed to participate and understands they can discontinue the visit at any time.    Patient is aware this is a billable service.     ASSESSMENT     49 year old female referred to SOC for pre-surgery optimization & BEST program   Other consult concerns include: BEST. 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) [C50.911, C77.3]      Atypical lobular hyperplasia (ALH) of left breast [N60.92]   Pre-op diagnosis:      Malignant neoplasm of lower-outer quadrant of right breast of female, estrogen receptor positive (HCC) [C50.511, Z17.0]      Carcinoma of right breast metastatic to  axillary lymph node (HCC) [C50.911, C77.3]      Atypical lobular hyperplasia (ALH) of left breast [N60.92]   Location: [facility] OR ROOM 01 / [facility]   Surgeons: Mert Brink MD; Jonathan Archibald MD     LAST ANESTHESIA   NO CONCERNS   Assessment & Plan  Malignant neoplasm of lower-outer quadrant of right breast of female, estrogen receptor positive (HCC)    SEEN FOR SO   SEEN FOR BEST   ADMITS TO BEING IN WELL HEALTH TODAY   METS 9   DENIES CP AND DENIES SOB   At risk for post-op IDALIA - NO   At risk for post-op SSI - NO   PATS REVIEWED AS STABLE   BEST   Breathing- instructed to exercise lungs prior to surgery via DEEP BREATHING   Eat- discussed increasing protein intake prior to surgery   Sleep/stress- encouraged 8-10 sleep @ night, stress reduction, avoid sick contacts and handwashing   Train- encouraged to remain active             History of Present Illness     Stacey is a 49-year-old female referred to [facility] for presurgery optimization.  She explains she had her first routine screening mammogram and unfortunately they found an area of concern.  She went for a biopsy which led to the diagnosis of cancer.  She is now electing to have a bilateral mastectomy.    I spoke to the patient on the telephone.  We did not connect via video.  She was offered a live visit in the SOC and declined.  States she lives too far away.  She lives at home.  She has support.  Her support person is her .  She admits to being in well health today.  Denies any new developments in her health.  METS is 9.  Denies chest pain & denies shortness of breath.  Blood work from 1 month ago was reviewed as stable.  Surgical optimization complete.  IDALIA risk low.  SSI risk low.  SOC anemia-no needs.  SOC to mail patient carb drinks.    As always we discussed having your BEST surgery, and BEST recovery.  Surgery goals reviewed today.      Breathing exercises   Patient was encouraged to begin lung exercises  today.  This could be accomplished through deep breathing and cough exercises.    Eating/nutrition   Encouraged patient to increase oral protein intake prior to surgery.   This can be accomplished by consuming chicken, fish, tuna fish, cottage cheese, cheese, eggs, Greek yogurt, and protein shakes as needed.  I encouraged use of protein shakes such ENLIVE.  I also recommended making your own protein shakes with protein powder.   Sleep/Stress management  Patient was encouraged to rest their body prior to surgery.  Encouraged attempting to get 8 hours of sleep at night.  Avoid stress.  Avoid sick contacts.  Encouraged to find a relaxing hobby such as reading, meditation, listening to music.  Training exercises  Patient was encouraged to remain active as possible.  Today bilateral lower extremity generic exercises were taught for muscle strengthening and balance.  All exercises to be done sitting down.       Denies fevers and denies chills  Denies congestion and denies sore throat  Denies chest pain  Denies palpitations  Denies shortness of breath  Denies abdominal pain  Denies nausea, vomiting, and diarrhea  Denies any issues with their skin... example NO open wounds or sores  Denies rashes  Denies difficulty urinating  Denies any issues with their urine... example a dark color or an odor  Denies dizziness  Denies headaches  Denies confusion and denies hallucinations    Admits to being in well health today     Physical Assessment   We did not connect via video  Patient was alert and orientated times 3  Mood appropriate  Thought appropriate    I heard no respiratory distress over the phone today        Visit Time  Total Visit Duration: 30 MINUTES

## 2024-09-11 NOTE — H&P
Assessment & Plan  50 yo female for first stage breast reconstruction with large tumor of the right breast, for bilateral mastectomy  1.) Patient would be an excellent candidate for either autologous or implant-based reconstruction although the patient is leaning towards implant-based reconstruction  2.)  I discussed with her that regardless of her final decision the process would start with tissue expander placement at time of mastectomy  3.)  All her questions were answered to her satisfaction  4.)  We will coordinate with breast surgery and make arrangements to have the procedure performed in the OR.           Discussion- Discussion--discussed with patient her options for reconstruction, discussed with the patient the options for autologous versus implant based reconstruction.  I discussed with her that breast reconstruction is typically performed in stages with the 1st step being placement of a tissue expander at the time of mastectomy.  I discussed with her the risks, benefits, alternatives of tissue expander placement including the risk of bleeding, infection, scarring, poor wound healing, demonstrating underlying structures, need for further surgery, need for multiple procedures, mastectomy flap necrosis, partial mastectomy flap necrosis, the off-label use of acellular dermal matrix, the risk of breast implant associated anaplastic large cell lymphoma, the need for the expansion process, the risk of seroma, the risk of DVT, risk of asymmetry, the need for multiple procedures, the need for revision, poor aesthetic result, asymmetry.  I discussed with her the use of drains, the use of lori 90 minutes negative pressure wound therapy dressings, the expected recovery time, the expected hospital stay, I discussed with her the reconstructive expansion process.  I discussed with her that at the completion of expansion we would need at least 3 months after the completion of expansion no post mastectomy treatment is  needed.  I discussed with her the need for chemotherapy radiation with push that time when back.  I discussed with her the next step for reconstruction options include the use of implant based reconstruction, I discussed with her the risks, benefits, alternatives exchange procedure, the implants do not typically fair as well with radiation, I discussed with her the use of silicone implants, the risk of leak, the clinical significance of implant leak, I discussed with her the screening for MRI recommendations.  I discussed with her the option of autologous based reconstruction.  I discussed with her the nature of tram flap surgery versus PERRY flap surgery, and discussion of complex nature of micro surgery, the risks, benefits and alternate is of abdominally based autologous reconstruction including the above with the additional risk of micro surgical failure, flap failure, need for ICU stay, need for multiple procedures, need for revision, we discussed the option of nipple reconstruction.  All the patient's questions were answered to her satisfaction.     The patient has decided that she would like to proceed with planning for reconstruction, I discussed with her the 1st step is for expander placement at the time of mastectomy.  We discussed the risks, benefits, alternatives of expander placement including the use of acellular dermal matrix, need for expansion, the risk of bleeding, infection, scarring, poor wound healing, damage surrounding and underlying structures, need for further surgery, need for multiple procedures, risk of DVT, seroma, the use of drains, use of lori negative pressure wound therapy dressing, asymmetry, need for revision, need for multiple procedures, need for the exchange for 2nd stage reconstruction, the need for the expansion process,, the risk of implant failure, the risk of expander failure, the possibility for implant infection, the need for exchange, mastectomy flap necrosis, partial  mastectomy flap necrosis, we discussed the use of drains, the expected hospital stay, the expected recovery time, all the patient's questions are answered to her satisfaction, informed consent obtained and signed, we will proceed to the OR as scheduled.     Counseling dominated visit, total counseling time 45 minutes, total visit time 60 minutes including documentation, review of her chart and coordination of care.              Subjective  Patient ID: Stacey Villatoro is a 49 y.o. female.         Vitals:     08/30/24 1057   BP: 152/93   Pulse: 77   Temp: 98.1 °F (36.7 °C)   SpO2: 98%      HPI     49-year-old female who is here today to discuss breast reconstruction options.  The patient has been diagnosed with a large right-sided breast cancer, she is here today to discuss possible reconstructive options.  The patient had been previously up-to-date with her mammograms, she does not have any chronic medical conditions, she does not have a strong family history of breast cancer, she is a non-smoker, she does not take steroids or blood thinners.     Review of Systems   All other systems reviewed and are negative.              Objective  Physical Exam   Constitutional  She appears well-developed and well-nourished.      Eyes  Pupils are equal, round, and reactive to light. System normal.      General -             Right: Right eye extraocular movements are normal.             Left: Left eye extraocular movements are normal.         Skin     No skin changes apparent      Psychiatric  She has a normal mood and affect. Her behavior is normal. Judgment and thought content normal.            Bilateral breasts- grade III ptosis, palpable mass of right breast, no palpable masses left breast  Measurements- R- SN 30 NIMF 10 BW 16                             L- SN 32 NIMF15 BW 15 NN 16       Medical History        Past Medical History:   Diagnosis Date    Arthritis      BRCA1 negative      BRCA2 negative      Breast cancer (HCC)       Breast mass      Cancer (HCC)           Surgical History         Past Surgical History:   Procedure Laterality Date    BREAST BIOPSY Right 2024     SECTION         x2    FOOT SURGERY        MRI BREAST BIOPSY LEFT (ALL INCLUSIVE) Left 2024    MRI BREAST BIOPSY RIGHT (ALL INCLUSIVE) Right 2024    US GUIDED BREAST BIOPSY RIGHT COMPLETE Right 2024    US GUIDED BREAST LYMPH NODE BIOPSY RIGHT Right 2024              Current Outpatient Medications   Medication Instructions    methylPREDNISolone 4 MG tablet therapy pack Use as directed on package         Social History          Social History Narrative     Lives with  and kids      Work for [employer]       Tobacco Use History   Social History           Tobacco Use   Smoking Status Never    Passive exposure: Never   Smokeless Tobacco Never

## 2024-09-11 NOTE — ASSESSMENT & PLAN NOTE
SEEN FOR SO   SEEN FOR BEST   ADMITS TO BEING IN [facility] TODAY   METS 9   DENIES CP AND DENIES SOB   At risk for post-op IDALIA - NO   At risk for post-op SSI - NO   PATS REVIEWED AS STABLE   BEST   Breathing- instructed to exercise lungs prior to surgery via DEEP BREATHING   Eat- discussed increasing protein intake prior to surgery   Sleep/stress- encouraged 8-10 sleep @ night, stress reduction, avoid sick contacts and handwashing   Train- encouraged to remain active

## 2024-09-17 ENCOUNTER — TELEPHONE (OUTPATIENT)
Dept: ANESTHESIOLOGY | Facility: CLINIC | Age: 49
End: 2024-09-17

## 2024-09-17 ENCOUNTER — APPOINTMENT (OUTPATIENT)
Dept: LAB | Facility: HOSPITAL | Age: 49
End: 2024-09-17
Payer: COMMERCIAL

## 2024-09-17 DIAGNOSIS — Z01.818 PRE-OP EXAMINATION: ICD-10-CM

## 2024-09-17 DIAGNOSIS — N60.92 ATYPICAL LOBULAR HYPERPLASIA (ALH) OF LEFT BREAST: ICD-10-CM

## 2024-09-17 DIAGNOSIS — C50.911 CARCINOMA OF RIGHT BREAST METASTATIC TO AXILLARY LYMPH NODE (HCC): ICD-10-CM

## 2024-09-17 DIAGNOSIS — C50.511 MALIGNANT NEOPLASM OF LOWER-OUTER QUADRANT OF RIGHT BREAST OF FEMALE, ESTROGEN RECEPTOR POSITIVE (HCC): ICD-10-CM

## 2024-09-17 DIAGNOSIS — Z17.0 MALIGNANT NEOPLASM OF LOWER-OUTER QUADRANT OF RIGHT BREAST OF FEMALE, ESTROGEN RECEPTOR POSITIVE (HCC): ICD-10-CM

## 2024-09-17 DIAGNOSIS — C77.3 CARCINOMA OF RIGHT BREAST METASTATIC TO AXILLARY LYMPH NODE (HCC): ICD-10-CM

## 2024-09-17 RX ORDER — LORATADINE 10 MG/1
10 TABLET ORAL AS NEEDED
COMMUNITY

## 2024-09-17 NOTE — PRE-PROCEDURE INSTRUCTIONS
Pre-Surgery Instructions:   Medication Instructions    loratadine (CLARITIN) 10 mg tablet Uses PRN- DO NOT take day of surgery      Medication instructions for day surgery reviewed. Please use only a sip of water to take your instructed medications. Avoid all over the counter vitamins, supplements and NSAIDS for one week prior to surgery per anesthesia guidelines. Tylenol is ok to take as needed.     You will receive a call one business day prior to surgery with an arrival time and hospital directions. If your surgery is scheduled on a Monday, the hospital will be calling you on the Friday prior to your surgery. If you have not heard from anyone by 8pm, please call the hospital supervisor through the hospital  at [phone]. ([place] [phone] or [place] [phone]).    Do not eat or drink anything after midnight the night before your surgery, including candy, mints, lifesavers, or chewing gum. Do not drink alcohol 24hrs before your surgery. Try not to smoke at least 24hrs before your surgery.       Follow the pre surgery showering instructions as listed in the “My Surgical Experience Booklet” or otherwise provided by your surgeon's office. Do not use a blade to shave the surgical area 1 week before surgery. It is okay to use a clean electric clippers up to 24 hours before surgery. Do not apply any lotions, creams, including makeup, cologne, deodorant, or perfumes after showering on the day of your surgery. Do not use dry shampoo, hair spray, hair gel, or any type of hair products.     No contact lenses, eye make-up, or artificial eyelashes. Remove nail polish, including gel polish, and any artificial, gel, or acrylic nails if possible. Remove all jewelry including rings and body piercing jewelry.     Wear causal clothing that is easy to take on and off. Consider your type of surgery.    Keep any valuables, jewelry, piercings at home. Please bring any specially ordered equipment (sling, braces) if  indicated.    Arrange for a responsible person to drive you to and from the hospital on the day of your surgery. Please confirm the visitor policy for the day of your procedure when you receive your phone call with an arrival time.     Call the surgeon's office with any new illnesses, exposures, or additional questions prior to surgery.    Please reference your “My Surgical Experience Booklet” for additional information to prepare for your upcoming surgery.

## 2024-09-18 ENCOUNTER — NURSE TRIAGE (OUTPATIENT)
Age: 49
End: 2024-09-18

## 2024-09-18 ENCOUNTER — ANESTHESIA EVENT (OUTPATIENT)
Dept: PERIOP | Facility: HOSPITAL | Age: 49
End: 2024-09-18
Payer: COMMERCIAL

## 2024-09-18 NOTE — TELEPHONE ENCOUNTER
Pt calling in re surgery tomorrow, noted she received paperwork from Dr. Brink office re picking up post op meds ahead of time but noted nothing sent. Reviewed medications typically sent for p/u afterwards, pt ok with this, no other questions at this time.

## 2024-09-19 ENCOUNTER — ANESTHESIA (OUTPATIENT)
Dept: PERIOP | Facility: HOSPITAL | Age: 49
End: 2024-09-19
Payer: COMMERCIAL

## 2024-09-19 ENCOUNTER — APPOINTMENT (OUTPATIENT)
Dept: RADIOLOGY | Facility: HOSPITAL | Age: 49
End: 2024-09-19
Payer: COMMERCIAL

## 2024-09-19 ENCOUNTER — HOSPITAL ENCOUNTER (OUTPATIENT)
Facility: HOSPITAL | Age: 49
Setting detail: OUTPATIENT SURGERY
Discharge: HOME | End: 2024-09-20
Attending: SURGERY | Admitting: PLASTIC SURGERY
Payer: COMMERCIAL

## 2024-09-19 DIAGNOSIS — Z98.890 STATUS POST BILATERAL BREAST RECONSTRUCTION: Primary | ICD-10-CM

## 2024-09-19 DIAGNOSIS — N60.92 ATYPICAL LOBULAR HYPERPLASIA (ALH) OF LEFT BREAST: ICD-10-CM

## 2024-09-19 DIAGNOSIS — Z17.0 MALIGNANT NEOPLASM OF LOWER-OUTER QUADRANT OF RIGHT BREAST OF FEMALE, ESTROGEN RECEPTOR POSITIVE (HCC): ICD-10-CM

## 2024-09-19 DIAGNOSIS — C77.3 CARCINOMA OF RIGHT BREAST METASTATIC TO AXILLARY LYMPH NODE (HCC): ICD-10-CM

## 2024-09-19 DIAGNOSIS — C50.911 CARCINOMA OF RIGHT BREAST METASTATIC TO AXILLARY LYMPH NODE (HCC): ICD-10-CM

## 2024-09-19 DIAGNOSIS — C50.511 MALIGNANT NEOPLASM OF LOWER-OUTER QUADRANT OF RIGHT BREAST OF FEMALE, ESTROGEN RECEPTOR POSITIVE (HCC): ICD-10-CM

## 2024-09-19 LAB
EXT PREGNANCY TEST URINE: NEGATIVE
EXT. CONTROL: NORMAL

## 2024-09-19 PROCEDURE — 88305 TISSUE EXAM BY PATHOLOGIST: CPT | Performed by: PATHOLOGY

## 2024-09-19 PROCEDURE — 19357 TISS XPNDR PLMT BRST RCNSTJ: CPT | Performed by: PHYSICIAN ASSISTANT

## 2024-09-19 PROCEDURE — 19307 MAST MOD RAD: CPT | Performed by: SURGERY

## 2024-09-19 PROCEDURE — C1789 PROSTHESIS, BREAST, IMP: HCPCS | Performed by: SURGERY

## 2024-09-19 PROCEDURE — 15777 ACELLULAR DERM MATRIX IMPLT: CPT | Performed by: PHYSICIAN ASSISTANT

## 2024-09-19 PROCEDURE — 19303 MAST SIMPLE COMPLETE: CPT | Performed by: CLINICAL NURSE SPECIALIST

## 2024-09-19 PROCEDURE — 81025 URINE PREGNANCY TEST: CPT | Performed by: SURGERY

## 2024-09-19 PROCEDURE — 19357 TISS XPNDR PLMT BRST RCNSTJ: CPT | Performed by: PLASTIC SURGERY

## 2024-09-19 PROCEDURE — 88307 TISSUE EXAM BY PATHOLOGIST: CPT | Performed by: PATHOLOGY

## 2024-09-19 PROCEDURE — C9290 INJ, BUPIVACAINE LIPOSOME: HCPCS | Performed by: PHYSICIAN ASSISTANT

## 2024-09-19 PROCEDURE — 15777 ACELLULAR DERM MATRIX IMPLT: CPT | Performed by: PLASTIC SURGERY

## 2024-09-19 PROCEDURE — 19303 MAST SIMPLE COMPLETE: CPT | Performed by: SURGERY

## 2024-09-19 PROCEDURE — NC001 PR NO CHARGE: Performed by: PLASTIC SURGERY

## 2024-09-19 PROCEDURE — 14301 TIS TRNFR ANY 30.1-60 SQ CM: CPT | Performed by: PLASTIC SURGERY

## 2024-09-19 PROCEDURE — 14301 TIS TRNFR ANY 30.1-60 SQ CM: CPT | Performed by: PHYSICIAN ASSISTANT

## 2024-09-19 PROCEDURE — C1781 MESH (IMPLANTABLE): HCPCS | Performed by: SURGERY

## 2024-09-19 PROCEDURE — 14302 TIS TRNFR ADDL 30 SQ CM: CPT | Performed by: PHYSICIAN ASSISTANT

## 2024-09-19 PROCEDURE — 88342 IMHCHEM/IMCYTCHM 1ST ANTB: CPT | Performed by: PATHOLOGY

## 2024-09-19 PROCEDURE — 14302 TIS TRNFR ADDL 30 SQ CM: CPT | Performed by: PLASTIC SURGERY

## 2024-09-19 PROCEDURE — 88341 IMHCHEM/IMCYTCHM EA ADD ANTB: CPT | Performed by: PATHOLOGY

## 2024-09-19 PROCEDURE — 19307 MAST MOD RAD: CPT | Performed by: CLINICAL NURSE SPECIALIST

## 2024-09-19 DEVICE — IMPLANTABLE DEVICE: Type: IMPLANTABLE DEVICE | Site: BREAST | Status: FUNCTIONAL

## 2024-09-19 DEVICE — TEXTURED, HIGH PROFILE, SUTURE TABS, INTEGRAL INJECTION DOME, 750CC
Type: IMPLANTABLE DEVICE | Site: BREAST | Status: FUNCTIONAL
Brand: ARTOURA BREAST TISSUE EXPANDER

## 2024-09-19 RX ORDER — FENTANYL CITRATE 50 UG/ML
INJECTION, SOLUTION INTRAMUSCULAR; INTRAVENOUS AS NEEDED
Status: DISCONTINUED | OUTPATIENT
Start: 2024-09-19 | End: 2024-09-19

## 2024-09-19 RX ORDER — PHENYLEPHRINE HCL IN 0.9% NACL 1 MG/10 ML
SYRINGE (ML) INTRAVENOUS AS NEEDED
Status: DISCONTINUED | OUTPATIENT
Start: 2024-09-19 | End: 2024-09-19

## 2024-09-19 RX ORDER — SODIUM CHLORIDE, SODIUM LACTATE, POTASSIUM CHLORIDE, CALCIUM CHLORIDE 600; 310; 30; 20 MG/100ML; MG/100ML; MG/100ML; MG/100ML
75 INJECTION, SOLUTION INTRAVENOUS CONTINUOUS
Status: DISCONTINUED | OUTPATIENT
Start: 2024-09-19 | End: 2024-09-20 | Stop reason: HOSPADM

## 2024-09-19 RX ORDER — ACETAMINOPHEN 325 MG/1
975 TABLET ORAL EVERY 6 HOURS SCHEDULED
Status: DISCONTINUED | OUTPATIENT
Start: 2024-09-19 | End: 2024-09-20 | Stop reason: HOSPADM

## 2024-09-19 RX ORDER — CYCLOBENZAPRINE HCL 10 MG
10 TABLET ORAL 3 TIMES DAILY
Status: DISCONTINUED | OUTPATIENT
Start: 2024-09-19 | End: 2024-09-20 | Stop reason: HOSPADM

## 2024-09-19 RX ORDER — HEPARIN SODIUM 5000 [USP'U]/ML
5000 INJECTION, SOLUTION INTRAVENOUS; SUBCUTANEOUS EVERY 8 HOURS SCHEDULED
Status: DISCONTINUED | OUTPATIENT
Start: 2024-09-19 | End: 2024-09-20 | Stop reason: HOSPADM

## 2024-09-19 RX ORDER — OXYCODONE HYDROCHLORIDE 10 MG/1
10 TABLET ORAL EVERY 4 HOURS PRN
Status: DISCONTINUED | OUTPATIENT
Start: 2024-09-19 | End: 2024-09-20 | Stop reason: HOSPADM

## 2024-09-19 RX ORDER — HYDROMORPHONE HCL/PF 1 MG/ML
0.5 SYRINGE (ML) INJECTION EVERY 4 HOURS PRN
Status: DISCONTINUED | OUTPATIENT
Start: 2024-09-19 | End: 2024-09-20 | Stop reason: HOSPADM

## 2024-09-19 RX ORDER — DEXAMETHASONE SODIUM PHOSPHATE 10 MG/ML
INJECTION, SOLUTION INTRAMUSCULAR; INTRAVENOUS AS NEEDED
Status: DISCONTINUED | OUTPATIENT
Start: 2024-09-19 | End: 2024-09-19

## 2024-09-19 RX ORDER — GABAPENTIN 300 MG/1
300 CAPSULE ORAL 3 TIMES DAILY
Status: DISCONTINUED | OUTPATIENT
Start: 2024-09-19 | End: 2024-09-20 | Stop reason: HOSPADM

## 2024-09-19 RX ORDER — CEFAZOLIN SODIUM 1 G/50ML
1000 SOLUTION INTRAVENOUS EVERY 8 HOURS
Status: DISCONTINUED | OUTPATIENT
Start: 2024-09-19 | End: 2024-09-20 | Stop reason: HOSPADM

## 2024-09-19 RX ORDER — LIDOCAINE HYDROCHLORIDE 10 MG/ML
INJECTION, SOLUTION EPIDURAL; INFILTRATION; INTRACAUDAL; PERINEURAL AS NEEDED
Status: DISCONTINUED | OUTPATIENT
Start: 2024-09-19 | End: 2024-09-19

## 2024-09-19 RX ORDER — TRANEXAMIC ACID 100 MG/ML
INJECTION, SOLUTION INTRAVENOUS AS NEEDED
Status: DISCONTINUED | OUTPATIENT
Start: 2024-09-19 | End: 2024-09-19

## 2024-09-19 RX ORDER — PROPOFOL 10 MG/ML
INJECTION, EMULSION INTRAVENOUS AS NEEDED
Status: DISCONTINUED | OUTPATIENT
Start: 2024-09-19 | End: 2024-09-19

## 2024-09-19 RX ORDER — DIPHENHYDRAMINE HYDROCHLORIDE 50 MG/ML
25 INJECTION INTRAMUSCULAR; INTRAVENOUS EVERY 6 HOURS PRN
Status: DISCONTINUED | OUTPATIENT
Start: 2024-09-19 | End: 2024-09-20 | Stop reason: HOSPADM

## 2024-09-19 RX ORDER — ONDANSETRON 2 MG/ML
4 INJECTION INTRAMUSCULAR; INTRAVENOUS EVERY 6 HOURS PRN
Status: DISCONTINUED | OUTPATIENT
Start: 2024-09-19 | End: 2024-09-20 | Stop reason: HOSPADM

## 2024-09-19 RX ORDER — PROMETHAZINE HYDROCHLORIDE 25 MG/ML
25 INJECTION, SOLUTION INTRAMUSCULAR; INTRAVENOUS ONCE AS NEEDED
Status: DISCONTINUED | OUTPATIENT
Start: 2024-09-19 | End: 2024-09-19 | Stop reason: HOSPADM

## 2024-09-19 RX ORDER — ONDANSETRON 2 MG/ML
4 INJECTION INTRAMUSCULAR; INTRAVENOUS ONCE AS NEEDED
Status: DISCONTINUED | OUTPATIENT
Start: 2024-09-19 | End: 2024-09-19 | Stop reason: HOSPADM

## 2024-09-19 RX ORDER — ONDANSETRON 2 MG/ML
INJECTION INTRAMUSCULAR; INTRAVENOUS AS NEEDED
Status: DISCONTINUED | OUTPATIENT
Start: 2024-09-19 | End: 2024-09-19

## 2024-09-19 RX ORDER — MIDAZOLAM HYDROCHLORIDE 2 MG/2ML
INJECTION, SOLUTION INTRAMUSCULAR; INTRAVENOUS AS NEEDED
Status: DISCONTINUED | OUTPATIENT
Start: 2024-09-19 | End: 2024-09-19

## 2024-09-19 RX ORDER — FENTANYL CITRATE/PF 50 MCG/ML
50 SYRINGE (ML) INJECTION
Status: DISCONTINUED | OUTPATIENT
Start: 2024-09-19 | End: 2024-09-19 | Stop reason: HOSPADM

## 2024-09-19 RX ORDER — HEPARIN SODIUM 5000 [USP'U]/ML
5000 INJECTION, SOLUTION INTRAVENOUS; SUBCUTANEOUS EVERY 8 HOURS SCHEDULED
Status: DISCONTINUED | OUTPATIENT
Start: 2024-09-19 | End: 2024-09-19

## 2024-09-19 RX ORDER — SODIUM CHLORIDE 9 MG/ML
50 INJECTION, SOLUTION INTRAVENOUS CONTINUOUS
Status: DISCONTINUED | OUTPATIENT
Start: 2024-09-19 | End: 2024-09-20 | Stop reason: HOSPADM

## 2024-09-19 RX ORDER — CEFAZOLIN SODIUM 1 G/50ML
1000 SOLUTION INTRAVENOUS ONCE
Status: COMPLETED | OUTPATIENT
Start: 2024-09-19 | End: 2024-09-19

## 2024-09-19 RX ORDER — HYDROMORPHONE HYDROCHLORIDE 1 MG/ML
INJECTION, SOLUTION INTRAMUSCULAR; INTRAVENOUS; SUBCUTANEOUS AS NEEDED
Status: DISCONTINUED | OUTPATIENT
Start: 2024-09-19 | End: 2024-09-19

## 2024-09-19 RX ORDER — ROCURONIUM BROMIDE 10 MG/ML
INJECTION, SOLUTION INTRAVENOUS AS NEEDED
Status: DISCONTINUED | OUTPATIENT
Start: 2024-09-19 | End: 2024-09-19

## 2024-09-19 RX ORDER — HEPARIN SODIUM 5000 [USP'U]/ML
INJECTION, SOLUTION INTRAVENOUS; SUBCUTANEOUS AS NEEDED
Status: DISCONTINUED | OUTPATIENT
Start: 2024-09-19 | End: 2024-09-19

## 2024-09-19 RX ORDER — HYDROMORPHONE HCL/PF 1 MG/ML
0.5 SYRINGE (ML) INJECTION
Status: DISCONTINUED | OUTPATIENT
Start: 2024-09-19 | End: 2024-09-19 | Stop reason: HOSPADM

## 2024-09-19 RX ORDER — DOCUSATE SODIUM 100 MG/1
100 CAPSULE, LIQUID FILLED ORAL 2 TIMES DAILY
Status: DISCONTINUED | OUTPATIENT
Start: 2024-09-19 | End: 2024-09-20 | Stop reason: HOSPADM

## 2024-09-19 RX ORDER — SUCCINYLCHOLINE/SOD CL,ISO/PF 100 MG/5ML
SYRINGE (ML) INTRAVENOUS AS NEEDED
Status: DISCONTINUED | OUTPATIENT
Start: 2024-09-19 | End: 2024-09-19

## 2024-09-19 RX ORDER — OXYCODONE HYDROCHLORIDE 5 MG/1
5 TABLET ORAL EVERY 4 HOURS PRN
Status: DISCONTINUED | OUTPATIENT
Start: 2024-09-19 | End: 2024-09-20 | Stop reason: HOSPADM

## 2024-09-19 RX ORDER — MAGNESIUM HYDROXIDE 1200 MG/15ML
LIQUID ORAL AS NEEDED
Status: DISCONTINUED | OUTPATIENT
Start: 2024-09-19 | End: 2024-09-19 | Stop reason: HOSPADM

## 2024-09-19 RX ADMIN — DOCUSATE SODIUM 100 MG: 100 CAPSULE, LIQUID FILLED ORAL at 21:03

## 2024-09-19 RX ADMIN — DEXAMETHASONE SODIUM PHOSPHATE 10 MG: 10 INJECTION, SOLUTION INTRAMUSCULAR; INTRAVENOUS at 08:06

## 2024-09-19 RX ADMIN — LIDOCAINE HYDROCHLORIDE 50 MG: 10 INJECTION, SOLUTION EPIDURAL; INFILTRATION; INTRACAUDAL at 08:06

## 2024-09-19 RX ADMIN — HYDROMORPHONE HYDROCHLORIDE 0.5 MG: 1 INJECTION, SOLUTION INTRAMUSCULAR; INTRAVENOUS; SUBCUTANEOUS at 08:15

## 2024-09-19 RX ADMIN — SODIUM CHLORIDE 50 ML/HR: 0.9 INJECTION, SOLUTION INTRAVENOUS at 13:40

## 2024-09-19 RX ADMIN — ROCURONIUM BROMIDE 10 MG: 10 INJECTION, SOLUTION INTRAVENOUS at 09:54

## 2024-09-19 RX ADMIN — TRANEXAMIC ACID 1 G: 1 INJECTION, SOLUTION INTRAVENOUS at 10:08

## 2024-09-19 RX ADMIN — ACETAMINOPHEN 975 MG: 325 TABLET, FILM COATED ORAL at 13:50

## 2024-09-19 RX ADMIN — HYDROMORPHONE HYDROCHLORIDE 0.5 MG: 1 INJECTION, SOLUTION INTRAMUSCULAR; INTRAVENOUS; SUBCUTANEOUS at 09:25

## 2024-09-19 RX ADMIN — HYDROMORPHONE HYDROCHLORIDE 0.5 MG: 1 INJECTION, SOLUTION INTRAMUSCULAR; INTRAVENOUS; SUBCUTANEOUS at 08:30

## 2024-09-19 RX ADMIN — PROPOFOL 20 MG: 10 INJECTION, EMULSION INTRAVENOUS at 08:53

## 2024-09-19 RX ADMIN — ROCURONIUM BROMIDE 30 MG: 10 INJECTION, SOLUTION INTRAVENOUS at 09:08

## 2024-09-19 RX ADMIN — SUGAMMADEX 200 MG: 100 INJECTION, SOLUTION INTRAVENOUS at 11:20

## 2024-09-19 RX ADMIN — OXYCODONE HYDROCHLORIDE 5 MG: 5 TABLET ORAL at 15:35

## 2024-09-19 RX ADMIN — CYCLOBENZAPRINE HYDROCHLORIDE 10 MG: 10 TABLET, FILM COATED ORAL at 13:50

## 2024-09-19 RX ADMIN — MIDAZOLAM 2 MG: 1 INJECTION INTRAMUSCULAR; INTRAVENOUS at 07:55

## 2024-09-19 RX ADMIN — CEFAZOLIN SODIUM 1000 MG: 1 SOLUTION INTRAVENOUS at 15:35

## 2024-09-19 RX ADMIN — CYCLOBENZAPRINE HYDROCHLORIDE 10 MG: 10 TABLET, FILM COATED ORAL at 21:03

## 2024-09-19 RX ADMIN — PROPOFOL 30 MG: 10 INJECTION, EMULSION INTRAVENOUS at 09:21

## 2024-09-19 RX ADMIN — FENTANYL CITRATE 50 MCG: 50 INJECTION INTRAMUSCULAR; INTRAVENOUS at 12:26

## 2024-09-19 RX ADMIN — FENTANYL CITRATE 100 MCG: 50 INJECTION INTRAMUSCULAR; INTRAVENOUS at 08:04

## 2024-09-19 RX ADMIN — PROPOFOL 20 MG: 10 INJECTION, EMULSION INTRAVENOUS at 11:24

## 2024-09-19 RX ADMIN — SODIUM CHLORIDE, SODIUM LACTATE, POTASSIUM CHLORIDE, AND CALCIUM CHLORIDE: .6; .31; .03; .02 INJECTION, SOLUTION INTRAVENOUS at 11:05

## 2024-09-19 RX ADMIN — PROPOFOL 50 MG: 10 INJECTION, EMULSION INTRAVENOUS at 08:16

## 2024-09-19 RX ADMIN — Medication 80 MG: at 08:07

## 2024-09-19 RX ADMIN — CEFAZOLIN SODIUM 1000 MG: 1 SOLUTION INTRAVENOUS at 08:18

## 2024-09-19 RX ADMIN — HEPARIN SODIUM 5000 UNITS: 5000 INJECTION INTRAVENOUS; SUBCUTANEOUS at 10:04

## 2024-09-19 RX ADMIN — GABAPENTIN 300 MG: 300 CAPSULE ORAL at 21:03

## 2024-09-19 RX ADMIN — Medication 100 MCG: at 09:15

## 2024-09-19 RX ADMIN — DOCUSATE SODIUM 100 MG: 100 CAPSULE, LIQUID FILLED ORAL at 13:50

## 2024-09-19 RX ADMIN — ONDANSETRON 4 MG: 2 INJECTION INTRAMUSCULAR; INTRAVENOUS at 11:00

## 2024-09-19 RX ADMIN — PROPOFOL 20 MG: 10 INJECTION, EMULSION INTRAVENOUS at 09:24

## 2024-09-19 RX ADMIN — SODIUM CHLORIDE, SODIUM LACTATE, POTASSIUM CHLORIDE, AND CALCIUM CHLORIDE 75 ML/HR: .6; .31; .03; .02 INJECTION, SOLUTION INTRAVENOUS at 07:15

## 2024-09-19 RX ADMIN — ACETAMINOPHEN 975 MG: 325 TABLET, FILM COATED ORAL at 21:03

## 2024-09-19 RX ADMIN — PROPOFOL 150 MG: 10 INJECTION, EMULSION INTRAVENOUS at 08:06

## 2024-09-19 RX ADMIN — ROCURONIUM BROMIDE 10 MG: 10 INJECTION, SOLUTION INTRAVENOUS at 10:22

## 2024-09-19 RX ADMIN — GABAPENTIN 300 MG: 300 CAPSULE ORAL at 13:49

## 2024-09-19 RX ADMIN — HEPARIN SODIUM 5000 UNITS: 5000 INJECTION INTRAVENOUS; SUBCUTANEOUS at 18:06

## 2024-09-19 RX ADMIN — PROPOFOL 80 MG: 10 INJECTION, EMULSION INTRAVENOUS at 08:49

## 2024-09-19 NOTE — PROGRESS NOTES
Progress Note - Plastic Surgery   Name: Stacey Villatoro 49 y.o. female I MRN: [id number]  Unit/Bed#: OR [facility] I Date of Admission: 9/19/2024   Date of Service: 9/19/2024 I Hospital Day: 0     Assessment & Plan      Patient marked in pre-operative holding area.  Reviewed markings with patient and the planned excision/reconstruction.  All her questions were answered to her satisfaction, will proceed to OR as scheduled.

## 2024-09-19 NOTE — INTERVAL H&P NOTE
H&P reviewed. After examining the patient I find no changes in the patients condition since the H&P had been written.    Vitals:    09/19/24 0657   BP: 155/87   Pulse: 100   Resp: 18   Temp: 97.9 °F (36.6 °C)   SpO2: 100%

## 2024-09-19 NOTE — H&P
Assessment & Plan  48 yo female for first stage breast reconstruction with large tumor of the right breast, for bilateral mastectomy  1.) Patient would be an excellent candidate for either autologous or implant-based reconstruction although the patient is leaning towards implant-based reconstruction  2.)  I discussed with her that regardless of her final decision the process would start with tissue expander placement at time of mastectomy  3.)  All her questions were answered to her satisfaction, informed consent obtained and signed.  Will proceed to OR as scheduled.        Discussion- Discussion--discussed with patient her options for reconstruction, discussed with the patient the options for autologous versus implant based reconstruction.  I discussed with her that breast reconstruction is typically performed in stages with the 1st step being placement of a tissue expander at the time of mastectomy.  I discussed with her the risks, benefits, alternatives of tissue expander placement including the risk of bleeding, infection, scarring, poor wound healing, demonstrating underlying structures, need for further surgery, need for multiple procedures, mastectomy flap necrosis, partial mastectomy flap necrosis, the off-label use of acellular dermal matrix, the risk of breast implant associated anaplastic large cell lymphoma, the need for the expansion process, the risk of seroma, the risk of DVT, risk of asymmetry, the need for multiple procedures, the need for revision, poor aesthetic result, asymmetry.  I discussed with her the use of drains, the use of lori 90 minutes negative pressure wound therapy dressings, the expected recovery time, the expected hospital stay, I discussed with her the reconstructive expansion process.  I discussed with her that at the completion of expansion we would need at least 3 months after the completion of expansion no post mastectomy treatment is needed.  I discussed with her the need for  chemotherapy radiation with push that time when back.  I discussed with her the next step for reconstruction options include the use of implant based reconstruction, I discussed with her the risks, benefits, alternatives exchange procedure, the implants do not typically fair as well with radiation, I discussed with her the use of silicone implants, the risk of leak, the clinical significance of implant leak, I discussed with her the screening for MRI recommendations.  I discussed with her the option of autologous based reconstruction.  I discussed with her the nature of tram flap surgery versus PERRY flap surgery, and discussion of complex nature of micro surgery, the risks, benefits and alternate is of abdominally based autologous reconstruction including the above with the additional risk of micro surgical failure, flap failure, need for ICU stay, need for multiple procedures, need for revision, we discussed the option of nipple reconstruction.  All the patient's questions were answered to her satisfaction.     The patient has decided that she would like to proceed with planning for reconstruction, I discussed with her the 1st step is for expander placement at the time of mastectomy.  We discussed the risks, benefits, alternatives of expander placement including the use of acellular dermal matrix, need for expansion, the risk of bleeding, infection, scarring, poor wound healing, damage surrounding and underlying structures, need for further surgery, need for multiple procedures, risk of DVT, seroma, the use of drains, use of lori negative pressure wound therapy dressing, asymmetry, need for revision, need for multiple procedures, need for the exchange for 2nd stage reconstruction, the need for the expansion process,, the risk of implant failure, the risk of expander failure, the possibility for implant infection, the need for exchange, mastectomy flap necrosis, partial mastectomy flap necrosis, we discussed the use  of drains, the expected hospital stay, the expected recovery time, all the patient's questions are answered to her satisfaction, informed consent obtained and signed, we will proceed to the OR as scheduled.     Counseling dominated visit, total counseling time 45 minutes, total visit time 60 minutes including documentation, review of her chart and coordination of care.              Subjective  Patient ID: Stacey Villatoro is a 49 y.o. female.         Vitals:     08/30/24 1057   BP: 152/93   Pulse: 77   Temp: 98.1 °F (36.7 °C)   SpO2: 98%      HPI     49-year-old female who is here today to discuss breast reconstruction options.  The patient has been diagnosed with a large right-sided breast cancer, she is here today to discuss possible reconstructive options.  The patient had been previously up-to-date with her mammograms, she does not have any chronic medical conditions, she does not have a strong family history of breast cancer, she is a non-smoker, she does not take steroids or blood thinners.     Review of Systems   All other systems reviewed and are negative.              Objective  Physical Exam   Constitutional  She appears well-developed and well-nourished.      Eyes  Pupils are equal, round, and reactive to light. System normal.      General -             Right: Right eye extraocular movements are normal.             Left: Left eye extraocular movements are normal.         Skin     No skin changes apparent      Psychiatric  She has a normal mood and affect. Her behavior is normal. Judgment and thought content normal.            Bilateral breasts- grade III ptosis, palpable mass of right breast, no palpable masses left breast  Measurements- R- SN 30 NIMF 10 BW 16                             L- SN 32 NIMF15 BW 15 NN 16       Medical History        Past Medical History:   Diagnosis Date    Arthritis      BRCA1 negative      BRCA2 negative      Breast cancer (HCC)      Breast mass      Cancer (HCC)            Surgical History         Past Surgical History:   Procedure Laterality Date    BREAST BIOPSY Right 2024     SECTION         x2    FOOT SURGERY        MRI BREAST BIOPSY LEFT (ALL INCLUSIVE) Left 2024    MRI BREAST BIOPSY RIGHT (ALL INCLUSIVE) Right 2024    US GUIDED BREAST BIOPSY RIGHT COMPLETE Right 2024    US GUIDED BREAST LYMPH NODE BIOPSY RIGHT Right 2024              Current Outpatient Medications   Medication Instructions    methylPREDNISolone 4 MG tablet therapy pack Use as directed on package         Social History          Social History Narrative     Lives with  and kids      Work for [employer]       Tobacco Use History   Social History           Tobacco Use   Smoking Status Never    Passive exposure: Never   Smokeless Tobacco Never

## 2024-09-19 NOTE — PLAN OF CARE
Problem: PAIN - ADULT  Goal: Verbalizes/displays adequate comfort level or baseline comfort level  Description: Interventions:  - Encourage patient to monitor pain and request assistance  - Assess pain using appropriate pain scale  - Administer analgesics based on type and severity of pain and evaluate response  - Implement non-pharmacological measures as appropriate and evaluate response  - Consider cultural and social influences on pain and pain management  - Notify physician/advanced practitioner if interventions unsuccessful or patient reports new pain  Outcome: Progressing     Problem: INFECTION - ADULT  Goal: Absence or prevention of progression during hospitalization  Description: INTERVENTIONS:  - Assess and monitor for signs and symptoms of infection  - Monitor lab/diagnostic results  - Monitor all insertion sites, i.e. indwelling lines, tubes, and drains  - Monitor endotracheal if appropriate and nasal secretions for changes in amount and color  - Malcolm appropriate cooling/warming therapies per order  - Administer medications as ordered  - Instruct and encourage patient and family to use good hand hygiene technique  - Identify and instruct in appropriate isolation precautions for identified infection/condition  Outcome: Progressing  Goal: Absence of fever/infection during neutropenic period  Description: INTERVENTIONS:  - Monitor WBC    Outcome: Progressing     Problem: SAFETY ADULT  Goal: Patient will remain free of falls  Description: INTERVENTIONS:  - Educate patient/family on patient safety including physical limitations  - Instruct patient to call for assistance with activity   - Consult OT/PT to assist with strengthening/mobility   - Keep Call bell within reach  - Keep bed low and locked with side rails adjusted as appropriate  - Keep care items and personal belongings within reach  - Initiate and maintain comfort rounds  - Make Fall Risk Sign visible to staff  - Offer Toileting every 2 Hours,  in advance of need  - Initiate/Maintain bed alarm  - Obtain necessary fall risk management equipment: call bell within reach  - Apply yellow socks and bracelet for high fall risk patients  - Consider moving patient to room near nurses station  Outcome: Progressing  Goal: Maintain or return to baseline ADL function  Description: INTERVENTIONS:  -  Assess patient's ability to carry out ADLs; assess patient's baseline for ADL function and identify physical deficits which impact ability to perform ADLs (bathing, care of mouth/teeth, toileting, grooming, dressing, etc.)  - Assess/evaluate cause of self-care deficits   - Assess range of motion  - Assess patient's mobility; develop plan if impaired  - Assess patient's need for assistive devices and provide as appropriate  - Encourage maximum independence but intervene and supervise when necessary  - Involve family in performance of ADLs  - Assess for home care needs following discharge   - Consider OT consult to assist with ADL evaluation and planning for discharge  - Provide patient education as appropriate  Outcome: Progressing  Goal: Maintains/Returns to pre admission functional level  Description: INTERVENTIONS:  - Perform AM-PAC 6 Click Basic Mobility/ Daily Activity assessment daily.  - Set and communicate daily mobility goal to care team and patient/family/caregiver.   - Collaborate with rehabilitation services on mobility goals if consulted  - Perform Range of Motion 3 times a day.  - Reposition patient every 2 hours.  - Dangle patient 3 times a day  - Stand patient 3 times a day  - Ambulate patient 3 times a day  - Out of bed to chair 3 times a day   - Out of bed for meals 3 times a day  - Out of bed for toileting  - Record patient progress and toleration of activity level   Outcome: Progressing

## 2024-09-19 NOTE — OP NOTE
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) [C50.911, C77.3]  Atypical lobular hyperplasia (ALH) of left breast [N60.92]  Bilateral mastectomy defect      Post-Op Diagnosis Codes:     * Malignant neoplasm of lower-outer quadrant of right breast of female, estrogen receptor positive (HCC) [C50.511, Z17.0]     * 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) [C50.911, C77.3]     * Atypical lobular hyperplasia (ALH) of left breast [N60.92]  Bilateral mastectomy defect      Procedure(s):  Bilateral first stage breast reconstruction and placement of submuscular [place] textured 750 mL Yoel tissue expanders, initial fill 100 mL, reinforcement of inferior pole with Flex HD acellular dermal matrix, bilateral pectoralis block with Exparel, right sided axillary advancement flap, 15 cm x 10 cm, placement of bilateral lori none DME negative pressure wound therapy dressings, less than 50 cm² each, total less than 50 cm².     Specimen(s):  ID Type Source Tests Collected by Time Destination   1 : RIGHT BREAST MASTECTOMY SUTURE HOLDEN AXILLARY TAIL Tissue Breast, Right TISSUE EXAM Mert Brink MD 2024  8:46 AM    2 : RIGHT AXILLARY LEVEL 1 AND LEVEL 2 LYMPH NODES Tissue Axillary TISSUE EXAM Mert Brink MD 2024  9:13 AM    3 : LEFT BREAST MASTECTOMY SUTURE MARKED AXILLARY TAIL Tissue Breast, Left TISSUE EXAM Mert Brink MD  9/19/2024  9:33 AM    4 : NEW RIGHT SUPERIOR MASTECTOMY MARGIN Tissue Breast, Right TISSUE EXAM Jonathan Archibald MD 9/19/2024 10:03 AM        Estimated Blood Loss:   75 mL    Drains:  Closed/Suction Drain Inferior;Right Breast Bulb 15 Fr. (Active)   Site Description Unable to view 09/19/24 1300   Dressing Status Clean;Dry;Intact 09/19/24 1300   Drainage Appearance Serosanguineous 09/19/24 1300   Status To bulb suction 09/19/24 1300   Output (mL) 15 mL 09/19/24 1245   Number of days: 0       Closed/Suction Drain Right Breast Bulb 15 Fr. (Active)   Site Description Unable to view 09/19/24 1300   Dressing Status Clean;Intact;Dry 09/19/24 1300   Drainage Appearance Serosanguineous 09/19/24 1300   Status To bulb suction 09/19/24 1300   Output (mL) 20 mL 09/19/24 1245   Number of days: 0       Closed/Suction Drain Lateral;Left Breast Bulb 15 Fr. (Active)   Site Description Unable to view 09/19/24 1300   Dressing Status Clean;Dry;Intact 09/19/24 1300   Drainage Appearance Serosanguineous 09/19/24 1300   Status To bulb suction 09/19/24 1300   Output (mL) 5 mL 09/19/24 1245   Number of days: 0       Closed/Suction Drain Inferior;Left Breast Bulb 15 Fr. (Active)   Site Description Unable to view 09/19/24 1300   Dressing Status Clean;Dry;Intact 09/19/24 1300   Drainage Appearance Serosanguineous 09/19/24 1300   Status To bulb suction 09/19/24 1300   Output (mL) 30 mL 09/19/24 1245   Number of days: 0       Anesthesia Type:   General    Operative Indications:  Malignant neoplasm of lower-outer quadrant of right breast of female, estrogen receptor positive (HCC) [C50.511, Z17.0]  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) [C50.911, C77.3]  Atypical lobular hyperplasia (ALH) of left breast [N60.92]  As above    Operative Findings:  Extensive skin defect right breast      Complications:   None    Procedure and Technique:          Operative Findings:  Ms. Villatoro  is a 49 -year-old female with a history  of right -sided breast cancer who elected for bilateral mastectomy.  After lengthy discussion of her risks, benefits, and alternatives, the patient decided to undergo bilateral breast reconstruction.  We discussed the patient was a good candidate for either autologous or implant based reconstruction.  We discussed the 1st step of reconstruction was for placement bilateral tissue expanders, pending the results of pathology need for postmastectomy treatment, and final determination to be made type of breast reconstruction in the future.  We discussed the risks, benefits, alternatives including bleeding, infection, scarring, poor wound healing, demonstrating underlying structures, need for further surgery, need for multiple procedures, need for expansion process, seroma, DVT, asymmetry mastectomy flap necrosis, poor aesthetic result, need for revision, implant failure, implant infection, need for implant removal, all the patient's questions were answered to her satisfaction, informed consent obtained and signed and the patient was brought to the hospital as an outpatient elective basis to have her procedure performed.    The patient was identified in the preoperative holding area, her preoperative markings were performed and reviewed with the patient.  In the preoperative holding area she was given Lovenox, her SCDs were activated, and she was given preoperative antibiotics prior to induction of anesthesia.  She is brought to the OR where she was identified verbally, by site, and by armband in the operating room table prior to the induction of anesthesia.  After the induction of anesthesia she was prepped and draped in the standard surgical fashion, time-out was performed the surgical site identified.    Dr. Brink  first performed the bilateral mastectomies, please refer to his  dictation for full details.  Once the mastectomies were complete, the area was re-prepped and draped, and I commenced my portion of the  reconstructive portion of the procedure.  The procedure started on the right side, the pectoralis was 1st elevated off the chest wall and pectoralis minor from lateral to medial using a combination of blunt dissection and Bovie electrocautery in the retropectoral plane.  Bovie electrocautery was used to obtain meticulous hemostasis.  Once the pocket was formed, the pectoralis muscle was detached from its inferior attachments along the chest wall and inframammary fold.  Release was stopped approximately 1 0.5 cm from the midline, about the origin of the costochondral junction to decrease the risk of a symmastia.  Once this was performed, Exparel was injected along the neurovascular bundle the pectorals major muscle and intercostal space.  The wound was then copiously irrigated with multiple liters of Irrisept antibiotic irrigation containing Betadine.  Based on the patient's mastectomy weight which is approximately 950 g and her internal base width 16  cm, a large  Flex HD contour was chosen and a 750  mL mentor Yoel expander was chosen.  Once this was performed the ADM was sutured along the inframammary fold and mid axillary line with 0 stratafix in running fashion.  Once the ADM was sutured in the place, 0 Vicryl stay sutures were placed in the pectoralis muscle to the ADM.  Drains were then placed in the retropectoral pocket and the axillary space.  The wound was copiously irrigated triple antibiotic solution and Bovie electrocautery was used to obtain meticulous hemostasis.  In the exact same fashion the procedures performed on the left side.  Once both pockets were created, the area was re-prepped and draped again, my gloves were changed and under minimal touch technique the mentor Yoel tissue expanders placed in the retropectoral pocket.  Under direct visualization it was inflated with saline to 100  mL of saline due to the large nature of the skin defect.  The Vicryl as were then tied, and the wound  inspected, the wound was hemostatic, and had a very adequate retropectoral placement.  The expander was then placed in the exact same fashion on the right.  The patient received an infusion of TXA.  The wound edges were freshened as there was some questionable viability and excised sharply and sent for specimen.  Bovie electrocautery was used to obtain hemostasis of the skin flaps.  The wounds were then closed with a combination of 3-0 Monocryl in the superficial fascial system and deep dermal layer as well as 3-0 strata fix in the superficial fascial system and dermis.  The skin was then closed with 3-0 Monocryl and the drain sutured into place.  Because of the patient's questionable viability of skin flaps increased complication high consequence of wound healing complications, of lori a non DME negative pressure wound therapy dressing was applied.  The patient was woken from anesthesia in stable condition sent to PACU after all of her dressings were applied.  The patient tolerated the procedure well without complication, she will be admitted overnight for observation and discharged in the morning if stable.  Casey Booker, PAC assistance was necessary as no qualified resident was available, assistant was necessary for flap elevation, retraction, exposure given the patient's body habitus and wound closure.         I was present for the entire procedure.    Patient Disposition:  PACU              SIGNATURE: Jonathan Archibald MD  DATE: September 19, 2024  TIME: 6:14 PM                 Disposition:  PACU              SIGNATURE: Jonathan Archibald MD  DATE: September 19, 2024  TIME: 6:14 PM

## 2024-09-19 NOTE — ANESTHESIA POSTPROCEDURE EVALUATION
Post-Op Assessment Note    CV Status:  Stable  Pain Score: 0    Pain management: adequate       Mental Status:  Arousable and sleepy   Hydration Status:  Stable   PONV Controlled:  None   Airway Patency:  Patent     Post Op Vitals Reviewed: Yes    No anethesia notable event occurred.    Staff: Anesthesiologist               BP   149/82   Temp   98   Pulse 102   Resp 13   SpO2 94

## 2024-09-19 NOTE — OP NOTE
OPERATIVE REPORT  PATIENT NAME: Stacey Villatoro    :  1975  MRN: [id number]  Pt Location: [facility]OM 05    SURGERY DATE: 2024    Surgeons and Role:  Panel 1:     * Mert Brink MD - Primary     * IVETTE FreedC - Assisting  Panel 2:     * Jonathan Archibald MD - Primary    Preop Diagnosis:  Malignant neoplasm of lower-outer quadrant of right breast of female, estrogen receptor positive (HCC) [C50.511, Z17.0]  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) [C50.911, C77.3]  Atypical lobular hyperplasia (ALH) of left breast [N60.92]    Post-Op Diagnosis Codes:     * Malignant neoplasm of lower-outer quadrant of right breast of female, estrogen receptor positive (HCC) [C50.511, Z17.0]     * 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) [C50.911, C77.3]     * Atypical lobular hyperplasia (ALH) of left breast [N60.92]    Procedure(s):  Right - RIGHT BREAST MODIFIED RADICAL MASTECTOMY LEVEL I AND II LYMPH NODE DISSECTION  Left - LEFT MASTECTOMY. SAMY CLIPS IN THE RIGHT BREAST/AXILLARY LYMPH NODE AND LEFT BREAST  Bilateral - FIRST STAGE BILATERAL BREAST RECONSTRUCTION WITH TISSUE EXPANDERS AND ADM    Specimen(s):  ID Type Source Tests Collected by Time Destination   1 : RIGHT BREAST MASTECTOMY SUTURE HOLDEN AXILLARY TAIL Tissue Breast, Right TISSUE EXAM Mert Brink MD 2024 0846    2 : RIGHT AXILLARY LEVEL 1 AND LEVEL 2 LYMPH NODES Tissue Axillary TISSUE EXAM Mert Brink MD 2024 0913    3 : LEFT BREAST MASTECTOMY SUTURE MARKED AXILLARY TAIL Tissue Breast, Left TISSUE EXAM Mert Brink MD 2024 0933        Estimated Blood Loss:   Minimal    Drains:  * No LDAs found *    Anesthesia Type:   General    Operative Indications:  Malignant neoplasm of lower-outer quadrant of right breast of female, estrogen receptor positive (HCC) [C50.511, Z17.0]  Carcinoma of right breast metastatic to axillary lymph node  (HCC) [C50.911, C77.3]  Atypical lobular hyperplasia (ALH) of left breast [N60.92]      Operative Findings:  Right axillary Sheron clip in the axillary tail lymph node.  Sheron clip in the right breast and left breast.      Complications:   None    Procedure and Technique:  Stacey Villatoro was brought to the operation room and placed under general anesthesia.  Attention was paid to ensure appropriate padding and correct positioning        The bilateral breast was prepped and draped in a sterile fashion.  I initiated a time-out, identifying the patient, the correct side and the above procedure.  All parties agreed with the time out.     Right Mastectomy:  The planned incision was sharply incised.  Thin flaps were then elevated using electrocautery starting superiorly and then continuing medially, inferiorly and finally laterally.  The tail of Merlos was then dissected away from the axilla proper leaving the breast tethered to the underlying musculature.  The medial and lateral flaps were extended into the lower axilla.The breast was then removed from the muscles in a sub-facial plane.  The breast was oriented with suture at axillary tail. The breast was sent to pathology in formalin for permanent pathologic evaluation.    Right axillary Dissection:  The medial and lateral flaps were then extended into the upper axilla.  The pectoralis minor was then retracted medially and level 2 nodes were brought into field.  Just underneath they estimated position of the subclavian vein the deltopectoral fascia was released with electrocautery.  Blunt dissection was then taken down to identify intercostal nerve which was divided.  Along the medial wall the long thoracic nerve was identified and with meticulous dissection thoracodorsal neurovascular bundle was identified in the nerve carefully preserved along its course.  During this dissection small veins and arteries were either ligated cauterized or clipped to maintain meticulous  hemostasis.  The axillary contents (levels 1 and 2) within withdrawn and  from the remaining fascial attachments with electrocautery.  There then handed off the field for permanent pathologic analysis.  We were notified the sponge and instrument counts were correct x 2.  Hemostasis was rechecked and achieved.  Wound was packed with 2 moist lap pad.  I was present for the entire procedure. and A physician assistant was required during the procedure for retraction, tissue handling, dissection and suturing.    Left mastectomy  The planned incision was sharply incised.  Thin flaps were then elevated using electrocautery starting superiorly and then continuing medially, inferiorly and finally laterally.  The tail of Merlos was then dissected away from the axilla proper leaving the breast tethered to the underlying musculature.  The medial and lateral flaps were extended into the lower axilla.The breast was then removed from the muscles in a sub-facial plane.  The breast was oriented with suture at axillary tail. The breast was sent to pathology in formalin for permanent pathologic evaluation.  Hemostasis was rechecked and achieved.  Wound was packed with 2 moist lap pad.  The needle count lap counts sponge counts were correct x2.   I was present for the entire procedure. and A physician assistant was required during the procedure for retraction, tissue handling, dissection and suturing.    Patient Disposition:  Patient was handed over to plastic surgery in stable condition.    Axillary Lymph Node Dissection for Breast Cancer - Right  Operation performed with curative intent. Yes   Resection was performed within the boundaries of the axillary vein, chest wall (serratus anterior), and latissimus dorsi. Yes   Nerves identified and preserved during dissection (select all that apply). Long thoracic nerve and Thoracodorsal nerve   Level III nodes were removed. No                 SIGNATURE: Mert Brink,  MD  DATE: September 19, 2024  TIME: 9:37 AM

## 2024-09-19 NOTE — DISCHARGE INSTR - AVS FIRST PAGE
Breast Reconstruction with Tissue Expanders    Keep ELY dressings on until seen at your follow up appointment.   If the light is blinking green, the battery pack is functioning properly. Buzzing is normal.   If the light blinks orange, hit the orange button to re-establish the seal, this will usually correct the problem, if the light continues to blink orange, the dressing will still continue to function.  Call the office if the dressing becomes completely saturated.  Do NOT get ELY dressings wet.    2.         Milk the drain tubing using an alcohol wipe and empty the bulbs every 8 hours or as necessary as shown (uncap, drain fluid, record amount, discard, squeeze air out, recap). Keep track of total output from each drain per every 24 hours.   The drains will typically stay in 1-2 weeks post-operatively.  NO showering with drains in place.     3. Keep surgical bra on at all times, ok to place ABD pads between ELY dressings and bra for comfort, ok to open bra for comfort while in bed for short breaks.    4. No ice or heat to breasts.      5. No pushing, pulling, or lifting more than 10lbs, repetitive arm motions, or strenuous activity for 4-6 weeks post-operatively.         All Surgeries     You must be off all pain medications and have a clear field of vision before driving  For the next 24 hours:  Do not sign any legal documents or operate machinery.  Have a responsible adult help you.  Take it easy & rest.  Clear liquids first, if no nausea, progress to soft diet, and then regular diet as tolerated.  Take medications as ordered, and do not take any pain medication on an empty stomach. Once pain meds are finished you may alternate Tylenol & Advil as directed for pain  Make sure to take all antibiotics until completion  If lovenox or heparin was prescribed, use as directed until completion  Avoid alcoholic beverages.  Resume any prior medications at home unless otherwise instructed by Dr. Archibald.  Call   [facility] at [phone] -office,  Obvious bleeding, excessive swelling, and tenderness  Hardness of face on palpation  Swelling & hardness at eyelids.  Fever over 101.0°   Shortness of breath, severe calf or thigh pain.  Redness, odor, or pus at the wound {some oozing is normal from incision sites and may continue for several days ABD pads or feminine pads can be used for absorption )  Persistent vomiting or pain that is not relieved by your medication.  To make your follow-up appointment , ask a question, or report a problem

## 2024-09-19 NOTE — ANESTHESIA PREPROCEDURE EVALUATION
Procedure:  RIGHT BREAST MODIFIED RADICAL MASTECTOMY LEVEL I AND II LYMPH NODE DISSECTION (Right: Breast)  LEFT MASTECTOMY, SAMY CLIPS IN THE RIGHT BREAST/AXILLARY LYMPH NODE AND LEFT BREAST (Left: Breast)  FIRST STAGE BILATERAL BREAST RECONSTRUCTION WITH TISSUE EXPANDERS AND ADM (Bilateral: Breast)    Relevant Problems   ANESTHESIA (within normal limits)      CARDIO (within normal limits)      ENDO (within normal limits)      GI/HEPATIC (within normal limits)      /RENAL (within normal limits)      GYN   (+) Malignant neoplasm of lower-outer quadrant of right breast of female, estrogen receptor positive (HCC)      HEMATOLOGY (within normal limits)      MUSCULOSKELETAL (within normal limits)      NEURO/PSYCH (within normal limits)      PULMONARY (within normal limits)      Other   (+) 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)        Physical Exam    Airway    Mallampati score: II  TM Distance: >3 FB  Neck ROM: full     Dental   No notable dental hx     Cardiovascular  Cardiovascular exam normal    Pulmonary  Pulmonary exam normal     Other Findings  post-pubertal.      Anesthesia Plan  ASA Score- 1     Anesthesia Type- general with ASA Monitors.         Additional Monitors:     Airway Plan: ETT.           Plan Factors-Exercise tolerance (METS): >4 METS.    Chart reviewed. EKG reviewed. Imaging results reviewed. Existing labs reviewed. Patient summary reviewed.    Patient is not a current smoker.              Induction- intravenous.    Postoperative Plan- Plan for postoperative opioid use. Planned trial extubation    Perioperative Resuscitation Plan - Level 1 - Full Code.       Informed Consent- Anesthetic plan and risks discussed with patient.  I personally reviewed this patient with the CRNA. Discussed and agreed on the Anesthesia Plan with the CRNA..    Discussed General Anesthesia with patient including but not limited to risk of cardiac insult, pulmonary complication, stroke, reaction  to medications and death. All questions answered and consent was obtained.      NPO appropriate

## 2024-09-20 VITALS
DIASTOLIC BLOOD PRESSURE: 94 MMHG | RESPIRATION RATE: 18 BRPM | OXYGEN SATURATION: 94 % | SYSTOLIC BLOOD PRESSURE: 158 MMHG | WEIGHT: 151.01 LBS | TEMPERATURE: 98 F | HEART RATE: 100 BPM | HEIGHT: 63 IN | BODY MASS INDEX: 26.76 KG/M2

## 2024-09-20 LAB
ANION GAP SERPL CALCULATED.3IONS-SCNC: 5 MMOL/L (ref 4–13)
BUN SERPL-MCNC: 9 MG/DL (ref 5–25)
CALCIUM SERPL-MCNC: 8.1 MG/DL (ref 8.4–10.2)
CHLORIDE SERPL-SCNC: 107 MMOL/L (ref 96–108)
CO2 SERPL-SCNC: 24 MMOL/L (ref 21–32)
CREAT SERPL-MCNC: 0.55 MG/DL (ref 0.6–1.3)
ERYTHROCYTE [DISTWIDTH] IN BLOOD BY AUTOMATED COUNT: 12.5 % (ref 11.6–15.1)
GFR SERPL CREATININE-BSD FRML MDRD: 112 ML/MIN/1.73SQ M
GLUCOSE P FAST SERPL-MCNC: 110 MG/DL (ref 65–99)
GLUCOSE SERPL-MCNC: 110 MG/DL (ref 65–140)
HCT VFR BLD AUTO: 36 % (ref 34.8–46.1)
HGB BLD-MCNC: 12.1 G/DL (ref 11.5–15.4)
MAGNESIUM SERPL-MCNC: 2.1 MG/DL (ref 1.9–2.7)
MCH RBC QN AUTO: 30 PG (ref 26.8–34.3)
MCHC RBC AUTO-ENTMCNC: 33.6 G/DL (ref 31.4–37.4)
MCV RBC AUTO: 89 FL (ref 82–98)
PLATELET # BLD AUTO: 231 THOUSANDS/UL (ref 149–390)
PMV BLD AUTO: 9.7 FL (ref 8.9–12.7)
POTASSIUM SERPL-SCNC: 4 MMOL/L (ref 3.5–5.3)
RBC # BLD AUTO: 4.03 MILLION/UL (ref 3.81–5.12)
SODIUM SERPL-SCNC: 136 MMOL/L (ref 135–147)
WBC # BLD AUTO: 9.45 THOUSAND/UL (ref 4.31–10.16)

## 2024-09-20 PROCEDURE — 99024 POSTOP FOLLOW-UP VISIT: CPT

## 2024-09-20 PROCEDURE — 80048 BASIC METABOLIC PNL TOTAL CA: CPT | Performed by: PHYSICIAN ASSISTANT

## 2024-09-20 PROCEDURE — 85027 COMPLETE CBC AUTOMATED: CPT | Performed by: PHYSICIAN ASSISTANT

## 2024-09-20 PROCEDURE — 83735 ASSAY OF MAGNESIUM: CPT | Performed by: PHYSICIAN ASSISTANT

## 2024-09-20 RX ORDER — ENOXAPARIN SODIUM 100 MG/ML
40 INJECTION SUBCUTANEOUS DAILY
Qty: 2.8 ML | Refills: 0 | Status: SHIPPED | OUTPATIENT
Start: 2024-09-20 | End: 2024-09-27

## 2024-09-20 RX ORDER — CYCLOBENZAPRINE HCL 10 MG
10 TABLET ORAL 3 TIMES DAILY
Qty: 21 TABLET | Refills: 0 | Status: SHIPPED | OUTPATIENT
Start: 2024-09-20 | End: 2024-09-30 | Stop reason: SDUPTHER

## 2024-09-20 RX ORDER — OXYCODONE HYDROCHLORIDE 5 MG/1
5 TABLET ORAL EVERY 6 HOURS PRN
Qty: 10 TABLET | Refills: 0 | Status: SHIPPED | OUTPATIENT
Start: 2024-09-20 | End: 2024-09-30 | Stop reason: SDUPTHER

## 2024-09-20 RX ORDER — CEPHALEXIN 500 MG/1
500 CAPSULE ORAL 3 TIMES DAILY
Qty: 63 CAPSULE | Refills: 0 | Status: SHIPPED | OUTPATIENT
Start: 2024-09-20 | End: 2024-10-11

## 2024-09-20 RX ORDER — ACETAMINOPHEN 500 MG
1000 TABLET ORAL EVERY 6 HOURS
Qty: 56 TABLET | Refills: 0 | Status: SHIPPED | OUTPATIENT
Start: 2024-09-20 | End: 2024-09-27

## 2024-09-20 RX ORDER — GABAPENTIN 300 MG/1
300 CAPSULE ORAL 3 TIMES DAILY
Qty: 21 CAPSULE | Refills: 0 | Status: SHIPPED | OUTPATIENT
Start: 2024-09-20 | End: 2024-09-30 | Stop reason: SDUPTHER

## 2024-09-20 RX ADMIN — HEPARIN SODIUM 5000 UNITS: 5000 INJECTION INTRAVENOUS; SUBCUTANEOUS at 02:13

## 2024-09-20 RX ADMIN — GABAPENTIN 300 MG: 300 CAPSULE ORAL at 08:43

## 2024-09-20 RX ADMIN — ACETAMINOPHEN 975 MG: 325 TABLET, FILM COATED ORAL at 02:13

## 2024-09-20 RX ADMIN — CEFAZOLIN SODIUM 1000 MG: 1 SOLUTION INTRAVENOUS at 08:43

## 2024-09-20 RX ADMIN — ACETAMINOPHEN 975 MG: 325 TABLET, FILM COATED ORAL at 08:43

## 2024-09-20 RX ADMIN — CYCLOBENZAPRINE HYDROCHLORIDE 10 MG: 10 TABLET, FILM COATED ORAL at 08:42

## 2024-09-20 RX ADMIN — DOCUSATE SODIUM 100 MG: 100 CAPSULE, LIQUID FILLED ORAL at 08:43

## 2024-09-20 RX ADMIN — CEFAZOLIN SODIUM 1000 MG: 1 SOLUTION INTRAVENOUS at 00:03

## 2024-09-20 NOTE — PLAN OF CARE
Problem: PAIN - ADULT  Goal: Verbalizes/displays adequate comfort level or baseline comfort level  Description: Interventions:  - Encourage patient to monitor pain and request assistance  - Assess pain using appropriate pain scale  - Administer analgesics based on type and severity of pain and evaluate response  - Implement non-pharmacological measures as appropriate and evaluate response  - Consider cultural and social influences on pain and pain management  - Notify physician/advanced practitioner if interventions unsuccessful or patient reports new pain  Outcome: Progressing     Problem: INFECTION - ADULT  Goal: Absence or prevention of progression during hospitalization  Description: INTERVENTIONS:  - Assess and monitor for signs and symptoms of infection  - Monitor lab/diagnostic results  - Monitor all insertion sites, i.e. indwelling lines, tubes, and drains  - Monitor endotracheal if appropriate and nasal secretions for changes in amount and color  - [place] appropriate cooling/warming therapies per order  - Administer medications as ordered  - Instruct and encourage patient and family to use good hand hygiene technique  - Identify and instruct in appropriate isolation precautions for identified infection/condition  Outcome: Progressing

## 2024-09-20 NOTE — PROGRESS NOTES
"Progress Note - General Surgery   Stacey Villatoro 49 y.o. female MRN: [id number]  Unit/Bed#: -Rock Encounter: [id number]    Assessment:  Stacey Villatoro is a 49 y.o. female POD1 s/p bilateral mastectomy with reconstruction.    AVSS, labs within normal limits    Plan:  Discharge home  PRN pain medication and anti-emetics  Encourage ambulation  DVT ppx: heparin  Incentive spirometry 10 times/hour while awake  Continue home medications as prescribed     Subjective/Objective    Subjective: No acute events overnight.     Objective:     Blood pressure 158/94, pulse 100, temperature 98 °F (36.7 °C), resp. rate 18, height 5' 3\" (1.6 m), weight 68.5 kg (151 lb 0.2 oz), last menstrual period 09/12/2024, SpO2 94%.,Body mass index is 26.75 kg/m².      Intake/Output Summary (Last 24 hours) at 9/20/2024 0843  Last data filed at 9/19/2024 2242  Gross per 24 hour   Intake 1433.75 ml   Output 295 ml   Net 1138.75 ml       Invasive Devices       Peripheral Intravenous Line  Duration             Peripheral IV 09/19/24 Dorsal (posterior);Left Hand 1 day              Drain  Duration             Closed/Suction Drain Inferior;Left Breast Bulb 15 Fr. <1 day    Closed/Suction Drain Inferior;Right Breast Bulb 15 Fr. <1 day    Closed/Suction Drain Lateral;Left Breast Bulb 15 Fr. <1 day    Closed/Suction Drain Right Breast Bulb 15 Fr. <1 day                    Physical Exam:   GEN: NAD  HEENT: NCAT, MMM  Chest: lori dressing in place and functioning c/d/I, no erythema, edema, exudate. Drains with SS output.  CV: RRR, no m/r/g  Lung: Normal effort, CTA B/L, no w/r/r  Ab: Soft, NT/ND  Extrem: No CCE   Neuro: A+Ox3     Lab, Imaging and other studies:I have personally reviewed pertinent lab results.     VTE Pharmacologic Prophylaxis: Heparin  VTE Mechanical Prophylaxis: sequential compression device    Recent Results (from the past 36 hour(s))   POCT pregnancy, urine    Collection Time: 09/19/24  7:07 AM   Result Value Ref Range    EXT Preg " Test, Ur Negative Negative    Control Valid Valid   Basic metabolic panel    Collection Time: 09/20/24  4:55 AM   Result Value Ref Range    Sodium 136 135 - 147 mmol/L    Potassium 4.0 3.5 - 5.3 mmol/L    Chloride 107 96 - 108 mmol/L    CO2 24 21 - 32 mmol/L    ANION GAP 5 4 - 13 mmol/L    BUN 9 5 - 25 mg/dL    Creatinine 0.55 (L) 0.60 - 1.30 mg/dL    Glucose 110 65 - 140 mg/dL    Glucose, Fasting 110 (H) 65 - 99 mg/dL    Calcium 8.1 (L) 8.4 - 10.2 mg/dL    eGFR 112 ml/min/1.73sq m   Magnesium    Collection Time: 09/20/24  4:55 AM   Result Value Ref Range    Magnesium 2.1 1.9 - 2.7 mg/dL   CBC and Platelet    Collection Time: 09/20/24  4:55 AM   Result Value Ref Range    WBC 9.45 4.31 - 10.16 Thousand/uL    RBC 4.03 3.81 - 5.12 Million/uL    Hemoglobin 12.1 11.5 - 15.4 g/dL    Hematocrit 36.0 34.8 - 46.1 %    MCV 89 82 - 98 fL    MCH 30.0 26.8 - 34.3 pg    MCHC 33.6 31.4 - 37.4 g/dL    RDW 12.5 11.6 - 15.1 %    Platelets 231 149 - 390 Thousands/uL    MPV 9.7 8.9 - 12.7 fL

## 2024-09-24 ENCOUNTER — OFFICE VISIT (OUTPATIENT)
Age: 49
End: 2024-09-24

## 2024-09-24 VITALS
HEIGHT: 63 IN | SYSTOLIC BLOOD PRESSURE: 132 MMHG | WEIGHT: 151 LBS | DIASTOLIC BLOOD PRESSURE: 84 MMHG | TEMPERATURE: 98.5 F | OXYGEN SATURATION: 99 % | BODY MASS INDEX: 26.75 KG/M2 | HEART RATE: 121 BPM

## 2024-09-24 DIAGNOSIS — Z17.0 MALIGNANT NEOPLASM OF LOWER-OUTER QUADRANT OF RIGHT BREAST OF FEMALE, ESTROGEN RECEPTOR POSITIVE (HCC): Primary | ICD-10-CM

## 2024-09-24 DIAGNOSIS — C50.511 MALIGNANT NEOPLASM OF LOWER-OUTER QUADRANT OF RIGHT BREAST OF FEMALE, ESTROGEN RECEPTOR POSITIVE (HCC): Primary | ICD-10-CM

## 2024-09-24 DIAGNOSIS — Z98.890 S/P BREAST RECONSTRUCTION, BILATERAL: ICD-10-CM

## 2024-09-24 PROCEDURE — 99024 POSTOP FOLLOW-UP VISIT: CPT

## 2024-09-24 NOTE — PROGRESS NOTES
[facility]  [address], [address], [place] [address]  [phone]    Patient Identification: Stacey Villatoro is a 49 y.o. female     History of Present Illness: The patient is a 49 y.o.  year-old female  who presents to the office for post-op visit. Patient is 5 days s/p Bilateral first stage breast reconstruction and placement of submuscular Mohler textured 750 mL Yoel tissue expanders, initial fill 100 mL, reinforcement of inferior pole with Flex HD acellular dermal matrix, bilateral pectoralis block with Exparel, right sided axillary advancement flap, 15 cm x 10 cm, placement of bilateral ely none DME negative pressure wound therapy dressings, less than 50 cm² each, total less than 50 cm². on 9/19/2024 by Dr. Brink and Dr. Archibald.  Pt is doing well at this time, denies fevers, chills, sign of infection or significant pain. Recording drain outputs daily. Taking oxycodone as needed for pain at night. Wearing bra and following activity restrictions. Has a follow up appt with Surg Onc in 2 weeks.   Patient has no complaints at this time.    Past Medical History:   Diagnosis Date    Arthritis     BRCA1 negative     BRCA2 negative     Breast cancer (HCC)     Breast mass     Cancer (HCC)           Review of Systems  Constitutional: Denies fevers, chills or pain.  Skin: Denies any warmth, erythema, edema or mucopurulent drainage.     Physical Exam    Breast: ELY removed. Surgical incisions are clean, dry, and intact. Skin flap perfusion is intact. Expected amount of post-operative edema and bruising. There are no signs of infection, obvious hematoma, seroma or wound dehiscence. Drains are patent bilaterally with sanguineous fluid in bulbs.    Assessment and Plan:  The patient is an 49 y.o.  year-old female who presents to the office for post-op visit. Patient is 5 days s/p Right Breast Modified Radical Mastectomy Level I And Ii Lymph Node Dissection - Right, Left  Mastectomy, Sheron Clips In The Right Breast/axillary Lymph Node And Left Breast - Left, and First Stage Bilateral Breast Reconstruction With Tissue Expanders And Adm - Bilateral  on 9/19/2024 by Dr. Brink and Dr. Archibald.      -At today's visit left breast and right breast drains removed. Covered with bacitracin and badn-aid. Pt may change band-aid as needed.  -Continue wearing surgical compression bra at all times. Patient is to refrain from exercise/repetitive arm movements for 4-6 weeks post-op. Sleep on back at an incline. No submerging in water (pools, baths, hottubs, etc.) until 8 weeks post-op.  -May apply bacitracin daily to incision sites.  -Patient is to continue recording daily drain outputs.  -The patient is to return in 1 week for drain and breast evaluation  -The patient is to call the office with any questions or concerns. All of the patient's questions were answered at this time and they agree with the plan of care.      Paige Birmingham PA-C  [facility]

## 2024-09-26 ENCOUNTER — TELEPHONE (OUTPATIENT)
Age: 49
End: 2024-09-26

## 2024-09-26 ENCOUNTER — OFFICE VISIT (OUTPATIENT)
Dept: PLASTIC SURGERY | Facility: CLINIC | Age: 49
End: 2024-09-26

## 2024-09-26 ENCOUNTER — OFFICE VISIT (OUTPATIENT)
Dept: HEMATOLOGY ONCOLOGY | Facility: CLINIC | Age: 49
End: 2024-09-26
Payer: COMMERCIAL

## 2024-09-26 ENCOUNTER — TELEPHONE (OUTPATIENT)
Dept: HEMATOLOGY ONCOLOGY | Facility: CLINIC | Age: 49
End: 2024-09-26

## 2024-09-26 VITALS
OXYGEN SATURATION: 99 % | HEART RATE: 131 BPM | RESPIRATION RATE: 18 BRPM | DIASTOLIC BLOOD PRESSURE: 80 MMHG | WEIGHT: 148 LBS | TEMPERATURE: 98.6 F | HEIGHT: 63 IN | BODY MASS INDEX: 26.22 KG/M2 | SYSTOLIC BLOOD PRESSURE: 140 MMHG

## 2024-09-26 DIAGNOSIS — Z17.0 MALIGNANT NEOPLASM OF LOWER-OUTER QUADRANT OF RIGHT BREAST OF FEMALE, ESTROGEN RECEPTOR POSITIVE (HCC): ICD-10-CM

## 2024-09-26 DIAGNOSIS — Z98.890 S/P BREAST RECONSTRUCTION, BILATERAL: Primary | ICD-10-CM

## 2024-09-26 DIAGNOSIS — Z17.0 MALIGNANT NEOPLASM OF LOWER-OUTER QUADRANT OF RIGHT BREAST OF FEMALE, ESTROGEN RECEPTOR POSITIVE (HCC): Primary | ICD-10-CM

## 2024-09-26 DIAGNOSIS — C50.511 MALIGNANT NEOPLASM OF LOWER-OUTER QUADRANT OF RIGHT BREAST OF FEMALE, ESTROGEN RECEPTOR POSITIVE (HCC): Primary | ICD-10-CM

## 2024-09-26 DIAGNOSIS — C50.511 MALIGNANT NEOPLASM OF LOWER-OUTER QUADRANT OF RIGHT BREAST OF FEMALE, ESTROGEN RECEPTOR POSITIVE (HCC): ICD-10-CM

## 2024-09-26 PROBLEM — C50.911 CARCINOMA OF RIGHT BREAST METASTATIC TO AXILLARY LYMPH NODE (HCC): Status: RESOLVED | Noted: 2024-08-20 | Resolved: 2024-09-26

## 2024-09-26 PROBLEM — C77.3 CARCINOMA OF RIGHT BREAST METASTATIC TO AXILLARY LYMPH NODE (HCC): Status: RESOLVED | Noted: 2024-08-20 | Resolved: 2024-09-26

## 2024-09-26 PROCEDURE — 88307 TISSUE EXAM BY PATHOLOGIST: CPT | Performed by: PATHOLOGY

## 2024-09-26 PROCEDURE — 88341 IMHCHEM/IMCYTCHM EA ADD ANTB: CPT | Performed by: PATHOLOGY

## 2024-09-26 PROCEDURE — 99215 OFFICE O/P EST HI 40 MIN: CPT | Performed by: INTERNAL MEDICINE

## 2024-09-26 PROCEDURE — 88342 IMHCHEM/IMCYTCHM 1ST ANTB: CPT | Performed by: PATHOLOGY

## 2024-09-26 PROCEDURE — 99024 POSTOP FOLLOW-UP VISIT: CPT

## 2024-09-26 PROCEDURE — 88305 TISSUE EXAM BY PATHOLOGIST: CPT | Performed by: PATHOLOGY

## 2024-09-26 NOTE — PROGRESS NOTES
Chemotherapy education provided to patient and spouse      Plan is Docetaxel and cyclophosphamide Q21 days X 4 cycles        Provided and reviewed medication information sheets, phone contact sheet, and diarrhea management sheet to patient.     Reviewed need for blood work prior to each cycle.  Reviewed dexamethasone day before, day of, and day after treatment.       Chemotherapy consent signed.      Patient will notify the office if she would like to move forward with scheduling treatment.  Patient is interested in [facility] cold cap.

## 2024-09-26 NOTE — TELEPHONE ENCOUNTER
Patient had double mastectomy and believes one of the remaining drains is leaking if not come out    Is concerned and would like to speak with someone.

## 2024-09-26 NOTE — PROGRESS NOTES
Hematology/Oncology Outpatient Follow-up  Stacey Villatoro 49 y.o. female 1975 [id number]    Date:  9/26/2024        Assessment and Plan:  1. Malignant neoplasm of lower-outer quadrant of right breast of female, estrogen receptor positive (HCC)  Anatomical stage IIIa and pathological stage Ib (pT2, pN2a, cM0, G2), ER positive, NJ positive, HER2/divina 1+ negative, 4 /10 involved with metastatic disease with extranodal extension.  Status post radical bilateral mastectomy on 9/19/2024.  In the left breast mastectomy revealed noninvasive lobular neoplasia.    The patient and her  were educated extensively about the pathology post bilateral modified radical mastectomy.  She was told that even though the Oncotype DX recurrence score came back at the low score of 20, she would definitely benefit from adjuvant chemotherapy since she had 4 lymph nodes involved in the right axilla with high risk features including extranodal extension.    The patient was educated about adjuvant chemotherapy with Taxotere and cyclophosphamide (TC) on every 3-week basis for total of 4 cycles.  She was educated extensively about the potential side effects and toxicity related to the adjuvant chemotherapy.    We also discussed the benefit of adjuvant radiation treatment which will be further discussed by the radiation oncology team.  Finally, we discussed the need for endocrine therapy with one of the aromatase inhibitors in combination of ovarian suppression with Lupron or Zoladex since she is perimenopausal.  She also meets the high risk criteria for CDK 4-6 inhibitor for 2 years of abemaciclib adjuvantly.            HPI:  This is a 49-year-old female without significant past medical history.  Family history is negative for breast cancer.  The patient apparently had her first screening mammogram on 6/18/2024 which was read as abnormal with right breast mass at 7 o'clock position 7 cm from the nipple with suspicious lymph adenopathy in  the right axilla.  She then had multiple imaging including diagnostic mammogram, ultrasound of the right breast and ultrasound-guided biopsy of the right breast mass and right axillary lymph node on 7/11/2024.  The pathology was compatible with invasive breast cancer, ER strongly +95%, NV +70%, HER2/divina negative 1+.  CT scan of the chest abdomen pelvis on 7/18/2024 was negative for any obvious signs of metastatic disease.  MRI of the breast bilaterally on 7/19/2024 showed  MPRESSION:   Continued surgical and oncologic management is recommended for the biopsy proven malignancy of the right breast corresponding to a 3.4 cm mass at 7:00 in the right breast and the biopsy proven metastatic right axillary lymph node. There are approximately 4 enlarged right level 1 lymph nodes. No internal mammary adenopathy.      Indeterminate mass at 12:00 and non-mass enhancement at 9:00 in the right breast. If patient is pursuing breast conservation therapy, two site MRI guided biopsy of the right breast is recommended.      Indeterminate non-mass enhancement at 7:00 in the left breast. MRI guided biopsy is recommended.     Biopsy from the left breast at 7 o'clock position showed lobular neoplasia( ALH & LCIS, 4 foci with 5mm largest focus) .  She also had an MRI guided biopsy of the right breast on T 12:00 and 10 o'clock position which came back negative for malignant process.    [facility] molecular screening on 6/4/2024 was negative for obvious genetic alteration.    The patient then underwent bilateral total mastectomy on 9/19/2024 which showed:  Final Diagnosis   A.  Right breast (modified radical mastectomy):     - Invasive breast carcinoma of no special type (ductal NST / invasive ductal carcinoma).      - Tumor size: 34 mm.  Tumor stgstrstastdstest:st st1st of 3.      - Invasive carcinoma less than 1 mm from posterior margin.      - Separate tumor nodule, 11 mm, most compatible with completely replaced lymph node.     - One (1) additional lymph  node, negative for carcinoma.      B.  Levels 1-2, right axilla (dissection):     - Metastatic mammary carcinoma involving four (4) of ten (10) lymph nodes.      - Tumor deposits measure from 7-20 mm; positive for extranodal extension.     C.  Left breast (mastectomy):     - Breast tissue with non-invasive lobular neoplasia (ALH / LCIS) and atypical ductal hyperplasia (ADH).     - Negative for invasive carcinoma.     D.  New superior margin, right breast (excision):     - Benign skin and subcutaneous tissue.       Oncotype Dx recurrence score came back 20.    Oncology History   Malignant neoplasm of lower-outer quadrant of right breast of female, estrogen receptor positive (HCC)   7/11/2024 Initial Diagnosis    Malignant neoplasm of lower-outer quadrant of right breast of female, estrogen receptor positive (HCC)     7/11/2024 Biopsy    Right breast ultrasound guided biopsy  A. 7 o'clock 7 cm from nipple  Invasive breast carcinoma of no special type (ductal NST/invasive ductal carcinoma)   Grade 2  ER 95  OH 61  HER2 1+  No lymphovascular invasion    B. Right axillary lymph node  Invasive ductal carcinoma of mammary origin, involving fibroadipose tissue, see comment.   Lymphoid tissue is not identified.    C. Right axillary lymph node  Invasive ductal carcinoma of mammary origin, involving fibroadipose tissue, see comment.   Lymphoid tissue is not identified.    Comment: Parts B and C are submitted as axillary lymph node, however no lymphoid tissue is identified. Carcinoma is present with expression of Jennifer-3 (performed on B/C) supporting the above diagnosis. However, clinical and radiographic correlation is advised as these findings may represent entire andria replacement by metastatic carcinoma or direct extension of the tumor.     Right malignancy appears unifocal. The biopsy-proven carcinoma measured 0.6 cm on ultrasound. There is metastatic disease to a right axillary lymph node.  A second abnormal appearing  lymph node is noted in the right axilla (not biopsied). Recent imaging of the contralateral left breast dated 6/18/2024 was reviewed and shows no suspicious findings.      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 No pathogenic sequence variants or deletions/duplications identified         Interval history:    ROS: Review of Systems   Constitutional:  Negative for chills and fever.   HENT:  Negative for ear pain and sore throat.    Eyes:  Negative for pain and visual disturbance.    Respiratory:  Negative for cough and shortness of breath.    Cardiovascular:  Negative for chest pain and palpitations.   Gastrointestinal:  Negative for abdominal pain and vomiting.   Genitourinary:  Negative for dysuria and hematuria.   Musculoskeletal:  Negative for arthralgias and back pain.   Skin:  Negative for color change and rash.   Neurological:  Negative for seizures and syncope.   All other systems reviewed and are negative.      Past Medical History:   Diagnosis Date    Arthritis     BRCA1 negative     BRCA2 negative     Breast cancer (HCC)     Breast mass     Cancer (HCC)        Past Surgical History:   Procedure Laterality Date    BREAST BIOPSY Right 2024     SECTION      x2    FOOT SURGERY      MAMMO NEEDLE LOCALIZATION LEFT (ALL INC) Left 9/3/2024    MRI BREAST BIOPSY LEFT (ALL INCLUSIVE) Left 2024    MRI BREAST BIOPSY RIGHT (ALL INCLUSIVE) Right 2024    WY MAST MODF RAD W/AX LYMPH NOD W/WO PECT/AMALIA MIN Right 2024    Procedure: RIGHT BREAST MODIFIED RADICAL MASTECTOMY LEVEL I AND II LYMPH NODE DISSECTION;  Surgeon: Mert Brink MD;  Location: [facility];  Service: Surgical Oncology    WY MASTECTOMY SIMPLE COMPLETE Left 2024    Procedure: LEFT MASTECTOMY, SAMY CLIPS IN THE RIGHT BREAST/AXILLARY LYMPH NODE AND LEFT BREAST;  Surgeon: Mert Brink MD;  Location: [facility];  Service: Surgical Oncology    WY TISSUE EXPANDER PLACEMENT BREAST RECONSTRUCTION Bilateral 2024    Procedure: FIRST STAGE BILATERAL BREAST RECONSTRUCTION WITH TISSUE EXPANDERS AND ADM;  Surgeon: Jonathan Archibald MD;  Location: [facility];  Service: Plastics    US GUIDED BREAST BIOPSY RIGHT COMPLETE Right 2024    US GUIDED BREAST LYMPH NODE BIOPSY RIGHT Right 2024       Social History     Socioeconomic History    Marital status: /Civil Union     Spouse name: None    Number of children: None    Years of education: None    Highest education level:  None   Occupational History    None   Tobacco Use    Smoking status: Never     Passive exposure: Never    Smokeless tobacco: Never   Vaping Use    Vaping status: Never Used   Substance and Sexual Activity    Alcohol use: Not Currently     Comment: Very rarely, 1 or 2 beers if i do.    Drug use: No    Sexual activity: Yes     Partners: Male     Comment: Tubes tied 16 years ago   Other Topics Concern    None   Social History Narrative    Lives with  and kids     Work for [employer]      Social Determinants of Health     Financial Resource Strain: Not on file   Food Insecurity: Not on file   Transportation Needs: Not on file   Physical Activity: Not on file   Stress: Not on file   Social Connections: Not on file   Intimate Partner Violence: Not on file   Housing Stability: Not on file       Family History   Problem Relation Age of Onset    Dementia Mother     Lung cancer Mother     Heart disease Father     Skin cancer Father     Heart attack Father         AT THE AGE OF 60    No Known Problems Son     No Known Problems Daughter     No Known Problems Maternal Grandmother     No Known Problems Maternal Grandfather     No Known Problems Paternal Grandmother     No Known Problems Paternal Grandfather     Breast cancer Neg Hx        No Known Allergies      Current Outpatient Medications:     cephalexin (KEFLEX) 500 mg capsule, Take 1 capsule (500 mg total) by mouth 3 (three) times a day for 21 days, Disp: 63 capsule, Rfl: 0    cyclobenzaprine (FLEXERIL) 10 mg tablet, Take 1 tablet (10 mg total) by mouth 3 (three) times a day for 7 days, Disp: 21 tablet, Rfl: 0    enoxaparin (Lovenox) 40 mg/0.4 mL, Inject 0.4 mL (40 mg total) under the skin in the morning for 7 days, Disp: 2.8 mL, Rfl: 0    gabapentin (Neurontin) 300 mg capsule, Take 1 capsule (300 mg total) by mouth 3 (three) times a day for 7 days, Disp: 21 capsule, Rfl: 0    loratadine (CLARITIN) 10 mg tablet, Take 10 mg by mouth as needed for allergies, Disp: , Rfl:  "    oxyCODONE (Roxicodone) 5 immediate release tablet, Take 1 tablet (5 mg total) by mouth every 6 (six) hours as needed for severe pain Max Daily Amount: 20 mg, Disp: 10 tablet, Rfl: 0    acetaminophen (TYLENOL) 500 mg tablet, Take 2 tablets (1,000 mg total) by mouth every 6 (six) hours for 7 days (Patient not taking: Reported on 9/24/2024), Disp: 56 tablet, Rfl: 0      Physical Exam:  /80 (BP Location: Left arm, Patient Position: Sitting, Cuff Size: Adult)   Pulse (!) 131   Temp 98.6 °F (37 °C)   Resp 18   Ht 5' 3\" (1.6 m)   Wt 67.1 kg (148 lb)   LMP 09/12/2024   SpO2 99%   BMI 26.22 kg/m²     Physical Exam  Constitutional:       General: She is not in acute distress.     Appearance: She is well-developed. She is not diaphoretic.   HENT:      Head: Normocephalic and atraumatic.      Nose: Nose normal.   Eyes:      General: No scleral icterus.        Right eye: No discharge.         Left eye: No discharge.      Conjunctiva/sclera: Conjunctivae normal.      Pupils: Pupils are equal, round, and reactive to light.   Neck:      Thyroid: No thyromegaly.      Vascular: No JVD.      Trachea: No tracheal deviation.   Cardiovascular:      Rate and Rhythm: Normal rate and regular rhythm.      Heart sounds: Normal heart sounds. No murmur heard.     No friction rub.   Pulmonary:      Effort: Pulmonary effort is normal. No respiratory distress.      Breath sounds: Normal breath sounds. No stridor. No wheezing or rales.   Chest:      Chest wall: No tenderness.   Abdominal:      General: There is no distension.      Palpations: Abdomen is soft. There is no hepatomegaly or splenomegaly.      Tenderness: There is no abdominal tenderness. There is no guarding or rebound.   Musculoskeletal:         General: No tenderness or deformity. Normal range of motion.      Cervical back: Normal range of motion and neck supple.   Lymphadenopathy:      Cervical: No cervical adenopathy.   Skin:     General: Skin is warm and dry.     " " Coloration: Skin is not pale.      Findings: No erythema or rash.   Neurological:      Mental Status: She is alert and oriented to person, place, and time.      Cranial Nerves: No cranial nerve deficit.      Coordination: Coordination normal.      Deep Tendon Reflexes: Reflexes are normal and symmetric.   Psychiatric:         Behavior: Behavior normal.         Thought Content: Thought content normal.         Judgment: Judgment normal.           Labs:  Lab Results   Component Value Date    WBC 9.45 09/20/2024    HGB 12.1 09/20/2024    HCT 36.0 09/20/2024    MCV 89 09/20/2024     09/20/2024     Lab Results   Component Value Date    K 4.0 09/20/2024     09/20/2024    CO2 24 09/20/2024    BUN 9 09/20/2024    CREATININE 0.55 (L) 09/20/2024    GLUF 110 (H) 09/20/2024    CALCIUM 8.1 (L) 09/20/2024    AST 14 07/18/2024    ALT 9 07/18/2024    ALKPHOS 55 07/18/2024    EGFR 112 09/20/2024     No results found for: \"TSH\"    Patient voiced understanding and agreement in the above discussion. Aware to contact our office with questions/symptoms in the interim.   "

## 2024-09-30 ENCOUNTER — OFFICE VISIT (OUTPATIENT)
Age: 49
End: 2024-09-30

## 2024-09-30 ENCOUNTER — TELEPHONE (OUTPATIENT)
Age: 49
End: 2024-09-30

## 2024-09-30 VITALS
HEART RATE: 127 BPM | WEIGHT: 147 LBS | OXYGEN SATURATION: 99 % | SYSTOLIC BLOOD PRESSURE: 127 MMHG | BODY MASS INDEX: 26.04 KG/M2 | DIASTOLIC BLOOD PRESSURE: 82 MMHG | TEMPERATURE: 98.1 F

## 2024-09-30 DIAGNOSIS — Z98.890 STATUS POST BILATERAL BREAST RECONSTRUCTION: ICD-10-CM

## 2024-09-30 DIAGNOSIS — Z48.89 ENCOUNTER FOR FOLLOW-UP CARE INVOLVING PLASTIC SURGERY: Primary | ICD-10-CM

## 2024-09-30 PROCEDURE — 99024 POSTOP FOLLOW-UP VISIT: CPT | Performed by: PHYSICIAN ASSISTANT

## 2024-09-30 RX ORDER — CYCLOBENZAPRINE HCL 10 MG
10 TABLET ORAL 3 TIMES DAILY
Qty: 21 TABLET | Refills: 0 | Status: SHIPPED | OUTPATIENT
Start: 2024-09-30 | End: 2024-10-09

## 2024-09-30 RX ORDER — OXYCODONE HYDROCHLORIDE 5 MG/1
5 TABLET ORAL EVERY 6 HOURS PRN
Qty: 10 TABLET | Refills: 0 | Status: SHIPPED | OUTPATIENT
Start: 2024-09-30

## 2024-09-30 RX ORDER — GABAPENTIN 300 MG/1
300 CAPSULE ORAL 3 TIMES DAILY
Qty: 21 CAPSULE | Refills: 0 | Status: SHIPPED | OUTPATIENT
Start: 2024-09-30 | End: 2024-10-09

## 2024-09-30 NOTE — TELEPHONE ENCOUNTER
Patient called and had some clinical questions about her dressing and wanted to know how long she should have it on for and how to care for the area while dressing is on.     Please call back at [phone].

## 2024-09-30 NOTE — PROGRESS NOTES
Patient Identification: Stacey Villatoro is a 49 y.o. female     History of Present Illness: The patient is a 49 y.o.  year-old female  who presents to the office for post-op visit. Patient is 11 days s/p Bilateral first stage breast reconstruction and placement of submuscular [place] textured 750 mL Yoel tissue expanders, initial fill 100 mL, reinforcement of inferior pole with Flex HD acellular dermal matrix, bilateral pectoralis block with Exparel, right sided axillary advancement flap, 15 cm x 10 cm, placement of bilateral lori none DME negative pressure wound therapy dressings, less than 50 cm² each, total less than 50 cm² on 9/19/2024.    Pt is doing ok today, states her drain sites are hurting, denies fevers, chills, sign of infection or significant pain. Denies SOB or chest pain. Recording drain outputs daily.  Reports bilateral drains are still putting out over 30 cc daily. She is taking abx and has not showered. Wearing bra and following activity restrictions.  She has been applying antibiotic ointment to her incisions daily.    The patient saw hematology/oncology on 9/26/2024, the patient would benefit from chemotherapy and radiation therapy.    Past Medical History:   Diagnosis Date    Arthritis     BRCA1 negative     BRCA2 negative     Breast cancer (HCC)     Breast mass     Cancer (HCC)       Patient Active Problem List   Diagnosis    Bilateral primary osteoarthritis of knee    BMI 30.0-30.9,adult    Malignant neoplasm of lower-outer quadrant of right breast of female, estrogen receptor positive (HCC)    Status post bilateral breast reconstruction       Current Outpatient Medications:     cephalexin (KEFLEX) 500 mg capsule, Take 1 capsule (500 mg total) by mouth 3 (three) times a day for 21 days, Disp: 63 capsule, Rfl: 0    cyclobenzaprine (FLEXERIL) 10 mg tablet, Take 1 tablet (10 mg total) by mouth 3 (three) times a day for 7 days, Disp: 21 tablet, Rfl: 0    gabapentin (Neurontin) 300 mg capsule,  Take 1 capsule (300 mg total) by mouth 3 (three) times a day for 7 days, Disp: 21 capsule, Rfl: 0    loratadine (CLARITIN) 10 mg tablet, Take 10 mg by mouth as needed for allergies, Disp: , Rfl:     oxyCODONE (Roxicodone) 5 immediate release tablet, Take 1 tablet (5 mg total) by mouth every 6 (six) hours as needed for severe pain Max Daily Amount: 20 mg, Disp: 10 tablet, Rfl: 0    enoxaparin (Lovenox) 40 mg/0.4 mL, Inject 0.4 mL (40 mg total) under the skin in the morning for 7 days, Disp: 2.8 mL, Rfl: 0    Past Surgical History:   Procedure Laterality Date    BREAST BIOPSY Right 2024     SECTION      x2    FOOT SURGERY      MAMMO NEEDLE LOCALIZATION LEFT (ALL INC) Left 9/3/2024    MRI BREAST BIOPSY LEFT (ALL INCLUSIVE) Left 2024    MRI BREAST BIOPSY RIGHT (ALL INCLUSIVE) Right 2024    GA MAST MODF RAD W/AX LYMPH NOD W/WO PECT/AMALIA MIN Right 2024    Procedure: RIGHT BREAST MODIFIED RADICAL MASTECTOMY LEVEL I AND II LYMPH NODE DISSECTION;  Surgeon: Mert Brink MD;  Location: [facility];  Service: Surgical Oncology    GA MASTECTOMY SIMPLE COMPLETE Left 2024    Procedure: LEFT MASTECTOMY, SAMY CLIPS IN THE RIGHT BREAST/AXILLARY LYMPH NODE AND LEFT BREAST;  Surgeon: Mert Brink MD;  Location: [facility];  Service: Surgical Oncology    GA TISSUE EXPANDER PLACEMENT BREAST RECONSTRUCTION Bilateral 2024    Procedure: FIRST STAGE BILATERAL BREAST RECONSTRUCTION WITH TISSUE EXPANDERS AND ADM;  Surgeon: Jonathan Archibald MD;  Location: [facility];  Service: Plastics    US GUIDED BREAST BIOPSY RIGHT COMPLETE Right 2024    US GUIDED BREAST LYMPH NODE BIOPSY RIGHT Right 2024     Social History     Tobacco Use    Smoking status: Never     Passive exposure: Never    Smokeless tobacco: Never   Substance Use Topics    Alcohol use: Not Currently     Comment: Very rarely, 1 or 2 beers if i do.     Vitals:    24 1130   BP: 127/82   Pulse: (!) 127    Temp: 98.1 °F (36.7 °C)   SpO2: 99%       Physical Exam  General: NAD, alert  Breasts: Bilateral incisions are clean, dry, and intact. There is about 2cm of epidermolysis along medial right breast incision, appears to be superficial.  There is no evidence of hematoma or seroma formation.  There is no surrounding erythema, wound breakdown, drainage, or signs of infection.  Expected amount of postoperative edema and ecchymosis, within normal limits.  Bilateral drains intact and patent with serosanguineous output.       Assessment and Plan: 49 y.o.  year-old female s/p Right Breast Modified Radical Mastectomy Level I And Ii Lymph Node Dissection - Right, Left Mastectomy, Sheron Clips In The Right Breast/axillary Lymph Node And Left Breast - Left, and First Stage Bilateral Breast Reconstruction With Tissue Expanders And Adm - Bilateral       -Reviewed drain care with the patient today and how to properly strip drains.  Advised the patient that the drains are still putting out too much fluid to safely remove them.  -Patient educated on how to apply ointment, Xeroform, and ABD to right breast incision, advised the patient that we will have to keep a very close eye on this area and if it continues to worsen or not progress, we may have to discuss return to the OR for debridement.  I anticipate that this area of epidermolysis is superficial and it will heal over the next week or 2. Advised to call with any wound breakdown or drainage from breast.  -Advised the patient to continue antibiotics and no showering until all drains are removed.  No ice or heat to breast.  -Continue wearing surgical compression bra at all times. Patient is to refrain from exercise/repetitive arm movements for 4-6 weeks post-op. Sleep on back at an incline. No submerging in water (pools, baths, hottubs, etc.) until 8 weeks post-op.  -The patient is to return in 1 week   -The patient is to call the office with any questions or concerns. All of the  patient's questions were answered at this time and they agree with the plan of care.      Casey Booker PA-C  Plastic & Reconstructive Surgery

## 2024-09-30 NOTE — TELEPHONE ENCOUNTER
Spoke with patient and informed her of what SHADE Peña advised. Patient understands, no further questions.

## 2024-10-01 DIAGNOSIS — C50.511 MALIGNANT NEOPLASM OF LOWER-OUTER QUADRANT OF RIGHT BREAST OF FEMALE, ESTROGEN RECEPTOR POSITIVE (HCC): Primary | ICD-10-CM

## 2024-10-01 DIAGNOSIS — Z17.0 MALIGNANT NEOPLASM OF LOWER-OUTER QUADRANT OF RIGHT BREAST OF FEMALE, ESTROGEN RECEPTOR POSITIVE (HCC): Primary | ICD-10-CM

## 2024-10-01 NOTE — PROGRESS NOTES
[facility]astic and Reconstruct[facility]  [address], [address], [place] [address]  [phone]    Patient Identification: Stacey Villatoro is a 49 y.o. female     History of Present Illness: The patient is a 49 y.o.  year-old female  who presents to the office for drain check. Patient is 7 days s/p Right Breast Modified Radical Mastectomy Level I And Ii Lymph Node Dissection - Right, Left Mastectomy, Sheron Clips In The Right Breast/axillary Lymph Node And Left Breast - Left, and First Stage Bilateral Breast Reconstruction With Tissue Expanders And Adm - Bilateral  on 9/19/2024 by Dr. Archibald and Dr. Brink.  Patient presents today with concerns regarding breast drains. States one drain is leaking at the base. She denies fevers, chills, signs of infection or pain. Drains continue to output over 30cc daily.  Patient has no complaints at this time.    Past Medical History:   Diagnosis Date    Arthritis     BRCA1 negative     BRCA2 negative     Breast cancer (HCC)     Breast mass     Cancer (HCC)           Review of Systems  Constitutional: Denies fevers, chills or pain.  Skin: Denies any warmth, erythema, edema  or mucopurulent drainage.     Physical Exam    Breast: Surgical incisions are clean, dry, and intact. Skin perfusion is intact. Expected amount of post-operative edema. There are no signs of infection, obvious hematoma, seroma or wound dehiscence. Drains are patent bilaterally with sanguineous fluid in bulbs, reinforced at base.     Assessment and Plan:  The patient is an 49 y.o.  year-old female who presents to the office for drain check. Patient is 7 days s/p Right Breast Modified Radical Mastectomy Level I And Ii Lymph Node Dissection - Right, Left Mastectomy, Sheron Clips In The Right Breast/axillary Lymph Node And Left Breast - Left, and First Stage Bilateral Breast Reconstruction With Tissue Expanders And Adm - Bilateral  on 9/19/2024 by Dr. Archibald and Dr. Brink      -At today's visit  drain dressings were reinforced at the base. Suction intact to bilateral drains. All questions answered. Pt has no other concerns at this time.   -The patient is to return next week for drain check.  -The patient is to call the office with any questions or concerns. All of the patient's questions were answered at this time and they agree with the plan of care.      Paige Birmingham PA-C  [facility] Plastic and Reconstructive Surgery

## 2024-10-03 NOTE — TELEPHONE ENCOUNTER
Attempted to phone patient to review calli questions.  Left voice message that DEMI Payne from [place] office will be reaching out to her to schedule appt for calli.  Provided hopeline number for return call.

## 2024-10-04 NOTE — TELEPHONE ENCOUNTER
Attempted to call patient and left detailed voicemail regarding fitting for paxman. In message made her aware that she can come today if she had time and if not we can coordinate appt for next week for her to come and get fitted and obtain any other information that is needed. In message made her aware that I would also leave a my chart message. In message she is also made aware that she can continue to see Dr. Hyman and have her treatments at [place] or any location that has the [facility].  Hope line number was left to return our call at their earliest convenience.

## 2024-10-07 ENCOUNTER — OFFICE VISIT (OUTPATIENT)
Age: 49
End: 2024-10-07

## 2024-10-07 VITALS
HEART RATE: 115 BPM | OXYGEN SATURATION: 99 % | HEIGHT: 63 IN | SYSTOLIC BLOOD PRESSURE: 129 MMHG | WEIGHT: 147 LBS | TEMPERATURE: 98.5 F | DIASTOLIC BLOOD PRESSURE: 98 MMHG | BODY MASS INDEX: 26.05 KG/M2

## 2024-10-07 DIAGNOSIS — Z48.89 ENCOUNTER FOR FOLLOW-UP CARE INVOLVING PLASTIC SURGERY: Primary | ICD-10-CM

## 2024-10-07 PROBLEM — Z90.13 STATUS POST BILATERAL MASTECTOMY: Status: ACTIVE | Noted: 2024-10-07

## 2024-10-07 PROCEDURE — 99024 POSTOP FOLLOW-UP VISIT: CPT | Performed by: PHYSICIAN ASSISTANT

## 2024-10-07 NOTE — PROGRESS NOTES
Patient Identification: Stacey Villatoro is a 49 y.o. female     History of Present Illness: The patient is a 49 y.o.  year-old female  who presents to the office for post-op visit. Patient is 18 days s/p Bilateral first stage breast reconstruction and placement of submuscular [place] textured 750 mL Yoel tissue expanders, initial fill 100 mL, reinforcement of inferior pole with Flex HD acellular dermal matrix, bilateral pectoralis block with Exparel, right sided axillary advancement flap, 15 cm x 10 cm, placement of bilateral lori none DME negative pressure wound therapy dressings, less than 50 cm² each, total less than 50 cm² on 9/19/2024.    Pt is doing ok today, denies fevers, chills, sign of infection or significant pain. Recording drain outputs daily, bilateral drains are putting out less than 30 cc daily. She is taking abx and has not showered.  She has been applying antibiotic ointment, Xeroform, and ABDs to right breast daily, states that area of concern looks like it has improved.  Wearing bra and following activity restrictions.  She offers no complaints today.    The patient saw hematology/oncology on 9/26/2024, the patient would benefit from chemotherapy and radiation therapy.    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)    Status post bilateral breast reconstruction    Status post bilateral mastectomy       Current Outpatient Medications:     cephalexin (KEFLEX) 500 mg capsule, Take 1 capsule (500 mg total) by mouth 3 (three) times a day for 21 days, Disp: 63 capsule, Rfl: 0    cyclobenzaprine (FLEXERIL) 10 mg  tablet, Take 1 tablet (10 mg total) by mouth 3 (three) times a day for 7 days, Disp: 21 tablet, Rfl: 0    gabapentin (Neurontin) 300 mg capsule, Take 1 capsule (300 mg total) by mouth 3 (three) times a day for 7 days, Disp: 21 capsule, Rfl: 0    loratadine (CLARITIN) 10 mg tablet, Take 10 mg by mouth as needed for allergies, Disp: , Rfl:     oxyCODONE (Roxicodone) 5 immediate release tablet, Take 1 tablet (5 mg total) by mouth every 6 (six) hours as needed for severe pain Max Daily Amount: 20 mg, Disp: 10 tablet, Rfl: 0    enoxaparin (Lovenox) 40 mg/0.4 mL, Inject 0.4 mL (40 mg total) under the skin in the morning for 7 days, Disp: 2.8 mL, Rfl: 0    Past Surgical History:   Procedure Laterality Date    BREAST BIOPSY Right 2024     SECTION      x2    FOOT SURGERY      MAMMO NEEDLE LOCALIZATION LEFT (ALL INC) Left 9/3/2024    MRI BREAST BIOPSY LEFT (ALL INCLUSIVE) Left 2024    MRI BREAST BIOPSY RIGHT (ALL INCLUSIVE) Right 2024    MA MAST MODF RAD W/AX LYMPH NOD W/WO PECT/AMALIA MIN Right 2024    Procedure: RIGHT BREAST MODIFIED RADICAL MASTECTOMY LEVEL I AND II LYMPH NODE DISSECTION;  Surgeon: Mert Brink MD;  [facility];  Service: Surgical Oncology    MA MASTECTOMY SIMPLE COMPLETE Left 2024    Procedure: LEFT MASTECTOMY, SAMY CLIPS IN THE RIGHT BREAST/AXILLARY LYMPH NODE AND LEFT BREAST;  Surgeon: Mert Brink MD;  [facility];  Service: Surgical Oncology    MA TISSUE EXPANDER PLACEMENT BREAST RECONSTRUCTION Bilateral 2024    Procedure: FIRST STAGE BILATERAL BREAST RECONSTRUCTION WITH TISSUE EXPANDERS AND ADM;  Surgeon: Jonathan Archibald MD;  [facility]: [facility];  Service: Plastics    US GUIDED BREAST BIOPSY RIGHT COMPLETE Right 2024    US GUIDED BREAST LYMPH NODE BIOPSY RIGHT Right 2024     Social History     Tobacco Use    Smoking status: Never     Passive exposure: Never    Smokeless tobacco: Never   Substance Use  Topics    Alcohol use: Not Currently     Comment: Very rarely, 1 or 2 beers if i do.     Vitals:    10/07/24 1041   BP: 129/98   Pulse: (!) 115   Temp: 98.5 °F (36.9 °C)   SpO2: 99%       Physical Exam  General: NAD, alert  Breasts: Bilateral incisions are clean, dry, and intact. There is about 2cm of epidermolysis along medial right breast incision, appears to be superficial, this has improved.  No evidence of deep wounds, drainage, or exposed implant.  There is no evidence of hematoma or seroma formation.  There is no surrounding erythema, wound breakdown, drainage, or signs of infection.  Expected amount of postoperative edema and ecchymosis, within normal limits.  Bilateral drains intact and patent with serosanguineous output.  Bilateral drains are removed.      Assessment and Plan: 49 y.o.  year-old female s/p Right Breast Modified Radical Mastectomy Level I And Ii Lymph Node Dissection - Right, Left Mastectomy, Sheron Clips In The Right Breast/axillary Lymph Node And Left Breast - Left, and First Stage Bilateral Breast Reconstruction With Tissue Expanders And Adm - Bilateral       -Patient discussed with Dr. Archibald.  Will remove drains, but hold off on tissue expansions until right breast is completely healed.  Advised patient to continue course of antibiotics and avoid water to right chest.  -Patient educated on how to apply ointment, Xeroform, and ABD to right breast incision, advised the patient that we will have to keep a very close eye on this area and if it continues to worsen or not progress, we may have to discuss return to the OR for debridement.  I anticipate that this area of epidermolysis is superficial and it will heal over the next week or 2. Advised to call with any wound breakdown or drainage from breast.  -Continue wearing surgical compression bra at all times. Patient is to refrain from exercise/repetitive arm movements for 4-6 weeks post-op. Sleep on back at an incline. No submerging in water  (pools, baths, hottubs, etc.) until 8 weeks post-op.  -The patient is to return in 1 week.  -The patient is to call the office with any questions or concerns. All of the patient's questions were answered at this time and they agree with the plan of care.      Casey Booker PA-C  Plastic & Reconstructive Surgery

## 2024-10-09 ENCOUNTER — OFFICE VISIT (OUTPATIENT)
Dept: SURGICAL ONCOLOGY | Facility: CLINIC | Age: 49
End: 2024-10-09
Payer: COMMERCIAL

## 2024-10-09 VITALS
WEIGHT: 148.5 LBS | TEMPERATURE: 98.3 F | BODY MASS INDEX: 26.31 KG/M2 | HEIGHT: 63 IN | HEART RATE: 122 BPM | DIASTOLIC BLOOD PRESSURE: 100 MMHG | OXYGEN SATURATION: 99 % | SYSTOLIC BLOOD PRESSURE: 132 MMHG

## 2024-10-09 DIAGNOSIS — Z98.890 STATUS POST BILATERAL BREAST RECONSTRUCTION: ICD-10-CM

## 2024-10-09 DIAGNOSIS — Z17.0 MALIGNANT NEOPLASM OF LOWER-OUTER QUADRANT OF RIGHT BREAST OF FEMALE, ESTROGEN RECEPTOR POSITIVE (HCC): Primary | ICD-10-CM

## 2024-10-09 DIAGNOSIS — C50.511 MALIGNANT NEOPLASM OF LOWER-OUTER QUADRANT OF RIGHT BREAST OF FEMALE, ESTROGEN RECEPTOR POSITIVE (HCC): Primary | ICD-10-CM

## 2024-10-09 DIAGNOSIS — C50.911 CARCINOMA OF RIGHT BREAST METASTATIC TO AXILLARY LYMPH NODE (HCC): ICD-10-CM

## 2024-10-09 DIAGNOSIS — C77.3 CARCINOMA OF RIGHT BREAST METASTATIC TO AXILLARY LYMPH NODE (HCC): ICD-10-CM

## 2024-10-09 DIAGNOSIS — Z90.13 STATUS POST BILATERAL MASTECTOMY: ICD-10-CM

## 2024-10-09 PROCEDURE — 99244 OFF/OP CNSLTJ NEW/EST MOD 40: CPT | Performed by: SURGERY

## 2024-10-09 NOTE — PROGRESS NOTES
Surgical Oncology Follow Up  [facility]  [facility] [place]  [address] [address]    Stacey Villatoro  1975  [id number]      Chief Complaint   Patient presents with   • Post-op     Post-Op Double Mastectomy        Assessment & Plan:   49-year-old female s/p bilateral mastectomy with right axillary sentinel lymph node biopsy with immediate reconstruction she is overall doing well.  Pathology was reviewed and discussed in detail.  Need for adjuvant chemotherapy and radiation I reviewed and discussed in detail with her and her .  We will refer her to medical and radiation oncologist.    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 7 o'clock 7 cm from nipple  Invasive breast carcinoma of no special type (ductal NST/invasive ductal carcinoma)   Grade 2  ER 95  CT 61  HER2 1+  No lymphovascular invasion    B. Right axillary lymph node  Invasive ductal carcinoma of mammary origin, involving fibroadipose tissue, see comment.   Lymphoid tissue is not identified.    C. Right axillary lymph node  Invasive ductal carcinoma of mammary origin, involving fibroadipose tissue, see comment.   Lymphoid tissue is not identified.    Comment: Parts B and C are submitted as axillary lymph node, however no lymphoid tissue is identified. Carcinoma is present with expression of Jennifer-3 (performed on B/C) supporting the above diagnosis. However, clinical and radiographic correlation is advised as these findings may represent entire andria replacement by metastatic carcinoma  or direct extension of the tumor.     Right malignancy appears unifocal. The biopsy-proven carcinoma measured 0.6 cm on ultrasound. There is metastatic disease to a right axillary lymph node.  A second abnormal appearing lymph node is noted in the right axilla (not biopsied). Recent imaging of the contralateral left breast dated 6/18/2024 was reviewed and shows no suspicious findings.      7/18/2024 Genomic Testing    [facility]  A total of 59 genes were evaluated, including: APC, RICKY, BARD 1, BMPR1A, BRCA1, BRCA2, BRIP1, CDH1, CDK4, CDKN1B, CKDN2A, CHEK2, DICER1, FH, FLCN, KIF1B, MAX, MEN1, MET, MLH1, MSH2, MSH6, MUTYH, NBN, NF1, NTHL1, PALB2, PMS2, POT1, PTEN, RAD51C, RAD51D, RB1, RET, SDHA, SDHAF2, SDHB, SDHC, SDHD, SMAD4, SMARCA4, STK11, JCST930, TP53, TSC1, TSC2, VHL (sequencing and depletion/duplication); AXIN2, CTNNA1, EGLN1, HOXB13, KIT, MITF, MSH3, PDGFRA, POLD1, AND POLE (sequencing only); EPCAM, and GREM1 (depletion/duplication only)  Negative result. No pathogenic sequence variants or deletions/duplications identified     9/19/2024 -  Cancer Staged    Staging form: Breast, AJCC 8th Edition  - Pathologic stage from 9/19/2024: Stage IB (pT2, pN2a, cM0, G2, ER+, GA+, HER2-, Oncotype DX score: 20) - Signed by Ros Hyman MD on 9/26/2024  Stage prefix: Initial diagnosis  Multigene prognostic tests performed: Oncotype DX  Recurrence score range: Greater than or equal to 11  Histologic grading system: 3 grade system       9/19/2024 Surgery    Right modified radical mastectomy left simple mastectomy immediate reconstruction with Dr. Archibald  RIGHT  Invasive breast carcinoma of no special type (ductal NST / invasive ductal carcinoma)   34 mm  Separate tumor nodule, 11 mm, most compatible with completely replaced lymph node   Grade 2   Invasive carcinoma less than 1 mm from posterior margin   Subdermal invasion by tumor. Perineural invasion by tumor  5/12 Lymph nodes; positive for extranodal extension  ER 91  GA 61    HER2 1+  Anatomic stage: at least stage IIIA  Prognostic stage: IB    LEFT  Breast tissue with non-invasive lobular neoplasia (ALH / LCIS) and atypical ductal hyperplasia (ADH)      10/24/2024 -  Chemotherapy    alteplase (CATHFLO), 2 mg, Intracatheter, Every 1 Minute as needed, 0 of 4 cycles  pegfilgrastim-apgf (Nyvepria), 6 mg, Subcutaneous, Once, 0 of 4 cycles  cyclophosphamide (CYTOXAN) IVPB, 600 mg/m2 = 1,020 mg, Intravenous, Once, 0 of 4 cycles  DOCEtaxel (TAXOTERE) chemo infusion, 75 mg/m2 = 127.6 mg, Intravenous, Once, 0 of 4 cycles     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)   7/11/2024 Initial Diagnosis    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 No pathogenic sequence variants or deletions/duplications identified     9/19/2024 Surgery    Right modified radical mastectomy left simple mastectomy immediate reconstruction with Dr. Archibald  RIGHT  Invasive breast carcinoma of no special type (ductal NST / invasive ductal carcinoma)   34 mm  Separate tumor nodule, 11 mm, most compatible with completely replaced lymph node   Grade 2   Invasive carcinoma less than 1 mm from posterior margin   Subdermal invasion by tumor. Perineural invasion by tumor  5/12 Lymph nodes; positive for extranodal extension  ER 91  WA 61   HER2 1+  Anatomic stage: at least stage IIIA  Prognostic stage:  IB    LEFT  Breast tissue with non-invasive lobular neoplasia (ALH / LCIS) and atypical ductal hyperplasia (ADH)            Interval History:   Follow-up with right breast cancer    Review of Systems:   Review of Systems   Constitutional:  Negative for chills and fever.   HENT:  Negative for ear pain and sore throat.    Eyes:  Negative for pain and visual disturbance.   Respiratory:  Negative for cough and shortness of breath.    Cardiovascular:  Negative for chest pain and palpitations.   Gastrointestinal:  Negative for abdominal pain and vomiting.   Genitourinary:  Negative for dysuria and hematuria.   Musculoskeletal:  Negative for arthralgias and back pain.   Skin:  Negative for color change and rash.   Neurological:  Negative for seizures and syncope.   All other systems reviewed and are negative.      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)   • Status post bilateral breast reconstruction   • Status post bilateral mastectomy     Past Medical History:   Diagnosis Date   • Arthritis    • BRCA1 negative    • BRCA2 negative    • Breast cancer (HCC)    • Breast mass    • Cancer (HCC)      Past Surgical History:   Procedure Laterality Date   • BREAST BIOPSY Right 2024   •  SECTION      x2   • FOOT SURGERY     • MAMMO NEEDLE LOCALIZATION LEFT (ALL INC) Left 9/3/2024   • MRI BREAST BIOPSY LEFT (ALL INCLUSIVE) Left 2024   • MRI BREAST BIOPSY RIGHT (ALL INCLUSIVE) Right 2024   • FL MAST MODF RAD W/AX LYMPH NOD W/WO PECT/AMALIA MIN Right 2024    Procedure: RIGHT BREAST MODIFIED RADICAL MASTECTOMY LEVEL I AND II LYMPH NODE DISSECTION;  Surgeon: Mert Brink MD;  Location: [facility] OR;  Service: Surgical Oncology   • FL  MASTECTOMY SIMPLE COMPLETE Left 9/19/2024    Procedure: LEFT MASTECTOMY, SAMY CLIPS IN THE RIGHT BREAST/AXILLARY LYMPH NODE AND LEFT BREAST;  Surgeon: Mert Brink MD;  Location: [facility];  Service: Surgical Oncology   • NE TISSUE EXPANDER PLACEMENT BREAST RECONSTRUCTION Bilateral 9/19/2024    Procedure: FIRST STAGE BILATERAL BREAST RECONSTRUCTION WITH TISSUE EXPANDERS AND ADM;  Surgeon: Jonathan Archibald MD;  Location: [facility];  Service: Plastics   • US GUIDED BREAST BIOPSY RIGHT COMPLETE Right 7/11/2024   • US GUIDED BREAST LYMPH NODE BIOPSY RIGHT Right 7/11/2024     Family History   Problem Relation Age of Onset   • Dementia Mother    • Lung cancer Mother    • Heart disease Father    • Skin cancer Father    • Heart attack Father         AT THE AGE OF 60   • No Known Problems Son    • No Known Problems Daughter    • No Known Problems Maternal Grandmother    • No Known Problems Maternal Grandfather    • No Known Problems Paternal Grandmother    • No Known Problems Paternal Grandfather    • Breast cancer Neg Hx      Social History     Socioeconomic History   • Marital status: /Civil Union     Spouse name: Not on file   • Number of children: Not on file   • Years of education: Not on file   • Highest education level: Not on file   Occupational History   • Not on file   Tobacco Use   • Smoking status: Never     Passive exposure: Never   • Smokeless tobacco: Never   Vaping Use   • Vaping status: Never Used   Substance and Sexual Activity   • Alcohol use: Not Currently     Comment: Very rarely, 1 or 2 beers if i do.   • Drug use: No   • Sexual activity: Yes     Partners: Male     Comment: Tubes tied 16 years ago   Other Topics Concern   • Not on file   Social History Narrative    Lives with  and kids     Work for [employer]      Social Determinants of Health     Financial Resource Strain: Not on file   Food Insecurity: Not on file   Transportation Needs: Not on file   Physical Activity:  Not on file   Stress: Not on file   Social Connections: Not on file   Intimate Partner Violence: Not on file   Housing Stability: Not on file       Current Outpatient Medications:   •  cephalexin (KEFLEX) 500 mg capsule, Take 1 capsule (500 mg total) by mouth 3 (three) times a day for 21 days, Disp: 63 capsule, Rfl: 0  •  cyclobenzaprine (FLEXERIL) 10 mg tablet, Take 1 tablet (10 mg total) by mouth 3 (three) times a day for 7 days, Disp: 21 tablet, Rfl: 0  •  gabapentin (Neurontin) 300 mg capsule, Take 1 capsule (300 mg total) by mouth 3 (three) times a day for 7 days, Disp: 21 capsule, Rfl: 0  •  loratadine (CLARITIN) 10 mg tablet, Take 10 mg by mouth as needed for allergies, Disp: , Rfl:   •  oxyCODONE (Roxicodone) 5 immediate release tablet, Take 1 tablet (5 mg total) by mouth every 6 (six) hours as needed for severe pain Max Daily Amount: 20 mg, Disp: 10 tablet, Rfl: 0  •  enoxaparin (Lovenox) 40 mg/0.4 mL, Inject 0.4 mL (40 mg total) under the skin in the morning for 7 days (Patient not taking: Reported on 10/9/2024), Disp: 2.8 mL, Rfl: 0  No Known Allergies    Physical Exam:     Vitals:    10/09/24 0948   BP: 132/100   Pulse: (!) 122   Temp: 98.3 °F (36.8 °C)   SpO2: 99%     Physical Exam  Constitutional:       Appearance: Normal appearance.   HENT:      Head: Normocephalic and atraumatic.      Nose: Nose normal.      Mouth/Throat:      Mouth: Mucous membranes are moist.   Eyes:      Pupils: Pupils are equal, round, and reactive to light.   Cardiovascular:      Rate and Rhythm: Normal rate.      Pulses: Normal pulses.      Heart sounds: Normal heart sounds.   Pulmonary:      Effort: Pulmonary effort is normal.      Breath sounds: Normal breath sounds.   Chest:          Comments: Bilateral mastectomy flaps are clean intact tissue expanders are in place drains have been removed  Abdominal:      General: Bowel sounds are normal.      Palpations: Abdomen is soft.   Musculoskeletal:         General: Normal range of  motion.      Cervical back: Normal range of motion and neck supple.   Skin:     General: Skin is warm.   Neurological:      General: No focal deficit present.      Mental Status: She is alert and oriented to person, place, and time.   Psychiatric:         Mood and Affect: Mood normal.         Behavior: Behavior normal.         Thought Content: Thought content normal.         Judgment: Judgment normal.         Results & Discussion:   A.  Right breast (modified radical mastectomy):     - Invasive breast carcinoma of no special type (ductal NST / invasive ductal carcinoma).      - Tumor size: 34 mm.  Tumor rdgrdrrdarddrderd:rd rd3rd of 3.      - Invasive carcinoma less than 1 mm from posterior margin.      - Separate tumor nodule, 11 mm, most compatible with completely replaced lymph node.     - One (1) additional lymph node, negative for carcinoma.      B.  Levels 1-2, right axilla (dissection):     - Metastatic mammary carcinoma involving four (4) of ten (10) lymph nodes.      - Tumor deposits measure from 7-20 mm; positive for extranodal extension.     C.  Left breast (mastectomy):     - Breast tissue with non-invasive lobular neoplasia (ALH / LCIS) and atypical ductal hyperplasia (ADH).     - Negative for invasive carcinoma.     D.  New superior margin, right breast (excision):     - Benign skin and subcutaneous tissue.    I did review pathology in detail and lymph node status total of 5 lymph nodes positive out of 11.  Original tumor is 34 mm margins negative however very close to posterior margin understandably.  Of note we have carved additional muscle fibers at the area of the tumor.  I did discussed in detail nature of breast cancer need for adjuvant therapy were reviewed and discussed she understands and  agrees . All patient questions were answered.       Advance Care Planning/Advance Directives:  I Mert Brink MD discussed the disease status with Stacey Villatoro  today 10/09/24  treatment plans and follow-up  with the patient.

## 2024-10-09 NOTE — PROGRESS NOTES
Patient and spouse were provided education on [facility]. Booklets including Paxman guide, [facility]man getting started and Paxman introduction were all provided. Discussed all paperwork that was started from Dr. Hyman  and finished filling out form with her. She is encouraged to reach out to [employer] for assistance with funding.  Fitting was done and SMALL cap was the better fit as the medium cap seemed to pop up and be over her forehead more. She was encouraged to go into the paxman site and view all videos. She was slightly confused if radiation and chemo were to be done same time or chemo was before the radiation. She was in office seeing Dr. Brink and she is agreeable to chemotherapy, she wanted to speak to provider first before moving forward.  Informed I would make Dr. Hyman's nurses aware and will relay concerns so they can be clarified.

## 2024-10-10 ENCOUNTER — TELEPHONE (OUTPATIENT)
Dept: HEMATOLOGY ONCOLOGY | Facility: CLINIC | Age: 49
End: 2024-10-10

## 2024-10-10 NOTE — TELEPHONE ENCOUNTER
Please reach out to patient to schedule chemotherapy.  Patient will be utilizing [facility].

## 2024-10-11 NOTE — TELEPHONE ENCOUNTER
Emy waiting for an answer from Dr. Hyman, for now I scheduled pts first tx on 10/31 at [facility] to hold spot.

## 2024-10-14 ENCOUNTER — OFFICE VISIT (OUTPATIENT)
Age: 49
End: 2024-10-14

## 2024-10-14 VITALS
WEIGHT: 148 LBS | HEART RATE: 102 BPM | HEIGHT: 63 IN | OXYGEN SATURATION: 98 % | BODY MASS INDEX: 26.22 KG/M2 | DIASTOLIC BLOOD PRESSURE: 91 MMHG | SYSTOLIC BLOOD PRESSURE: 121 MMHG | TEMPERATURE: 98.2 F

## 2024-10-14 DIAGNOSIS — Z17.0 MALIGNANT NEOPLASM OF LOWER-OUTER QUADRANT OF RIGHT BREAST OF FEMALE, ESTROGEN RECEPTOR POSITIVE (HCC): Primary | ICD-10-CM

## 2024-10-14 DIAGNOSIS — Z48.89 ENCOUNTER FOR FOLLOW-UP CARE INVOLVING PLASTIC SURGERY: Primary | ICD-10-CM

## 2024-10-14 DIAGNOSIS — C50.511 MALIGNANT NEOPLASM OF LOWER-OUTER QUADRANT OF RIGHT BREAST OF FEMALE, ESTROGEN RECEPTOR POSITIVE (HCC): Primary | ICD-10-CM

## 2024-10-14 PROCEDURE — 99024 POSTOP FOLLOW-UP VISIT: CPT | Performed by: PHYSICIAN ASSISTANT

## 2024-10-14 RX ORDER — CEPHALEXIN 500 MG/1
500 CAPSULE ORAL 3 TIMES DAILY
Qty: 21 CAPSULE | Refills: 0 | Status: SHIPPED | OUTPATIENT
Start: 2024-10-14 | End: 2024-10-21

## 2024-10-14 NOTE — PROGRESS NOTES
Patient Identification: Stacey Villatoro is a 49 y.o. female     History of Present Illness: The patient is a 49 y.o.  year-old female  who presents to the office for post-op visit. Patient is 25 days s/p Bilateral first stage breast reconstruction and placement of submuscular [place] textured 750 mL Yoel tissue expanders, initial fill 100 mL, reinforcement of inferior pole with Flex HD acellular dermal matrix, bilateral pectoralis block with Exparel, right sided axillary advancement flap, 15 cm x 10 cm, placement of bilateral lori none DME negative pressure wound therapy dressings, less than 50 cm² each, total less than 50 cm² on 9/19/2024.    Pt is doing well today, denies fevers, chills, drainage, sign of infection or significant pain. She is taking abx and has not showered.  She finished abx 2 days ago. She has been applying antibiotic ointment, Xeroform, and ABDs to right breast daily, states that area of concern looks like it has improved.  Wearing bra and following activity restrictions.  She offers no complaints today.    The patient saw hematology/oncology on 9/26/2024, the patient would benefit from chemotherapy and radiation therapy.    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)    Status post bilateral breast reconstruction    Status post bilateral mastectomy       Current Outpatient Medications:     cephalexin (KEFLEX) 500 mg capsule, Take 1 capsule (500 mg total) by mouth 3 (three) times a day for 7 days, Disp: 21 capsule, Rfl: 0    cyclobenzaprine (FLEXERIL) 10 mg tablet, Take 1 tablet (10 mg total) by mouth 3  (three) times a day for 7 days, Disp: 21 tablet, Rfl: 0    enoxaparin (Lovenox) 40 mg/0.4 mL, Inject 0.4 mL (40 mg total) under the skin in the morning for 7 days (Patient not taking: Reported on 10/9/2024), Disp: 2.8 mL, Rfl: 0    gabapentin (Neurontin) 300 mg capsule, Take 1 capsule (300 mg total) by mouth 3 (three) times a day for 7 days, Disp: 21 capsule, Rfl: 0    loratadine (CLARITIN) 10 mg tablet, Take 10 mg by mouth as needed for allergies (Patient not taking: Reported on 10/14/2024), Disp: , Rfl:     oxyCODONE (Roxicodone) 5 immediate release tablet, Take 1 tablet (5 mg total) by mouth every 6 (six) hours as needed for severe pain Max Daily Amount: 20 mg (Patient not taking: Reported on 10/14/2024), Disp: 10 tablet, Rfl: 0    Past Surgical History:   Procedure Laterality Date    BREAST BIOPSY Right 2024     SECTION      x2    FOOT SURGERY      MAMMO NEEDLE LOCALIZATION LEFT (ALL INC) Left 9/3/2024    MRI BREAST BIOPSY LEFT (ALL INCLUSIVE) Left 2024    MRI BREAST BIOPSY RIGHT (ALL INCLUSIVE) Right 2024    KS MAST MODF RAD W/AX LYMPH NOD W/WO PECT/AMALIA MIN Right 2024    Procedure: RIGHT BREAST MODIFIED RADICAL MASTECTOMY LEVEL I AND II LYMPH NODE DISSECTION;  Surgeon: Mert Brink MD;  Location: [facility];  Service: Surgical Oncology    KS MASTECTOMY SIMPLE COMPLETE Left 2024    Procedure: LEFT MASTECTOMY, SAMY CLIPS IN THE RIGHT BREAST/AXILLARY LYMPH NODE AND LEFT BREAST;  Surgeon: Mert Brink MD;  Location: [facility];  Service: Surgical Oncology    KS TISSUE EXPANDER PLACEMENT BREAST RECONSTRUCTION Bilateral 2024    Procedure: FIRST STAGE BILATERAL BREAST RECONSTRUCTION WITH TISSUE EXPANDERS AND ADM;  Surgeon: Jonathan Archibald MD;  Location: [facility];  Service: Plastics    US GUIDED BREAST BIOPSY RIGHT COMPLETE Right 2024    US GUIDED BREAST LYMPH NODE BIOPSY RIGHT Right 2024     Social History     Tobacco Use    Smoking  status: Never     Passive exposure: Never    Smokeless tobacco: Never   Substance Use Topics    Alcohol use: Not Currently     Comment: Very rarely, 1 or 2 beers if i do.     Vitals:    10/14/24 1124   BP: 121/91   Pulse: 102   Temp: 98.2 °F (36.8 °C)   SpO2: 98%       Physical Exam  General: NAD, alert  Breasts: Bilateral incisions are clean, dry, and intact. Some scabbing along right medial breast incision, this is superficial with healthy underlying tissue. No evidence of deep wounds, drainage, or exposed implant.  There is no evidence of hematoma or seroma formation.  There is no surrounding erythema, wound breakdown, drainage, or signs of infection.  Expected amount of postoperative edema.      Assessment and Plan: 49 y.o.  year-old female s/p Right Breast Modified Radical Mastectomy Level I And Ii Lymph Node Dissection - Right, Left Mastectomy, Sheron Clips In The Right Breast/axillary Lymph Node And Left Breast - Left, and First Stage Bilateral Breast Reconstruction With Tissue Expanders And Adm - Bilateral     -Right breast significantly improved without evidence of erythema, drainage, or deep wounds.  -Hold off on tissue expansions until right breast is completely healed.   -Advised patient to continue course of antibiotics and avoid water to right chest.  -Patient educated on how to apply ointment, Xeroform, and ABD to right breast incision, advised the patient that we will have to keep a very close eye on this area and if it continues to worsen or not progress, we may have to discuss return to the OR for debridement.  I anticipate that this area of epidermolysis is superficial and it will heal over the next week or 2. Advised to call with any wound breakdown or drainage from breast.  -Continue wearing surgical compression bra at all times. Patient is to refrain from exercise/repetitive arm movements for 4-6 weeks post-op. No submerging in water.  -The patient is to return in 1 week.  -Abx refilled.   -The  patient is to call the office with any questions or concerns. All of the patient's questions were answered at this time and they agree with the plan of care.      Casey Booker PA-C  Plastic & Reconstructive Surgery       No

## 2024-10-21 ENCOUNTER — OFFICE VISIT (OUTPATIENT)
Age: 49
End: 2024-10-21

## 2024-10-21 ENCOUNTER — TELEPHONE (OUTPATIENT)
Age: 49
End: 2024-10-21

## 2024-10-21 VITALS
OXYGEN SATURATION: 98 % | SYSTOLIC BLOOD PRESSURE: 121 MMHG | WEIGHT: 148 LBS | TEMPERATURE: 98.1 F | HEART RATE: 116 BPM | HEIGHT: 63 IN | DIASTOLIC BLOOD PRESSURE: 93 MMHG | BODY MASS INDEX: 26.22 KG/M2

## 2024-10-21 DIAGNOSIS — C50.511 MALIGNANT NEOPLASM OF LOWER-OUTER QUADRANT OF RIGHT BREAST OF FEMALE, ESTROGEN RECEPTOR POSITIVE (HCC): Primary | ICD-10-CM

## 2024-10-21 DIAGNOSIS — Z48.89 ENCOUNTER FOR FOLLOW-UP CARE INVOLVING PLASTIC SURGERY: Primary | ICD-10-CM

## 2024-10-21 DIAGNOSIS — Z17.0 MALIGNANT NEOPLASM OF LOWER-OUTER QUADRANT OF RIGHT BREAST OF FEMALE, ESTROGEN RECEPTOR POSITIVE (HCC): Primary | ICD-10-CM

## 2024-10-21 DIAGNOSIS — Z51.11 ENCOUNTER FOR CHEMOTHERAPY MANAGEMENT: Primary | ICD-10-CM

## 2024-10-21 PROCEDURE — 99024 POSTOP FOLLOW-UP VISIT: CPT | Performed by: PHYSICIAN ASSISTANT

## 2024-10-21 RX ORDER — DEXAMETHASONE 4 MG/1
8 TABLET ORAL 2 TIMES DAILY WITH MEALS
Qty: 12 TABLET | Refills: 3 | Status: SHIPPED | OUTPATIENT
Start: 2024-10-21

## 2024-10-21 RX ORDER — ONDANSETRON 8 MG/1
8 TABLET, FILM COATED ORAL EVERY 8 HOURS PRN
Qty: 20 TABLET | Refills: 3 | Status: SHIPPED | OUTPATIENT
Start: 2024-10-21

## 2024-10-21 NOTE — TELEPHONE ENCOUNTER
Patient calling in to speak with Emy or Maggie. Patient denied CTS. Did not disclose information regarding the call.

## 2024-10-21 NOTE — TELEPHONE ENCOUNTER
Returned a call to pt and she asked about the Rx Dexamethasone and I let her know that I would send the Rx to her pharmacy and she needs to take two tabs po BID for 3 days and start the day before chemo day of chemo and day after chemo. I will also send in Rx for Zofran. Pt asked about port or picc line and I did let her know that pt is only having forur cycles and we normally place order for Picc line. Pt wishes to have peripheral IV, I let her know that if there were any issues obtaining IV access a picc order could be placed. Pt has dome questions about Rad Tx and she has a Consult on Wed so I suggested she address these questions at that appt.

## 2024-10-21 NOTE — PROGRESS NOTES
Patient Identification: Stacey Villatoro is a 49 y.o. female     History of Present Illness: The patient is a 49 y.o.  year-old female  who presents to the office for post-op visit. Patient is 32 days s/p Bilateral first stage breast reconstruction and placement of submuscular [place] textured 750 mL Yoel tissue expanders, initial fill 100 mL, reinforcement of inferior pole with Flex HD acellular dermal matrix, bilateral pectoralis block with Exparel, right sided axillary advancement flap, 15 cm x 10 cm, placement of bilateral lori none DME negative pressure wound therapy dressings, less than 50 cm² each, total less than 50 cm² on 9/19/2024.    Pt is doing well today, denies fevers, chills, drainage, sign of infection or significant pain. She is taking abx and has not showered. She has been applying antibiotic ointment, Xeroform, and ABDs to right breast daily, states that area of concern looks like it has improved.  Wearing bra and following activity restrictions.  She offers no complaints today.    The patient saw hematology/oncology on 9/26/2024, the patient would benefit from chemotherapy and radiation therapy. States she is starting chemo on 10/31.    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)    Status post bilateral breast reconstruction    Status post bilateral mastectomy       Current Outpatient Medications:     cephalexin (KEFLEX) 500 mg capsule, Take 1 capsule (500 mg total) by mouth 3 (three) times a day for 7 days, Disp: 21 capsule, Rfl: 0    cyclobenzaprine (FLEXERIL) 10 mg tablet, Take 1 tablet (10 mg total) by mouth  3 (three) times a day for 7 days, Disp: 21 tablet, Rfl: 0    enoxaparin (Lovenox) 40 mg/0.4 mL, Inject 0.4 mL (40 mg total) under the skin in the morning for 7 days (Patient not taking: Reported on 10/9/2024), Disp: 2.8 mL, Rfl: 0    gabapentin (Neurontin) 300 mg capsule, Take 1 capsule (300 mg total) by mouth 3 (three) times a day for 7 days, Disp: 21 capsule, Rfl: 0    loratadine (CLARITIN) 10 mg tablet, Take 10 mg by mouth as needed for allergies (Patient not taking: Reported on 10/14/2024), Disp: , Rfl:     oxyCODONE (Roxicodone) 5 immediate release tablet, Take 1 tablet (5 mg total) by mouth every 6 (six) hours as needed for severe pain Max Daily Amount: 20 mg (Patient not taking: Reported on 10/14/2024), Disp: 10 tablet, Rfl: 0    Past Surgical History:   Procedure Laterality Date    BREAST BIOPSY Right 2024     SECTION      x2    FOOT SURGERY      MAMMO NEEDLE LOCALIZATION LEFT (ALL INC) Left 9/3/2024    MRI BREAST BIOPSY LEFT (ALL INCLUSIVE) Left 2024    MRI BREAST BIOPSY RIGHT (ALL INCLUSIVE) Right 2024    LA MAST MODF RAD W/AX LYMPH NOD W/WO PECT/AMALIA MIN Right 2024    Procedure: RIGHT BREAST MODIFIED RADICAL MASTECTOMY LEVEL I AND II LYMPH NODE DISSECTION;  Surgeon: Mert Brink MD;  Location: [facility];  Service: Surgical Oncology    LA MASTECTOMY SIMPLE COMPLETE Left 2024    Procedure: LEFT MASTECTOMY, SAMY CLIPS IN THE RIGHT BREAST/AXILLARY LYMPH NODE AND LEFT BREAST;  Surgeon: Mert Brink MD;  Location: [facility];  Service: Surgical Oncology    LA TISSUE EXPANDER PLACEMENT BREAST RECONSTRUCTION Bilateral 2024    Procedure: FIRST STAGE BILATERAL BREAST RECONSTRUCTION WITH TISSUE EXPANDERS AND ADM;  Surgeon: Jonathan Archibald MD;  Location: [facility];  Service: Plastics    US GUIDED BREAST BIOPSY RIGHT COMPLETE Right 2024    US GUIDED BREAST LYMPH NODE BIOPSY RIGHT Right 2024     Social History     Tobacco Use    Smoking  status: Never     Passive exposure: Never    Smokeless tobacco: Never   Substance Use Topics    Alcohol use: Not Currently     Comment: Very rarely, 1 or 2 beers if i do.     Vitals:    10/21/24 0956   BP: 121/93   Pulse: (!) 116   Temp: 98.1 °F (36.7 °C)   SpO2: 98%         Physical Exam  General: NAD, alert  Breasts: Bilateral incisions are clean, dry, and intact. Some scabbing along right medial breast incision, this is superficial. No evidence of deep wounds, drainage, or exposed implant.  There is no evidence of hematoma or seroma formation.  There is no surrounding erythema, wound breakdown, drainage, or signs of infection.  Expected amount of postoperative edema.      Assessment and Plan: 49 y.o.  year-old female s/p Right Breast Modified Radical Mastectomy Level I And Ii Lymph Node Dissection - Right, Left Mastectomy, Sheron Clips In The Right Breast/axillary Lymph Node And Left Breast - Left, and First Stage Bilateral Breast Reconstruction With Tissue Expanders And Adm - Bilateral       -Right breast significantly improved without evidence of erythema, drainage, or deep wounds.  -Hold off on tissue expansions until right breast is fully healed and all scabbing resolved. Possibly start fills next week?  -Recommend vaseline/aquaphor to breast incisions daily.   -Continue wearing surgical compression bra at all times. Patient may begin to ease back into regular activity.  -Patient not interested in PT at this time.  -The patient is to return in 1 week.  -The patient is to call the office with any questions or concerns. All of the patient's questions were answered at this time and they agree with the plan of care.      Casey Booker PA-C  Plastic & Reconstructive Surgery

## 2024-10-23 ENCOUNTER — CONSULT (OUTPATIENT)
Dept: RADIATION ONCOLOGY | Facility: HOSPITAL | Age: 49
End: 2024-10-23
Attending: SURGERY
Payer: COMMERCIAL

## 2024-10-23 VITALS
HEART RATE: 110 BPM | SYSTOLIC BLOOD PRESSURE: 138 MMHG | RESPIRATION RATE: 18 BRPM | DIASTOLIC BLOOD PRESSURE: 90 MMHG | TEMPERATURE: 98.7 F | OXYGEN SATURATION: 99 %

## 2024-10-23 DIAGNOSIS — C77.3 CARCINOMA OF RIGHT BREAST METASTATIC TO AXILLARY LYMPH NODE (HCC): ICD-10-CM

## 2024-10-23 DIAGNOSIS — Z17.0 MALIGNANT NEOPLASM OF LOWER-OUTER QUADRANT OF RIGHT BREAST OF FEMALE, ESTROGEN RECEPTOR POSITIVE (HCC): ICD-10-CM

## 2024-10-23 DIAGNOSIS — C50.911 CARCINOMA OF RIGHT BREAST METASTATIC TO AXILLARY LYMPH NODE (HCC): ICD-10-CM

## 2024-10-23 DIAGNOSIS — C50.511 MALIGNANT NEOPLASM OF LOWER-OUTER QUADRANT OF RIGHT BREAST OF FEMALE, ESTROGEN RECEPTOR POSITIVE (HCC): ICD-10-CM

## 2024-10-23 PROCEDURE — 99205 OFFICE O/P NEW HI 60 MIN: CPT | Performed by: RADIOLOGY

## 2024-10-23 PROCEDURE — 99211 OFF/OP EST MAY X REQ PHY/QHP: CPT | Performed by: RADIOLOGY

## 2024-10-23 PROCEDURE — G0463 HOSPITAL OUTPT CLINIC VISIT: HCPCS | Performed by: RADIOLOGY

## 2024-10-23 NOTE — PROGRESS NOTES
Stacey Villatoro  1975  [id number]    Radiation Oncology Consult    Assessment and plan:  Malignant neoplasm of lower-outer quadrant of right breast of female, estrogen receptor positive (HCC)     Ms. Villatoro is a 49-year-old premenopausal woman who had routine screening mammograms that showed abnormality in the right breast and axilla, further imaging and biopsy confirming a clinical stage T2 cancer in the breast with multiple positive axillary nodes seen on imaging, biopsy-proven grade 2 invasive ductal carcinoma that is strongly estrogen and progesterone receptor positive and HER2 negative.  Oncotype recurrence score is 20.  She has undergone bilateral mastectomy with right axillary dissection and bilateral tissue expander placement.  Final pathology shows a 3.4 cm grade 2 invasive ductal carcinoma with negative but close posterior margin of excision, extensive lymphovascular invasion and 5 of 12 axillary nodes with macrometastases and extranodal extension. Pathologic stage pT2 pN2a cM0, anatomic stage IIIA, prognostic stage IB.  She will be receiving adjuvant chemotherapy between late October and January 2025.  This will be followed by adjuvant endocrine therapy.    I explained to the patient and her  that radiation is used to eradicate microscopic deposits of disease which may reside in the tissues after surgery and chemotherapy.  Radiation therefore reduces the risk of local regional recurrence and contributes to overall survival benefit.  Recommendations regarding the use of postmastectomy radiation are dependent upon the presence of clinical pathologic risk factors for local regional recurrence.  In her case these risk factors include primarily a large number of positive axillary nodes with macrometastases, the largest measuring 2 cm with extranodal extension, the presence of extensive lymphovascular invasion in the breast and the close posterior margin.  These risk factors place her at high risk  for local regional recurrence and strongly indicate a recommendation for postmastectomy radiation to the right chest wall and comprehensive regional nodes.  I described the procedure involved to radiate the right chest wall and regional nodes.  I described the potential acute and long-term side effects.  Acutely she may experience localized skin irritation, swelling in any soft tissues, fatigue or pneumonitis.  Late effects can include soft tissue fibrosis, necessitating the delay of further reconstruction surgeries.  Patient and her  asked a number pertinent questions which were answered to their satisfaction.  She was in agreement with proceeding with postmastectomy radiation.  She is scheduled for follow-up and CT simulation on January 15, 2025.      History of present illness:  Ms. Villatoro is a 49-year-old premenopausal woman who had routine screening mammograms on June 18, 2024.  This study showed 2 adjacent irregular masses in the central region of the right breast with at least 2 prominent right axillary lymph nodes, no findings on the left.  She underwent diagnostic imaging on July 11, 2024 which showed an irregular mass in the lower central right breast measuring 3.5 cm with spiculated margins, 7 o'clock position 7 cm from the nipple and again 2 abnormal appearing lymph nodes in the right axilla, BI-RADS Category 5.  Biopsy of the right breast and lymph node on that date showed grade 2 invasive ductal carcinoma that was estrogen receptor 95%, progesterone receptor 70% and HER2 negative.  Axillary biopsy was positive for metastatic invasive ductal carcinoma.  On July 18 she had a CT of the chest, abdomen and pelvis that showed no sign of metastatic disease.  Bone scan on July 24 showed no osseous metastases.  She had bilateral breast MRI on July 19, 2024 that showed an irregular spiculated mass in the lower outer right breast 7 o'clock position measuring 3.4 cm, non-mass enhancement measuring 1.8 cm  in a linear distribution in the outer central right breast, an irregular mass with internal enhancement in the upper central right breast at 12:00 and at least 4 enlarged right level 1 axillary lymph nodes with no internal mammary adenopathy.  In the left breast there was non-mass enhancement in a focal distribution at the 7 o'clock position.  She underwent additional biopsies including an MRI guided biopsy of left breast abnormality on August 9, pathology showed ALH and LCIS.  Biopsies on August 16, 2024 of the right breast 12 o'clock position showed dense stromal fibrosis with no malignancy and from the 10 o'clock position showed columnar cell hyperplasia with dense fibrosis, no malignancy.  Genetic testing showed no pathogenic variants.  She underwent bilateral mastectomy with right axillary dissection and immediate tissue expander placement by Dr. Brink and Dr. Archibald on September 19, 2024.  She is doing well postoperatively with no specific complications.  Pathology from the left breast showed ALH, LCIS and ADH with no invasive carcinoma.  Pathology from the right breast showed a 3.4 cm grade 2 invasive ductal carcinoma with DCIS present, extensive lymphovascular invasion present, margins negative but less than 1 mm from the posterior margin and 5 of 12 axillary nodes with macrometastases, the largest measuring 2 cm with extranodal extension.  Oncotype recurrence score was 20.  Pathologic stage pT2 pN2a cM0, anatomic stage IIIA, prognostic stage IB.  She was seen in consultation by Dr. Hyman in medical oncology who recommended adjuvant chemotherapy despite the intermediate risk Oncotype recurrence score due to the high burden of andria metastasis.  She is scheduled to begin systemic chemotherapy with Taxotere and cyclophosphamide on October 31 with an anticipated completion date of January 2, 2025.  She is referred by Dr. Brink for information regarding the role of postmastectomy radiation therapy in the  setting of node positive right breast cancer.      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 7 o'clock 7 cm from nipple  Invasive breast carcinoma of no special type (ductal NST/invasive ductal carcinoma)   Grade 2  ER 95  RI 61  HER2 1+  No lymphovascular invasion    B. Right axillary lymph node  Invasive ductal carcinoma of mammary origin, involving fibroadipose tissue, see comment.   Lymphoid tissue is not identified.    C. Right axillary lymph node  Invasive ductal carcinoma of mammary origin, involving fibroadipose tissue, see comment.   Lymphoid tissue is not identified.    Comment: Parts B and C are submitted as axillary lymph node, however no lymphoid tissue is identified. Carcinoma is present with expression of Jennifer-3 (performed on B/C) supporting the above diagnosis. However, clinical and radiographic correlation is advised as these findings may represent entire andria replacement by metastatic carcinoma or direct extension of the tumor.     Right malignancy appears unifocal. The biopsy-proven carcinoma measured 0.6 cm on ultrasound. There is metastatic disease to a right axillary lymph node.  A second abnormal appearing lymph node is noted in the right axilla (not biopsied). Recent imaging of the contralateral left breast dated 6/18/2024 was reviewed and shows no suspicious findings.      7/18/2024 Genomic Testing    [facility]  A total of 59 genes were evaluated, including: APC, RICKY, BARD 1, BMPR1A, BRCA1, BRCA2, BRIP1, CDH1, CDK4, CDKN1B, CKDN2A, CHEK2, DICER1, FH, FLCN, KIF1B, MAX, MEN1, MET, MLH1, MSH2, MSH6, MUTYH, NBN, NF1,  NTHL1, PALB2, PMS2, POT1, PTEN, RAD51C, RAD51D, RB1, RET, SDHA, SDHAF2, SDHB, SDHC, SDHD, SMAD4, SMARCA4, STK11, XFBX503, TP53, TSC1, TSC2, VHL (sequencing and depletion/duplication); AXIN2, CTNNA1, EGLN1, HOXB13, KIT, MITF, MSH3, PDGFRA, POLD1, AND POLE (sequencing only); EPCAM, and GREM1 (depletion/duplication only)  Negative result. No pathogenic sequence variants or deletions/duplications identified     9/19/2024 -  Cancer Staged    Staging form: Breast, AJCC 8th Edition  - Pathologic stage from 9/19/2024: Stage IB (pT2, pN2a, cM0, G2, ER+, RI+, HER2-, Oncotype DX score: 20) - Signed by Ros Hyman MD on 9/26/2024  Stage prefix: Initial diagnosis  Multigene prognostic tests performed: Oncotype DX  Recurrence score range: Greater than or equal to 11  Histologic grading system: 3 grade system       9/19/2024 Surgery    Right modified radical mastectomy left simple mastectomy immediate reconstruction with Dr. Archibald  RIGHT  Invasive breast carcinoma of no special type (ductal NST / invasive ductal carcinoma)   34 mm  Separate tumor nodule, 11 mm, most compatible with completely replaced lymph node   Grade 2   Invasive carcinoma less than 1 mm from posterior margin   Subdermal invasion by tumor. Perineural invasion by tumor  5/12 Lymph nodes; positive for extranodal extension  ER 91  RI 61   HER2 1+  Anatomic stage: at least stage IIIA  Prognostic stage: IB    LEFT  Breast tissue with non-invasive lobular neoplasia (ALH / LCIS) and atypical ductal hyperplasia (ADH)      10/31/2024 -  Chemotherapy    alteplase (CATHFLO), 2 mg, Intracatheter, Every 1 Minute as needed, 0 of 4 cycles  pegfilgrastim (NEULASTA ONPRO), 6 mg, Subcutaneous, Once, 0 of 4 cycles  cyclophosphamide (CYTOXAN) IVPB, 600 mg/m2 = 1,020 mg, Intravenous, Once, 0 of 4 cycles  DOCEtaxel (TAXOTERE) chemo infusion, 75 mg/m2 = 127.6 mg, Intravenous, Once, 0 of 4 cycles     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)   7/11/2024  Initial Diagnosis    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 No pathogenic sequence variants or deletions/duplications identified     9/19/2024 Surgery    Right modified radical mastectomy left simple mastectomy immediate reconstruction with Dr. Archibald  RIGHT  Invasive breast carcinoma of no special type (ductal NST / invasive ductal carcinoma)   34 mm  Separate tumor nodule, 11 mm, most compatible with completely replaced lymph node   Grade 2   Invasive carcinoma less than 1 mm from posterior margin   Subdermal invasion by tumor. 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)    Status post bilateral breast reconstruction    Status post bilateral mastectomy    Cancer Staging   Malignant neoplasm of lower-outer quadrant of right breast of female, estrogen receptor positive (HCC)  Staging form: Breast, AJCC  8th Edition  - Pathologic stage from 9/19/2024: Stage IB (pT2, pN2a, cM0, G2, ER+, IL+, HER2-, Oncotype DX score: 20) - Signed by Ros Hyman MD on 9/26/2024  Stage prefix: Initial diagnosis  Multigene prognostic tests performed: Oncotype DX  Recurrence score range: Greater than or equal to 11  Histologic grading system: 3 grade system    Past Medical History:   Diagnosis Date    Arthritis     BRCA1 negative     BRCA2 negative     Breast cancer (HCC)     Breast mass     Cancer (HCC)      Social History     Socioeconomic History    Marital status: /Civil Union     Spouse name: Not on file    Number of children: Not on file    Years of education: Not on file    Highest education level: Not on file   Occupational History    Not on file   Tobacco Use    Smoking status: Never     Passive exposure: Never    Smokeless tobacco: Never   Vaping Use    Vaping status: Never Used   Substance and Sexual Activity    Alcohol use: Not Currently     Comment: Very rarely, 1 or 2 beers if i do.    Drug use: No    Sexual activity: Yes     Partners: Male     Comment: Tubes tied 16 years ago   Other Topics Concern    Not on file   Social History Narrative    Lives with  and kids     Work for [employer]      Social Determinants of Health     Financial Resource Strain: Not on file   Food Insecurity: Not on file   Transportation Needs: Not on file   Physical Activity: Not on file   Stress: Not on file   Social Connections: Not on file   Intimate Partner Violence: Not on file   Housing Stability: Not on file      Family History   Problem Relation Age of Onset    Dementia Mother     Lung cancer Mother     Heart disease Father     Skin cancer Father     Heart attack Father         AT THE AGE OF 60    No Known Problems Son     No Known Problems Daughter     No Known Problems Maternal Grandmother     No Known Problems Maternal Grandfather     No Known Problems Paternal Grandmother     No Known Problems Paternal Grandfather     Breast  cancer Neg Hx      Past Surgical History:   Procedure Laterality Date    BREAST BIOPSY Right 2024     SECTION      x2    FOOT SURGERY      MAMMO NEEDLE LOCALIZATION LEFT (ALL INC) Left 9/3/2024    MRI BREAST BIOPSY LEFT (ALL INCLUSIVE) Left 2024    MRI BREAST BIOPSY RIGHT (ALL INCLUSIVE) Right 2024    OK MAST MODF RAD W/AX LYMPH NOD W/WO PECT/AMALIA MIN Right 2024    Procedure: RIGHT BREAST MODIFIED RADICAL MASTECTOMY LEVEL I AND II LYMPH NODE DISSECTION;  Surgeon: Mert Brink MD;  Location: [facility];  Service: Surgical Oncology    OK MASTECTOMY SIMPLE COMPLETE Left 2024    Procedure: LEFT MASTECTOMY, SAMY CLIPS IN THE RIGHT BREAST/AXILLARY LYMPH NODE AND LEFT BREAST;  Surgeon: Mert Brink MD;  Location: [facility];  Service: Surgical Oncology    OK TISSUE EXPANDER PLACEMENT BREAST RECONSTRUCTION Bilateral 2024    Procedure: FIRST STAGE BILATERAL BREAST RECONSTRUCTION WITH TISSUE EXPANDERS AND ADM;  Surgeon: Jonathan Archibald MD;  Location: [facility];  Service: Plastics    US GUIDED BREAST BIOPSY RIGHT COMPLETE Right 2024    US GUIDED BREAST LYMPH NODE BIOPSY RIGHT Right 2024       Current Outpatient Medications:     cyclobenzaprine (FLEXERIL) 10 mg tablet, Take 1 tablet (10 mg total) by mouth 3 (three) times a day for 7 days, Disp: 21 tablet, Rfl: 0    dexamethasone (DECADRON) 4 mg tablet, Take 2 tablets (8 mg total) by mouth 2 (two) times a day with meals START DAY BEFORE CHEMO DAY OF CHEMO AND DAY AFTER CHEMO EVERY 3 WEEKS FOR FOUR CYCLES (Patient not taking: Reported on 10/23/2024), Disp: 12 tablet, Rfl: 3    enoxaparin (Lovenox) 40 mg/0.4 mL, Inject 0.4 mL (40 mg total) under the skin in the morning for 7 days (Patient not taking: Reported on 10/9/2024), Disp: 2.8 mL, Rfl: 0    gabapentin (Neurontin) 300 mg capsule, Take 1 capsule (300 mg total) by mouth 3 (three) times a day for 7 days, Disp: 21 capsule, Rfl: 0    loratadine  (CLARITIN) 10 mg tablet, Take 10 mg by mouth as needed for allergies (Patient not taking: Reported on 10/14/2024), Disp: , Rfl:     ondansetron (ZOFRAN) 8 mg tablet, Take 1 tablet (8 mg total) by mouth every 8 (eight) hours as needed for nausea or vomiting (Patient not taking: Reported on 10/23/2024), Disp: 20 tablet, Rfl: 3    oxyCODONE (Roxicodone) 5 immediate release tablet, Take 1 tablet (5 mg total) by mouth every 6 (six) hours as needed for severe pain Max Daily Amount: 20 mg (Patient not taking: Reported on 10/14/2024), Disp: 10 tablet, Rfl: 0  No Known Allergies    Review of Systems:  Review of Systems   Constitutional: Negative.    HENT: Negative.     Eyes: Negative.    Respiratory: Negative.     Cardiovascular: Negative.    Gastrointestinal: Negative.    Endocrine: Negative.    Genitourinary: Negative.    Musculoskeletal: Negative.    Skin:         Surgical sites well healing   Allergic/Immunologic: Negative.    Neurological: Negative.    Hematological: Negative.    Psychiatric/Behavioral: Negative.           OB/GYN History:  The patient underwent menarche at 14 years  Menopause Status Post  No LMP recorded.  Menopause at 2  years.  Menopause Reason: n/a  Hormone replacement therapy: no.  Years used 0   3   Para 3   Age at first delivery being 30 years.   Nursing: no.   Birth control pills: yes.  Years used: a few months    Physical Exam:  Physical Exam  Vitals and nursing note reviewed. Exam conducted with a chaperone present.   Constitutional:       General: She is not in acute distress.     Appearance: Normal appearance.   HENT:      Nose: No congestion.   Eyes:      General: No scleral icterus.     Extraocular Movements: Extraocular movements intact.      Pupils: Pupils are equal, round, and reactive to light.   Pulmonary:      Effort: Pulmonary effort is normal. No respiratory distress.   Chest:   Breasts:     Right: Absent. No mass, skin change or tenderness.      Left: Absent.           Comments: Bilateral chest wall with well-healing mastectomy incisions, no visible or palpable masses, no palpable adenopathy  Musculoskeletal:         General: No swelling. Normal range of motion.      Cervical back: Normal range of motion.   Lymphadenopathy:      Cervical: No cervical adenopathy.      Upper Body:      Right upper body: No supraclavicular or axillary adenopathy.      Left upper body: No supraclavicular or axillary adenopathy.   Skin:     General: Skin is warm and dry.   Neurological:      General: No focal deficit present.      Mental Status: She is alert and oriented to person, place, and time.   Psychiatric:         Mood and Affect: Mood normal.         Thought Content: Thought content normal.         Judgment: Judgment normal.       LABS:    CBC  Diff   Lab Results   Component Value Date/Time    WBC 9.45 09/20/2024 04:55 AM    HGB 12.1 09/20/2024 04:55 AM    HCT 36.0 09/20/2024 04:55 AM    RBC 4.03 09/20/2024 04:55 AM    MCV 89 09/20/2024 04:55 AM    MCHC 33.6 09/20/2024 04:55 AM    MCH 30.0 09/20/2024 04:55 AM    RDW 12.5 09/20/2024 04:55 AM    MPV 9.7 09/20/2024 04:55 AM    Lab Results   Component Value Date/Time    LYMPHSABS 1.02 07/18/2024 12:03 PM    EOSABS 0.06 07/18/2024 12:03 PM    MONOSABS 0.46 07/18/2024 12:03 PM    BASOSABS 0.06 07/18/2024 12:03 PM        Basic Metabolic Profile    Lab Results   Component Value Date/Time    SODIUM 136 09/20/2024 04:55 AM    SODIUM 141 01/21/2019 03:04 PM    CO2 24 09/20/2024 04:55 AM    CO2 29 01/21/2019 03:04 PM    Lab Results   Component Value Date/Time    BUN 9 09/20/2024 04:55 AM    BUN 13 01/21/2019 03:04 PM    CREATININE 0.55 (L) 09/20/2024 04:55 AM    CREATININE 0.76 01/21/2019 03:04 PM        I spent 60 minutes reviewing the medical record including multiple imaging studies and the reports, multiple pathology and laboratory reports, provider encounter notes, operative reports, discussing evidence-based treatment recommendations with the patient  and her  and performing physical examination.

## 2024-10-23 NOTE — PROGRESS NOTES
Stacey Villatoro 1975 is a 49 y.o. female referred by Dr. Brink for invasive right breast cancer ER/MS positive, HER2 negative s/p right modified radical mastectomy and left simple mastectomy with immediate reconstruction.     Patient went for her first screening mammogram in June 2024 that revealed  2 adjacent irregularly shaped masses seen in the lower central region of the right breast in the posterior depth. She went for diagnostic imaging and biopsy of right breast revealing invasive breast carcinoma, grade 2, ER 95%, MS 61%, HER 2 negative, with right axillary metastasis. Staging workup revealed no distant disease. She also underwent bilateral breast MRI and MRI guided biopsy both breast right breast additional biopsies are benign. Left breast biopsy is consistent with atypical lobular hyperplasia. She underwent Right modified radical mastectomy left simple mastectomy immediate reconstruction with Dr Brink and Dr Archibald on 9/19/24.  She consulted with Dr. Hyman and she is scheduled to start adjuvant chemotherapy with Taxotere and cyclophosphamide (TC) every 3-weeks for total of 4 cycles, on 10/31/24.        6/18/24 Screening bilateral mammogram  FINDINGS:   RIGHT  1) MASS [A]: There is are 2 adjacent irregularly shaped masses seen in the lower central region of the right breast in the posterior depth.  There is associated architectural distortion.  On the CC tomosynthesis images it appears that there is also associated nipple retraction.    2) LYMPH NODE [B]: 2 prominent right axillary lymph nodes are visualized.  The most inferior of these lymph nodes contain internal calcifications.     Left  There are no suspicious masses, grouped microcalcifications or areas of unexplained architectural distortion. The skin and nipple areolar complex are unremarkable.        IMPRESSION:  Additional imaging required.  A breast health care nurse from our facility will be contacting the patient regarding the need for  additional imaging.      ASSESSMENT/BI-RADS CATEGORY:  Left: 1 - Negative  Right: 0 - Incomplete: Needs Additional Imaging Evaluation  Overall: 0 - Incomplete: Needs Additional Imaging Evaluation     RECOMMENDATION:       - Diagnostic mammogram at the current time for the right breast.       - Ultrasound at the current time for the right breast.       - Routine screening mammogram in 1 year for the left breast.      7/11/24 Diagnostic mammogram & US right breast  FINDINGS:   RIGHT  1) MASS [A]  Mammo diagnostic right w 3d & cad: There is an irregularly shaped mass seen in the lower central region of the right breast in the posterior depth. Associated features include skin retraction, nipple retraction and architectural distortion.   US breast right limited (diagnostic): There is a 35 mm x 16 mm x 36 mm irregularly shaped, hypoechoic mass with spiculated margins seen in the lower central region of the right breast at 7 o'clock, 7 cm from the nipple.   2) LYMPH NODE [C]  Mammo diagnostic right w 3d & cad: There is a lymph node seen in the right axilla with associated calcifications.  US breast right limited (diagnostic): 2 abnormal appearing lymph nodes are noted in the right axilla.     IMPRESSION:  Highly suspicious mass in the right breast and right axillary adenopathy.  Right ultrasound-guided core needle biopsies were subsequently performed and reported separately.      ASSESSMENT/BI-RADS CATEGORY:  Right: 5 - Highly Suggestive of Malignancy  Overall: 5 - Highly Suggestive of Malignancy     RECOMMENDATION:       - Ultrasound-guided breast biopsy for the right breast.      7/11/24 US guided Biopsy, right breast  SPECIMEN   Procedure  Needle biopsy   Specimen Laterality  Right   TUMOR   Tumor Site  Clock position     7 o'clock   Tumor Site  Distance from nipple (Centimeters): 7 cm   Histologic Type  Invasive carcinoma of no special type (ductal)   Histologic Grade ([place] Histologic Score)     Glandular  (Acinar) / Tubular Differentiation  Score 3   Nuclear Pleomorphism  Score 2   Mitotic Rate  Score 1   Overall Grade  Grade 2 (scores of 6 or 7)   Largest Invasive Focus in this Limited Biopsy Sample  14 mm   Ductal Carcinoma In Situ (DCIS)  Present   Architectural Patterns  Solid   Nuclear Grade  Grade II (intermediate)   Necrosis  Not identified   .     Breast Biomarker Reporting Template   Protocol posted: 12/13/2023(Added in Addendum) BREAST BIOMARKER REPORTING TEMPLATE - A  Test(s) Performed     Estrogen Receptor (ER) Status  Positive (greater than 10% of cells demonstrate nuclear positivity)   Percentage of Cells with Nuclear Positivity  >95 %   Average Intensity of Staining  Strong   Test Type  Food and Drug Administration (FDA) cleared (test / vendor): CONFIRM anti-Estrogen Receptor (ER)/[place] [employer]   Primary Antibody  SP1   Test(s) Performed     Progesterone Receptor (PgR) Status  Positive   Percentage of Cells with Nuclear Positivity  61-70%   Average Intensity of Staining  Moderate   Test Type  Food and Drug Administration (FDA) cleared (test / vendor): CONFIRM anti-Progesterone Receptor (IA)/[employer]   Primary Antibody  1E2   Test(s) Performed     HER2 by Immunohistochemistry  Negative (Score 1+)   Test Type  Food and Drug Administration (FDA) cleared (test / vendor): PATHWAY anti-HER-2/divina (4B5)/[employer]   Primary Antibody  4B5   Cold Ischemia and Fixation Times  Meet requirements specified in latest version of the ASCO / CAP Guidelines   Cold Ischemia Time (minutes)  1 min   Fixation Time (hours)  10 hours   Testing Performed on Block Number(s)  A2   METHODS   Fixative  Formalin   Image Analysis  Not performed   .       7/18/24 CT chest abdomen pelvis  No signs of metastatic disease in the chest, abdomen or pelvis.       7/19/24 MRI bilateral breast  Continued surgical and oncologic management is recommended for the biopsy proven malignancy of the right breast corresponding to a 3.4 cm mass  at 7:00 in the right breast and the biopsy proven metastatic right axillary lymph node. There are approximately 4 enlarged right level 1 lymph nodes. No internal mammary adenopathy.      Indeterminate mass at 12:00 and non-mass enhancement at 9:00 in the right breast. If patient is pursuing breast conservation therapy, two site MRI guided biopsy of the right breast is recommended.      Indeterminate non-mass enhancement at 7:00 in the left breast. MRI guided biopsy is recommended.      Of note, bilateral biopsies cannot be preformed on the same day as the left side will need a medial approach.       ASSESSMENT/BI-RADS CATEGORY:  Left: 4 - Suspicious  Right: 4 - Suspicious  Overall: 4 - Suspicious     RECOMMENDATION:       - Surgical consultation for the right breast.       - MRI-guided breast biopsy for both breasts.      7/24/24 Bone scan  No scintigraphic evidence of osseous metastasis.       8/1/24 US thyroid  Diffusely heterogeneous and hyperemic enlarged thyroid gland typically seen in the setting of thyroiditis. No distinct solid nodule.       8/9/24 MRI guided breast biopsy  A. Left breast, 7:00 position (core needle biopsy):  - Lobular neoplasia (ALH & LCIS, 4 foci with 5mm largest focus)    - Lobular neoplasia involves three (3) cores   - No invasive carcinoma identified       8/16/24 MRI guided breast biopsy  A.  Right breast, 12:00, MRI guided biopsy:     - Unremarkable ductules and lobules in a background of dense stromal fibrosis.     - Focal changes suggestive for prior biopsy.     - Negative for atypical proliferative lesions, in situ carcinoma and invasive carcinoma.     B.  Right breast, 10:00, MRI guided biopsy:     - Focus of columnar cell change and columnar cell hyperplasia with calcifications.     - Background of unremarkable ductules and lobules in a background of dense stromal fibrosis.     - Negative for atypical proliferative lesions, in situ carcinoma and invasive carcinoma.      9/19/24  Surgery, Right modified radical mastectomy left simple mastectomy immediate reconstruction with Dr. Archibald   Procedure  Total mastectomy   Specimen Laterality  Right   TUMOR   Tumor Site  Clock position     7 o'clock   Tumor Site  Distance from nipple (Centimeters): 7 cm   Histologic Type  Invasive carcinoma of no special type (ductal)   Histologic Grade (Sentinel Histologic Score)     Glandular (Acinar) / Tubular Differentiation  Score 3   Nuclear Pleomorphism  Score 2   Mitotic Rate  Score 1   Overall Grade  Grade 2 (scores of 6 or 7)   Tumor Size  Greatest dimension of largest invasive focus (Millimeters): 34 mm   Additional Dimension (Millimeters)  20 mm     18 mm   Ductal Carcinoma In Situ (DCIS)  Present     Negative for extensive intraductal component (EIC)   Size (Extent) of DCIS  Cannot be determined: Less than 10% of tumor is DCIS.   Architectural Patterns  Solid   Nuclear Grade  Grade II (intermediate)   Necrosis  Present, focal (small foci or single cell necrosis)   Lobular Carcinoma In Situ (LCIS)  Not identified   Lymphatic and / or Vascular Invasion  Present     Extensive   Dermal Lymphatic and / or Vascular Invasion  Not identified   Microcalcifications  Present in non-neoplastic tissue   Treatment Effect in the Breast  No known presurgical therapy   MARGINS   Margin Status for Invasive Carcinoma  Invasive carcinoma less than 1 mm from posterior margin.     Margin Status for DCIS  All margins negative for DCIS   Distance from DCIS to Closest Margin  Greater than: 2 mm   Closest Margin(s) to DCIS  Posterior   REGIONAL LYMPH NODES   Regional Lymph Node Status  Tumor present in regional lymph node(s)   Number of Lymph Nodes with Macrometastases  5   Number of Lymph Nodes with Micrometastases  0   Number of Lymph Nodes with Isolated Tumor Cells  0   Size of Largest [place] Metastatic Deposit  20 mm   Extranodal Extension  Present, greater than 2 mm   Total Number of Lymph Nodes Examined (sentinel and  non-sentinel)  12   pTNM CLASSIFICATION (AJCC 8th Edition)   Reporting of pT, pN, and (when applicable) pM categories is based on information available to the pathologist at the time the report is issued. As per the AJCC (Chapter 1, 8th Ed.) it is the managing physician’s responsibility to establish the final pathologic stage based upon all pertinent information, including but potentially not limited to this pathology report.   pT Category  pT2   pN Category  pN2a   ADDITIONAL FINDINGS   Additional Findings  Part A:  Subdermal invasion by tumor.  Perineural invasion by tumor.  ADH / FEA and ALH.   SPECIAL STUDIES        Estrogen Receptor (ER) Status  Positive (greater than 10% of cells demonstrate nuclear positivity)   Percentage of Cells with Nuclear Positivity  %        Progesterone Receptor (PgR) Status  Positive   Percentage of Cells with Nuclear Positivity  61-70%        HER2 (by immunohistochemistry)  Negative (Score 1+)   Testing Performed on Case Number  [id number]         9/26/24 Med Onc, Dr. Hyman  stage IIIa and pathological stage Ib (pT2, pN2a, cM0, G2), ER positive, CT positive, HER2/divina 1+ negative, 4 /10 involved with metastatic disease with extranodal extension.  Status post radical bilateral mastectomy on 9/19/2024.  In the left breast mastectomy revealed noninvasive lobular neoplasia.  Reviewed that even though the Oncotype DX recurrence score came back at the low score of 20, she would definitely benefit from adjuvant chemotherapy since she had 4 lymph nodes involved in the right axilla with high risk features including extranodal extension.   Plan for adjuvant chemotherapy with Taxotere and cyclophosphamide (TC) on every 3-week basis for total of 4 cycles.     Discussed the benefit of adjuvant radiation treatment which will be further discussed by the radiation oncology team.  Discussed the need for endocrine therapy with one of the aromatase inhibitors in combination of ovarian suppression  with Lupron or Zoladex since she is perimenopausal.  She also meets the high risk criteria for CDK 4-6 inhibitor for 2 years of abemaciclib adjuvantly.      10/9/24 Dr. Brink   s/p bilateral mastectomy with right axillary sentinel lymph node biopsy with immediate reconstruction she is overall doing well. Pathology was reviewed and discussed in detail. Need for adjuvant chemotherapy and radiation, referred her to medical and radiation oncologist.       10/14/24 Plastic surgery  s/p Right Breast Modified Radical Mastectomy Level I And Ii Lymph Node Dissection - Right, Left Mastectomy, Sheron Clips In The Right Breast/axillary Lymph Node And Left Breast - Left, and First Stage Bilateral Breast Reconstruction With Tissue Expanders And Adm - Bilateral      -Right breast significantly improved   -Hold off on tissue expansions until right breast is completely healed.   -Advised patient to continue course of antibiotics and avoid water to right chest.  -Patient educated on how to apply ointment, Xeroform, and ABD to right breast incision, advised the patient that we will have to keep a very close eye on this area and if it continues to worsen or not progress, we may have to discuss return to the OR for debridement.  anticipate that this area of epidermolysis is superficial and it will heal over the next week or 2. Advised to call with any wound breakdown or drainage from breast.  -Continue wearing surgical compression bra at all times. Patient is to refrain from exercise/repetitive arm movements for 4-6 weeks post-op. No submerging in water.  -The patient is to return in 1 week.  -Abx refilled.       10/21/24 Plastic surgery follow-up  Has been applying antibiotic ointment, Xeroform, and ABDs to right breast daily, states that area of concern looks like it has improved.   Hold off on tissue expansions until right breast is fully healed and all scabbing resolved. Possibly start fills next week?  -Recommend vaseline/aquaphor  to breast incisions daily.   -Continue wearing surgical compression bra at all times. Patient may begin to ease back into regular activity.  -Patient not interested in PT at this time.      Upcoming appts:  10/31/24 Infusion cycle 1  11/21/24 Dr. Hyman and infusion cycle 2  12/12/24 Infusion cycle 3  1/2/25 Infusion cycle 4  1/13/25 Dr. Brink    Oncology History   Malignant neoplasm of lower-outer quadrant of right breast of female, estrogen receptor positive (HCC)   7/11/2024 Initial Diagnosis    Malignant neoplasm of lower-outer quadrant of right breast of female, estrogen receptor positive (HCC)     7/11/2024 Initial Diagnosis    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)     7/11/2024 Biopsy    Right breast ultrasound guided biopsy  A. 7 o'clock 7 cm from nipple  Invasive breast carcinoma of no special type (ductal NST/invasive ductal carcinoma)   Grade 2  ER 95  KY 61  HER2 1+  No lymphovascular invasion    B. Right axillary lymph node  Invasive ductal carcinoma of mammary origin, involving fibroadipose tissue, see comment.   Lymphoid tissue is not identified.    C. Right axillary lymph node  Invasive ductal carcinoma of mammary origin, involving fibroadipose tissue, see comment.   Lymphoid tissue is not identified.    Comment: Parts B and C are submitted as axillary lymph node, however no lymphoid tissue is identified. Carcinoma is present with expression of Jennifer-3 (performed on B/C) supporting the above diagnosis. However, clinical and radiographic correlation is advised as these findings may represent entire andria replacement by metastatic carcinoma or direct extension of the tumor.     Right malignancy appears unifocal. The biopsy-proven carcinoma measured 0.6 cm on ultrasound. There is metastatic disease to a right axillary lymph node.  A second abnormal appearing lymph node is noted in the right axilla (not biopsied). Recent imaging of the contralateral left breast dated 6/18/2024  was reviewed and shows no suspicious findings.      7/18/2024 Genomic Testing    [facility]  A total of 59 genes were evaluated, including: APC, RICKY, BARD 1, BMPR1A, BRCA1, BRCA2, BRIP1, CDH1, CDK4, CDKN1B, CKDN2A, CHEK2, DICER1, FH, FLCN, KIF1B, MAX, MEN1, MET, MLH1, MSH2, MSH6, MUTYH, NBN, NF1, NTHL1, PALB2, PMS2, POT1, PTEN, RAD51C, RAD51D, RB1, RET, SDHA, SDHAF2, SDHB, SDHC, SDHD, SMAD4, SMARCA4, STK11, BCIX297, TP53, TSC1, TSC2, VHL (sequencing and depletion/duplication); AXIN2, CTNNA1, EGLN1, HOXB13, KIT, MITF, MSH3, PDGFRA, POLD1, AND POLE (sequencing only); EPCAM, and GREM1 (depletion/duplication only)  Negative result. No pathogenic sequence variants or deletions/duplications identified     9/19/2024 -  Cancer Staged    Staging form: Breast, AJCC 8th Edition  - Pathologic stage from 9/19/2024: Stage IB (pT2, pN2a, cM0, G2, ER+, IL+, HER2-, Oncotype DX score: 20) - Signed by Ros Hyman MD on 9/26/2024  Stage prefix: Initial diagnosis  Multigene prognostic tests performed: Oncotype DX  Recurrence score range: Greater than or equal to 11  Histologic grading system: 3 grade system       9/19/2024 Surgery    Right modified radical mastectomy left simple mastectomy immediate reconstruction with Dr. Archibald  RIGHT  Invasive breast carcinoma of no special type (ductal NST / invasive ductal carcinoma)   34 mm  Separate tumor nodule, 11 mm, most compatible with completely replaced lymph node   Grade 2   Invasive carcinoma less than 1 mm from posterior margin   Subdermal invasion by tumor. Perineural invasion by tumor  5/12 Lymph nodes; positive for extranodal extension  ER 91  IL 61   HER2 1+  Anatomic stage: at least stage IIIA  Prognostic stage: IB    LEFT  Breast tissue with non-invasive lobular neoplasia (ALH / LCIS) and atypical ductal hyperplasia (ADH)      10/31/2024 -  Chemotherapy    alteplase (CATHFLO), 2 mg, Intracatheter, Every 1 Minute as needed, 0 of 4 cycles  pegfilgrastim (NEULASTA ONPRO), 6 mg,  Subcutaneous, Once, 0 of 4 cycles  cyclophosphamide (CYTOXAN) IVPB, 600 mg/m2 = 1,020 mg, Intravenous, Once, 0 of 4 cycles  DOCEtaxel (TAXOTERE) chemo infusion, 75 mg/m2 = 127.6 mg, Intravenous, Once, 0 of 4 cycles     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)   7/11/2024 Initial Diagnosis    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)     7/18/2024 Genomic Testing    [facility]  A total of 59 genes were evaluated, including: APC, RICKY, BARD 1, BMPR1A, BRCA1, BRCA2, BRIP1, CDH1, CDK4, CDKN1B, CKDN2A, CHEK2, DICER1, FH, FLCN, KIF1B, MAX, MEN1, MET, MLH1, MSH2, MSH6, MUTYH, NBN, NF1, NTHL1, PALB2, PMS2, POT1, PTEN, RAD51C, RAD51D, RB1, RET, SDHA, SDHAF2, SDHB, SDHC, SDHD, SMAD4, SMARCA4, STK11, WHYN062, TP53, TSC1, TSC2, VHL (sequencing and depletion/duplication); AXIN2, CTNNA1, EGLN1, HOXB13, KIT, MITF, MSH3, PDGFRA, POLD1, AND POLE (sequencing only); EPCAM, and GREM1 (depletion/duplication only)  Negative result. No pathogenic sequence variants or deletions/duplications identified     9/19/2024 Surgery    Right modified radical mastectomy left simple mastectomy immediate reconstruction with Dr. Archibald  RIGHT  Invasive breast carcinoma of no special type (ductal NST / invasive ductal carcinoma)   34 mm  Separate tumor nodule, 11 mm, most compatible with completely replaced lymph node   Grade 2   Invasive carcinoma less than 1 mm from posterior margin   Subdermal invasion by tumor. Perineural invasion by tumor  5/12 Lymph nodes; positive for extranodal extension  ER 91  SD 61   HER2 1+  Anatomic stage: at least stage IIIA  Prognostic stage: IB    LEFT  Breast tissue with non-invasive lobular neoplasia (ALH / LCIS) and atypical ductal hyperplasia (ADH)          Review of Systems:  Review of Systems   Constitutional: Negative.    HENT: Negative.     Eyes: Negative.    Respiratory: Negative.     Cardiovascular: Negative.    Gastrointestinal: Negative.    Endocrine: Negative.     Genitourinary: Negative.    Musculoskeletal: Negative.    Skin:         Surgical sites well healing   Allergic/Immunologic: Negative.    Neurological: Negative.    Hematological: Negative.    Psychiatric/Behavioral: Negative.         Clinical Trial: no    OB/GYN History:  The patient underwent menarche at 14 years  Menopause Status Post  No LMP recorded.  Menopause at 2  years.  Menopause Reason: n/a  Hormone replacement therapy: no.  Years used 0   3   Para 3   Age at first delivery being 30 years.   Nursing: no.   Birth control pills: yes.  Years used: a few months    Pregnancy test needed:  no    ONCOTYPE/MAMMOPRINT results: n/a    Prior Radiation: No    Teaching: NCI radiation packet    MST: Completed     Implantable Devices (Port, Pacemaker, pain stimulator): No    Hip Replacement: No        Health Maintenance   Topic Date Due    Pneumococcal Vaccine: Pediatrics (0 to 5 Years) and At-Risk Patients (6 to 64 Years) (1 of 2 - PCV) Never done    BMI: Followup Plan  Never done    Zoster Vaccine (1 of 2) Never done    Influenza Vaccine (1) 2024    Cervical Cancer Screening  10/03/2024    COVID-19 Vaccine ( season) 2024    Annual Physical  2025    BMI: Adult  10/21/2025    Depression Screening  10/23/2025    Colorectal Cancer Screening  2027    DTaP,Tdap,and Td Vaccines (2 - Td or Tdap) 10/03/2029    RSV Vaccine Age 60+ Years (1 - 1-dose 60+ series) 2037    HIV Screening  Completed    Hepatitis C Screening  Completed    RSV Vaccine age 0-20 Months  Aged Out    HIB Vaccine  Aged Out    IPV Vaccine  Aged Out    Hepatitis A Vaccine  Aged Out    Meningococcal ACWY Vaccine  Aged Out    HPV Vaccine  Aged Out     Past Medical History:   Diagnosis Date    Arthritis     BRCA1 negative     BRCA2 negative     Breast cancer (HCC)     Breast mass     Cancer (HCC)      Past Surgical History:   Procedure Laterality Date    BREAST BIOPSY Right 2024     SECTION      x2     FOOT SURGERY      MAMMO NEEDLE LOCALIZATION LEFT (ALL INC) Left 9/3/2024    MRI BREAST BIOPSY LEFT (ALL INCLUSIVE) Left 8/9/2024    MRI BREAST BIOPSY RIGHT (ALL INCLUSIVE) Right 8/16/2024    VT MAST MODF RAD W/AX LYMPH NOD W/WO PECT/AMALIA MIN Right 9/19/2024    Procedure: RIGHT BREAST MODIFIED RADICAL MASTECTOMY LEVEL I AND II LYMPH NODE DISSECTION;  Surgeon: Mert Brink MD;  Location: [facility];  Service: Surgical Oncology    VT MASTECTOMY SIMPLE COMPLETE Left 9/19/2024    Procedure: LEFT MASTECTOMY, SAMY CLIPS IN THE RIGHT BREAST/AXILLARY LYMPH NODE AND LEFT BREAST;  Surgeon: Mert Brink MD;  Location: [facility];  Service: Surgical Oncology    VT TISSUE EXPANDER PLACEMENT BREAST RECONSTRUCTION Bilateral 9/19/2024    Procedure: FIRST STAGE BILATERAL BREAST RECONSTRUCTION WITH TISSUE EXPANDERS AND ADM;  Surgeon: Jonathan Archibald MD;  Location: [facility];  Service: Plastics    US GUIDED BREAST BIOPSY RIGHT COMPLETE Right 7/11/2024    US GUIDED BREAST LYMPH NODE BIOPSY RIGHT Right 7/11/2024     Family History   Problem Relation Age of Onset    Dementia Mother     Lung cancer Mother     Heart disease Father     Skin cancer Father     Heart attack Father         AT THE AGE OF 60    No Known Problems Son     No Known Problems Daughter     No Known Problems Maternal Grandmother     No Known Problems Maternal Grandfather     No Known Problems Paternal Grandmother     No Known Problems Paternal Grandfather     Breast cancer Neg Hx      Social History     Tobacco Use    Smoking status: Never     Passive exposure: Never    Smokeless tobacco: Never   Vaping Use    Vaping status: Never Used   Substance Use Topics    Alcohol use: Not Currently     Comment: Very rarely, 1 or 2 beers if i do.    Drug use: No        Current Outpatient Medications:     cyclobenzaprine (FLEXERIL) 10 mg tablet, Take 1 tablet (10 mg total) by mouth 3 (three) times a day for 7 days, Disp: 21 tablet, Rfl: 0     dexamethasone (DECADRON) 4 mg tablet, Take 2 tablets (8 mg total) by mouth 2 (two) times a day with meals START DAY BEFORE CHEMO DAY OF CHEMO AND DAY AFTER CHEMO EVERY 3 WEEKS FOR FOUR CYCLES (Patient not taking: Reported on 10/23/2024), Disp: 12 tablet, Rfl: 3    enoxaparin (Lovenox) 40 mg/0.4 mL, Inject 0.4 mL (40 mg total) under the skin in the morning for 7 days (Patient not taking: Reported on 10/9/2024), Disp: 2.8 mL, Rfl: 0    gabapentin (Neurontin) 300 mg capsule, Take 1 capsule (300 mg total) by mouth 3 (three) times a day for 7 days, Disp: 21 capsule, Rfl: 0    loratadine (CLARITIN) 10 mg tablet, Take 10 mg by mouth as needed for allergies (Patient not taking: Reported on 10/14/2024), Disp: , Rfl:     ondansetron (ZOFRAN) 8 mg tablet, Take 1 tablet (8 mg total) by mouth every 8 (eight) hours as needed for nausea or vomiting (Patient not taking: Reported on 10/23/2024), Disp: 20 tablet, Rfl: 3    oxyCODONE (Roxicodone) 5 immediate release tablet, Take 1 tablet (5 mg total) by mouth every 6 (six) hours as needed for severe pain Max Daily Amount: 20 mg (Patient not taking: Reported on 10/14/2024), Disp: 10 tablet, Rfl: 0  No Known Allergies   Vitals:    10/23/24 1104   BP: 138/90   BP Location: Left arm   Patient Position: Sitting   Cuff Size: Standard   Pulse: (!) 110   Resp: 18   Temp: 98.7 °F (37.1 °C)   TempSrc: Temporal   SpO2: 99%     Pain Score: 0-No pain

## 2024-10-24 DIAGNOSIS — Z17.0 MALIGNANT NEOPLASM OF LOWER-OUTER QUADRANT OF RIGHT BREAST OF FEMALE, ESTROGEN RECEPTOR POSITIVE (HCC): Primary | ICD-10-CM

## 2024-10-24 DIAGNOSIS — C50.511 MALIGNANT NEOPLASM OF LOWER-OUTER QUADRANT OF RIGHT BREAST OF FEMALE, ESTROGEN RECEPTOR POSITIVE (HCC): Primary | ICD-10-CM

## 2024-10-24 RX ORDER — SODIUM CHLORIDE 9 MG/ML
20 INJECTION, SOLUTION INTRAVENOUS ONCE
OUTPATIENT
Start: 2024-10-31

## 2024-10-29 ENCOUNTER — TELEPHONE (OUTPATIENT)
Dept: INFUSION CENTER | Facility: CLINIC | Age: 49
End: 2024-10-29

## 2024-10-29 ENCOUNTER — APPOINTMENT (OUTPATIENT)
Dept: LAB | Facility: HOSPITAL | Age: 49
End: 2024-10-29
Attending: INTERNAL MEDICINE
Payer: COMMERCIAL

## 2024-10-29 DIAGNOSIS — C50.511 MALIGNANT NEOPLASM OF LOWER-OUTER QUADRANT OF RIGHT BREAST OF FEMALE, ESTROGEN RECEPTOR POSITIVE (HCC): ICD-10-CM

## 2024-10-29 DIAGNOSIS — Z17.0 MALIGNANT NEOPLASM OF LOWER-OUTER QUADRANT OF RIGHT BREAST OF FEMALE, ESTROGEN RECEPTOR POSITIVE (HCC): ICD-10-CM

## 2024-10-29 LAB
ALBUMIN SERPL BCG-MCNC: 4.3 G/DL (ref 3.5–5)
ALP SERPL-CCNC: 63 U/L (ref 34–104)
ALT SERPL W P-5'-P-CCNC: 9 U/L (ref 7–52)
ANION GAP SERPL CALCULATED.3IONS-SCNC: 6 MMOL/L (ref 4–13)
AST SERPL W P-5'-P-CCNC: 14 U/L (ref 13–39)
BASOPHILS # BLD AUTO: 0.04 THOUSANDS/ΜL (ref 0–0.1)
BASOPHILS NFR BLD AUTO: 1 % (ref 0–1)
BILIRUB SERPL-MCNC: 0.73 MG/DL (ref 0.2–1)
BUN SERPL-MCNC: 9 MG/DL (ref 5–25)
CALCIUM SERPL-MCNC: 8.8 MG/DL (ref 8.4–10.2)
CHLORIDE SERPL-SCNC: 104 MMOL/L (ref 96–108)
CO2 SERPL-SCNC: 25 MMOL/L (ref 21–32)
CREAT SERPL-MCNC: 0.74 MG/DL (ref 0.6–1.3)
EOSINOPHIL # BLD AUTO: 0.37 THOUSAND/ΜL (ref 0–0.61)
EOSINOPHIL NFR BLD AUTO: 8 % (ref 0–6)
ERYTHROCYTE [DISTWIDTH] IN BLOOD BY AUTOMATED COUNT: 12.4 % (ref 11.6–15.1)
GFR SERPL CREATININE-BSD FRML MDRD: 96 ML/MIN/1.73SQ M
GLUCOSE P FAST SERPL-MCNC: 106 MG/DL (ref 65–99)
HCT VFR BLD AUTO: 40.7 % (ref 34.8–46.1)
HGB BLD-MCNC: 13.9 G/DL (ref 11.5–15.4)
IMM GRANULOCYTES # BLD AUTO: 0.01 THOUSAND/UL (ref 0–0.2)
IMM GRANULOCYTES NFR BLD AUTO: 0 % (ref 0–2)
LYMPHOCYTES # BLD AUTO: 1.05 THOUSANDS/ΜL (ref 0.6–4.47)
LYMPHOCYTES NFR BLD AUTO: 22 % (ref 14–44)
MCH RBC QN AUTO: 30.2 PG (ref 26.8–34.3)
MCHC RBC AUTO-ENTMCNC: 34.2 G/DL (ref 31.4–37.4)
MCV RBC AUTO: 88 FL (ref 82–98)
MONOCYTES # BLD AUTO: 0.47 THOUSAND/ΜL (ref 0.17–1.22)
MONOCYTES NFR BLD AUTO: 10 % (ref 4–12)
NEUTROPHILS # BLD AUTO: 2.87 THOUSANDS/ΜL (ref 1.85–7.62)
NEUTS SEG NFR BLD AUTO: 59 % (ref 43–75)
NRBC BLD AUTO-RTO: 0 /100 WBCS
PLATELET # BLD AUTO: 200 THOUSANDS/UL (ref 149–390)
PMV BLD AUTO: 9.4 FL (ref 8.9–12.7)
POTASSIUM SERPL-SCNC: 4.4 MMOL/L (ref 3.5–5.3)
PROT SERPL-MCNC: 7.3 G/DL (ref 6.4–8.4)
RBC # BLD AUTO: 4.61 MILLION/UL (ref 3.81–5.12)
SODIUM SERPL-SCNC: 135 MMOL/L (ref 135–147)
WBC # BLD AUTO: 4.81 THOUSAND/UL (ref 4.31–10.16)

## 2024-10-29 PROCEDURE — 85025 COMPLETE CBC W/AUTO DIFF WBC: CPT

## 2024-10-29 PROCEDURE — 80053 COMPREHEN METABOLIC PANEL: CPT

## 2024-10-29 PROCEDURE — 36415 COLL VENOUS BLD VENIPUNCTURE: CPT

## 2024-10-29 NOTE — TELEPHONE ENCOUNTER
New patient phone call made. Reviewed appointment date and time. Reviewed policies and procedures of infusion center. All questions answered at this time.

## 2024-10-31 ENCOUNTER — HOSPITAL ENCOUNTER (OUTPATIENT)
Dept: INFUSION CENTER | Facility: CLINIC | Age: 49
Discharge: HOME | End: 2024-10-31
Payer: COMMERCIAL

## 2024-10-31 VITALS
RESPIRATION RATE: 18 BRPM | HEART RATE: 109 BPM | BODY MASS INDEX: 24.93 KG/M2 | SYSTOLIC BLOOD PRESSURE: 158 MMHG | WEIGHT: 149.6 LBS | OXYGEN SATURATION: 100 % | DIASTOLIC BLOOD PRESSURE: 83 MMHG | HEIGHT: 65 IN | TEMPERATURE: 97.2 F

## 2024-10-31 DIAGNOSIS — Z17.0 MALIGNANT NEOPLASM OF LOWER-OUTER QUADRANT OF RIGHT BREAST OF FEMALE, ESTROGEN RECEPTOR POSITIVE (HCC): Primary | ICD-10-CM

## 2024-10-31 DIAGNOSIS — C50.511 MALIGNANT NEOPLASM OF LOWER-OUTER QUADRANT OF RIGHT BREAST OF FEMALE, ESTROGEN RECEPTOR POSITIVE (HCC): Primary | ICD-10-CM

## 2024-10-31 RX ORDER — SODIUM CHLORIDE 9 MG/ML
20 INJECTION, SOLUTION INTRAVENOUS ONCE
Status: COMPLETED | OUTPATIENT
Start: 2024-10-31 | End: 2024-10-31

## 2024-10-31 RX ORDER — FAMOTIDINE 10 MG/ML
20 INJECTION, SOLUTION INTRAVENOUS ONCE
Status: COMPLETED | OUTPATIENT
Start: 2024-10-31 | End: 2024-10-31

## 2024-10-31 RX ORDER — DIPHENHYDRAMINE HYDROCHLORIDE 50 MG/ML
50 INJECTION INTRAMUSCULAR; INTRAVENOUS ONCE
Status: COMPLETED | OUTPATIENT
Start: 2024-10-31 | End: 2024-10-31

## 2024-10-31 RX ORDER — ALBUTEROL SULFATE 0.83 MG/ML
2.5 SOLUTION RESPIRATORY (INHALATION) ONCE
Status: COMPLETED | OUTPATIENT
Start: 2024-10-31 | End: 2024-10-31

## 2024-10-31 RX ADMIN — FAMOTIDINE 20 MG: 10 INJECTION, SOLUTION INTRAVENOUS at 14:19

## 2024-10-31 RX ADMIN — DOCETAXEL 127.6 MG: 20 INJECTION, SOLUTION, CONCENTRATE INTRAVENOUS at 14:07

## 2024-10-31 RX ADMIN — DIPHENHYDRAMINE HYDROCHLORIDE 50 MG: 50 INJECTION, SOLUTION INTRAMUSCULAR; INTRAVENOUS at 14:15

## 2024-10-31 RX ADMIN — CYCLOPHOSPHAMIDE 1000 MG: 1 INJECTION, POWDER, FOR SOLUTION INTRAVENOUS; ORAL at 15:39

## 2024-10-31 RX ADMIN — ALBUTEROL SULFATE 2.5 MG: 2.5 SOLUTION RESPIRATORY (INHALATION) at 14:20

## 2024-10-31 RX ADMIN — HYDROCORTISONE SODIUM SUCCINATE 100 MG: 100 INJECTION, POWDER, FOR SOLUTION INTRAMUSCULAR; INTRAVENOUS at 14:17

## 2024-10-31 RX ADMIN — PEGFILGRASTIM 6 MG: KIT SUBCUTANEOUS at 16:54

## 2024-10-31 RX ADMIN — SODIUM CHLORIDE 20 ML/HR: 0.9 INJECTION, SOLUTION INTRAVENOUS at 13:08

## 2024-10-31 RX ADMIN — DEXAMETHASONE SODIUM PHOSPHATE: 10 INJECTION, SOLUTION INTRAMUSCULAR; INTRAVENOUS at 13:17

## 2024-10-31 NOTE — PROGRESS NOTES
Pt here for Taxotere and cytoxan infusion, offering no complaints. Peripheral IV placed with positive blood return noted. 5 minutes into taxotere infusion, pt rang call bell complaining of flushing, SOB, and chest tightness. Taxotere infusion stopped. Hypersensitivity protocol initiated. Pt returned back to baseline, vitals stable. Reached out to Maggie Novak RN, per Dr. Hyman ok to restart Taxotere at the same rate and continue infusion as ordered. Ok to restart order placed by Dr. Hyman. Pt tolerated remainder of infusion without incident. Reached out to Maggie Novak RN to add benadryl to future doses. IV benadryl added to future doses. Peripheral IV removed. Neulasta onpro applied to right arm. AVS declined. Walked out in stable condition. Next appointment on 11/21/24 at 11am.

## 2024-11-01 ENCOUNTER — OFFICE VISIT (OUTPATIENT)
Age: 49
End: 2024-11-01

## 2024-11-01 VITALS
HEIGHT: 64 IN | HEART RATE: 82 BPM | SYSTOLIC BLOOD PRESSURE: 148 MMHG | DIASTOLIC BLOOD PRESSURE: 93 MMHG | TEMPERATURE: 98.6 F | OXYGEN SATURATION: 98 % | WEIGHT: 149 LBS | BODY MASS INDEX: 25.44 KG/M2

## 2024-11-01 DIAGNOSIS — Z98.890 STATUS POST BILATERAL BREAST RECONSTRUCTION: ICD-10-CM

## 2024-11-01 DIAGNOSIS — Z48.89 ENCOUNTER FOR FOLLOW-UP CARE INVOLVING PLASTIC SURGERY: Primary | ICD-10-CM

## 2024-11-01 PROCEDURE — 99024 POSTOP FOLLOW-UP VISIT: CPT | Performed by: PHYSICIAN ASSISTANT

## 2024-11-01 RX ORDER — CYCLOBENZAPRINE HCL 10 MG
10 TABLET ORAL 3 TIMES DAILY PRN
Qty: 30 TABLET | Refills: 0 | Status: SHIPPED | OUTPATIENT
Start: 2024-11-01

## 2024-11-01 NOTE — PROGRESS NOTES
Patient Identification: Stacey Villatoro is a 49 y.o. female     History of Present Illness: The patient is a 49 y.o.  year-old female  who presents to the office for post-op visit. Patient is 43 days s/p Bilateral first stage breast reconstruction and placement of submuscular Baggs textured 750 mL Yoel tissue expanders, initial fill 100 mL, reinforcement of inferior pole with Flex HD acellular dermal matrix, bilateral pectoralis block with Exparel, right sided axillary advancement flap, 15 cm x 10 cm, placement of bilateral lori none DME negative pressure wound therapy dressings, less than 50 cm² each, total less than 50 cm² on 9/19/2024.    Pt is doing well today, denies wounds, fevers, chills, drainage, sign of infection or significant pain. She has been applying ointment to breasts daily. Wearing bra.  She had first chemo treatment yesterday, reports reaction but otherwise she offers no complaints today. She will need radiation as well.     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)    Status post bilateral breast reconstruction    Status post bilateral mastectomy       Current Outpatient Medications:     dexamethasone (DECADRON) 4 mg tablet, Take 2 tablets (8 mg total) by mouth 2 (two) times a day with meals START DAY BEFORE CHEMO DAY OF CHEMO AND DAY AFTER CHEMO EVERY 3 WEEKS FOR FOUR CYCLES, Disp: 12 tablet, Rfl: 3    cyclobenzaprine (FLEXERIL) 10 mg tablet, Take 1 tablet (10 mg total) by mouth 3 (three) times a day for 7 days, Disp: 21 tablet, Rfl: 0    enoxaparin (Lovenox) 40 mg/0.4 mL, Inject 0.4 mL (40 mg total) under  the skin in the morning for 7 days (Patient not taking: Reported on 10/9/2024), Disp: 2.8 mL, Rfl: 0    gabapentin (Neurontin) 300 mg capsule, Take 1 capsule (300 mg total) by mouth 3 (three) times a day for 7 days, Disp: 21 capsule, Rfl: 0    loratadine (CLARITIN) 10 mg tablet, Take 10 mg by mouth as needed for allergies (Patient not taking: Reported on 10/14/2024), Disp: , Rfl:     ondansetron (ZOFRAN) 8 mg tablet, Take 1 tablet (8 mg total) by mouth every 8 (eight) hours as needed for nausea or vomiting (Patient not taking: Reported on 10/23/2024), Disp: 20 tablet, Rfl: 3    oxyCODONE (Roxicodone) 5 immediate release tablet, Take 1 tablet (5 mg total) by mouth every 6 (six) hours as needed for severe pain Max Daily Amount: 20 mg (Patient not taking: Reported on 10/14/2024), Disp: 10 tablet, Rfl: 0  No current facility-administered medications for this visit.    Facility-Administered Medications Ordered in Other Visits:     alteplase (CATHFLO) injection 2 mg, 2 mg, Intracatheter, Q1MIN PRN, Ros Hyman MD    Past Surgical History:   Procedure Laterality Date    BREAST BIOPSY Right 2024     SECTION      x2    FOOT SURGERY      MAMMO NEEDLE LOCALIZATION LEFT (ALL INC) Left 9/3/2024    MRI BREAST BIOPSY LEFT (ALL INCLUSIVE) Left 2024    MRI BREAST BIOPSY RIGHT (ALL INCLUSIVE) Right 2024    MD MAST MODF RAD W/AX LYMPH NOD W/WO PECT/AMALIA MIN Right 2024    Procedure: RIGHT BREAST MODIFIED RADICAL MASTECTOMY LEVEL I AND II LYMPH NODE DISSECTION;  Surgeon: Mert Brink MD;  Location: [facility];  Service: Surgical Oncology    MD MASTECTOMY SIMPLE COMPLETE Left 2024    Procedure: LEFT MASTECTOMY, SAMY CLIPS IN THE RIGHT BREAST/AXILLARY LYMPH NODE AND LEFT BREAST;  Surgeon: Mert Brink MD;  Location: [facility];  Service: Surgical Oncology    MD TISSUE EXPANDER PLACEMENT BREAST RECONSTRUCTION Bilateral 2024    Procedure: FIRST STAGE BILATERAL BREAST  RECONSTRUCTION WITH TISSUE EXPANDERS AND ADM;  Surgeon: Jonathan Archibald MD;  Location: [facility];  Service: Plastics    US GUIDED BREAST BIOPSY RIGHT COMPLETE Right 7/11/2024    US GUIDED BREAST LYMPH NODE BIOPSY RIGHT Right 7/11/2024     Social History     Tobacco Use    Smoking status: Never     Passive exposure: Never    Smokeless tobacco: Never   Substance Use Topics    Alcohol use: Not Currently     Comment: Very rarely, 1 or 2 beers if i do.     Vitals:    11/01/24 1058   BP: 148/93   Pulse: 82   Temp: 98.6 °F (37 °C)   SpO2: 98%         Physical Exam  General: NAD, alert  Breasts: Bilateral incisions are clean, dry, and intact. There is no evidence of hematoma or seroma formation.  There is no surrounding erythema, wound breakdown, drainage, or signs of infection.     TE Fill  Procedures:   Under sterile conditions the bilateral tissue expanders were percutaneously accessed without difficulty and 50 ml of NS was injected into the right and 50 ml of NS into the left.  Pt tolerated procedure well.     A ''time out'' was initiated prior to procedure. Non-OR time Out:  Time out was initiated under direction and supervision of provider Casey Booker PA-C  Correct patient identity - Yes  Correct side and site - Yes  Procedure matches verbalized consent -Yes  Correct patient position - Yes  Availability of correct implants and any special equipment or special requirements - Yes  Time out occurred prior to procedure start - Yes     Total TE volume:   Right: 150/750 ml   Left: 150/750 ml      Assessment and Plan: 49 y.o.  year-old female s/p Right Breast Modified Radical Mastectomy Level I And Ii Lymph Node Dissection - Right, Left Mastectomy, Sheron Clips In The Right Breast/axillary Lymph Node And Left Breast - Left, and First Stage Bilateral Breast Reconstruction With Tissue Expanders And Adm - Bilateral       -Recommend vaseline/aquaphor to breast incisions daily.   -Continue wearing surgical compression  bra at all times. Patient may begin to ease back into regular activity.  -Patient not interested in PT at this time.  -The patient is to return in 1 week to additional fills.  -The patient is to call the office with any questions or concerns. All of the patient's questions were answered at this time and they agree with the plan of care.      Casey Booker PA-C  Plastic & Reconstructive Surgery

## 2024-11-04 ENCOUNTER — NURSE TRIAGE (OUTPATIENT)
Age: 49
End: 2024-11-04

## 2024-11-04 LAB — SCAN RESULT: NORMAL

## 2024-11-04 NOTE — TELEPHONE ENCOUNTER
DESCRIPTION (describe complaint in short phrase) Patient calling in reporting she has had constipation for 4-5 days without relief from trying natural remedies; coffee, juice, increased water intake and is hardly passing any gas. Patient denies using any colace or suppositories. I instructed patient to go to ER for evaluation, given how many days it has been, hardly passing gas and lower back pain. Patient reports she wants to try a suppository first, to see if that will help and if not, she will head to the ER this evening. I instructed her to return our phone call if she has any additional questions or concerns.    SEVERITY (mild, moderate, severe) Mild, she feels extremely uncomfortable.     ONSET (of THIS SPECIFIC complaint) About 5 days ago.     CAUSE (what does the patient think is causing this) Unsure, treatment?    MEDICATIONS (any changes in meds or doses?, Take any meds for relief?) Denies    OTHER SYMPTOMS (yes or no- if no, just write that. If yes, write the symptoms c/o) Lower back pain.       Reason for Disposition   Inability to pass gas and or a hard abdomen    Answer Assessment - Initial Assessment Questions  1.What is the cancer diagnosis, and what treatment is the patient receiving? Review past medical history (e.g., diagnoses, surgeries, treatments).  Breast Cx  2.What medications is the patient taking? Obtain medication history, including over-the-counter medications and complementary therapies.  Zofran  3.Ask the patient to describe a typical bowel movement, including frequency, amount and characteristics of their stool, and timing of bowel movements?  Normal/formed.   4. Ask the patient to describe symptoms in detail, including date of last bowel movement. How does stool differ from normal in size, color, and consistency?   Last bowel movement was prior to last chemo, aobut 5 days ago.   5. Was there a distinct odor change or any blood present in the stool?   Denies  6. Have you had diarrhea?    Denies  7. Was the stool difficult to pass?   Unable to go  8. Were there any associated symptoms (e.g., abdominal, or rectal fullness, bloating, nausea, vomiting, excessive gas, pain, cramping)? Has there been any perineal or rectal discomfort?   Denies  9. Obtain history of the problem, including precipitating factors, onset and duration, and any relieving factors (What have you tried, and what have been the results? What have you done in the past if you experienced constipation, including use of stool softeners, fiber agents, laxatives, enemas, or suppositories? What was the effect?)   Suppositories    11. Assess changes in activities of daily living and function, including dietary history, decrease in food or fluid consumption, or decrease in dietary fiber.   Mehrdadies    Protocols used: ONC- Constipation- ADULT-OH

## 2024-11-05 ENCOUNTER — HOSPITAL ENCOUNTER (EMERGENCY)
Facility: HOSPITAL | Age: 49
Discharge: HOME | End: 2024-11-05
Attending: STUDENT IN AN ORGANIZED HEALTH CARE EDUCATION/TRAINING PROGRAM
Payer: COMMERCIAL

## 2024-11-05 ENCOUNTER — APPOINTMENT (EMERGENCY)
Dept: CT IMAGING | Facility: HOSPITAL | Age: 49
End: 2024-11-05
Payer: COMMERCIAL

## 2024-11-05 VITALS
SYSTOLIC BLOOD PRESSURE: 134 MMHG | BODY MASS INDEX: 25.44 KG/M2 | RESPIRATION RATE: 17 BRPM | OXYGEN SATURATION: 99 % | WEIGHT: 149.03 LBS | TEMPERATURE: 97.4 F | HEART RATE: 104 BPM | DIASTOLIC BLOOD PRESSURE: 83 MMHG | HEIGHT: 64 IN

## 2024-11-05 DIAGNOSIS — R10.9 ABDOMINAL PAIN: Primary | ICD-10-CM

## 2024-11-05 LAB
ALBUMIN SERPL BCG-MCNC: 4.2 G/DL (ref 3.5–5)
ALP SERPL-CCNC: 71 U/L (ref 34–104)
ALT SERPL W P-5'-P-CCNC: 20 U/L (ref 7–52)
ANION GAP SERPL CALCULATED.3IONS-SCNC: 8 MMOL/L (ref 4–13)
AST SERPL W P-5'-P-CCNC: 15 U/L (ref 13–39)
BASOPHILS # BLD MANUAL: 0 THOUSAND/UL (ref 0–0.1)
BASOPHILS NFR MAR MANUAL: 0 % (ref 0–1)
BILIRUB SERPL-MCNC: 1.09 MG/DL (ref 0.2–1)
BUN SERPL-MCNC: 14 MG/DL (ref 5–25)
CALCIUM SERPL-MCNC: 8.9 MG/DL (ref 8.4–10.2)
CHLORIDE SERPL-SCNC: 100 MMOL/L (ref 96–108)
CO2 SERPL-SCNC: 29 MMOL/L (ref 21–32)
CREAT SERPL-MCNC: 0.65 MG/DL (ref 0.6–1.3)
EOSINOPHIL # BLD MANUAL: 0.21 THOUSAND/UL (ref 0–0.4)
EOSINOPHIL NFR BLD MANUAL: 11 % (ref 0–6)
ERYTHROCYTE [DISTWIDTH] IN BLOOD BY AUTOMATED COUNT: 11.9 % (ref 11.6–15.1)
EXT PREGNANCY TEST URINE: NEGATIVE
EXT. CONTROL: NORMAL
GFR SERPL CREATININE-BSD FRML MDRD: 106 ML/MIN/1.73SQ M
GLUCOSE SERPL-MCNC: 102 MG/DL (ref 65–140)
HCT VFR BLD AUTO: 40.3 % (ref 34.8–46.1)
HGB BLD-MCNC: 13.5 G/DL (ref 11.5–15.4)
LIPASE SERPL-CCNC: <6 U/L (ref 11–82)
LYMPHOCYTES # BLD AUTO: 0.47 THOUSAND/UL (ref 0.6–4.47)
LYMPHOCYTES # BLD AUTO: 24 % (ref 14–44)
MCH RBC QN AUTO: 29.7 PG (ref 26.8–34.3)
MCHC RBC AUTO-ENTMCNC: 33.5 G/DL (ref 31.4–37.4)
MCV RBC AUTO: 89 FL (ref 82–98)
MONOCYTES # BLD AUTO: 0.12 THOUSAND/UL (ref 0–1.22)
MONOCYTES NFR BLD: 6 % (ref 4–12)
NEUTROPHILS # BLD MANUAL: 1.15 THOUSAND/UL (ref 1.85–7.62)
NEUTS SEG NFR BLD AUTO: 59 % (ref 43–75)
OVALOCYTES BLD QL SMEAR: PRESENT
PLATELET # BLD AUTO: 138 THOUSANDS/UL (ref 149–390)
PLATELET BLD QL SMEAR: ABNORMAL
PMV BLD AUTO: 10.6 FL (ref 8.9–12.7)
POTASSIUM SERPL-SCNC: 4.1 MMOL/L (ref 3.5–5.3)
PROT SERPL-MCNC: 7.1 G/DL (ref 6.4–8.4)
RBC # BLD AUTO: 4.55 MILLION/UL (ref 3.81–5.12)
RBC MORPH BLD: PRESENT
SODIUM SERPL-SCNC: 137 MMOL/L (ref 135–147)
WBC # BLD AUTO: 1.95 THOUSAND/UL (ref 4.31–10.16)

## 2024-11-05 PROCEDURE — 85027 COMPLETE CBC AUTOMATED: CPT | Performed by: STUDENT IN AN ORGANIZED HEALTH CARE EDUCATION/TRAINING PROGRAM

## 2024-11-05 PROCEDURE — 80053 COMPREHEN METABOLIC PANEL: CPT | Performed by: STUDENT IN AN ORGANIZED HEALTH CARE EDUCATION/TRAINING PROGRAM

## 2024-11-05 PROCEDURE — 96365 THER/PROPH/DIAG IV INF INIT: CPT

## 2024-11-05 PROCEDURE — 74177 CT ABD & PELVIS W/CONTRAST: CPT

## 2024-11-05 PROCEDURE — 99284 EMERGENCY DEPT VISIT MOD MDM: CPT

## 2024-11-05 PROCEDURE — 99285 EMERGENCY DEPT VISIT HI MDM: CPT | Performed by: STUDENT IN AN ORGANIZED HEALTH CARE EDUCATION/TRAINING PROGRAM

## 2024-11-05 PROCEDURE — 81025 URINE PREGNANCY TEST: CPT | Performed by: STUDENT IN AN ORGANIZED HEALTH CARE EDUCATION/TRAINING PROGRAM

## 2024-11-05 PROCEDURE — 96361 HYDRATE IV INFUSION ADD-ON: CPT

## 2024-11-05 PROCEDURE — 85007 BL SMEAR W/DIFF WBC COUNT: CPT | Performed by: STUDENT IN AN ORGANIZED HEALTH CARE EDUCATION/TRAINING PROGRAM

## 2024-11-05 PROCEDURE — 36415 COLL VENOUS BLD VENIPUNCTURE: CPT | Performed by: STUDENT IN AN ORGANIZED HEALTH CARE EDUCATION/TRAINING PROGRAM

## 2024-11-05 PROCEDURE — 83690 ASSAY OF LIPASE: CPT | Performed by: STUDENT IN AN ORGANIZED HEALTH CARE EDUCATION/TRAINING PROGRAM

## 2024-11-05 RX ORDER — ACETAMINOPHEN 10 MG/ML
1000 INJECTION, SOLUTION INTRAVENOUS ONCE
Status: COMPLETED | OUTPATIENT
Start: 2024-11-05 | End: 2024-11-05

## 2024-11-05 RX ADMIN — SODIUM CHLORIDE 1000 ML: 0.9 INJECTION, SOLUTION INTRAVENOUS at 10:23

## 2024-11-05 RX ADMIN — IOHEXOL 100 ML: 350 INJECTION, SOLUTION INTRAVENOUS at 11:31

## 2024-11-05 RX ADMIN — ACETAMINOPHEN 1000 MG: 10 INJECTION INTRAVENOUS at 10:23

## 2024-11-05 NOTE — DISCHARGE INSTRUCTIONS
Continues over-the-counter medication as needed for discomfort.  You can alternate between Tylenol and Motrin.  You can initiate a bowel regimen to help with normalization of movements.    Follow-up with your primary.    Return for any new or worsening symptoms.

## 2024-11-05 NOTE — ED PROVIDER NOTES
ED Disposition       None          Assessment & Plan   {Hyperlinks  Risk Stratification - NIHSS - HEART SCORE - Fill out sepsis note and make sure you call [phone] if severe or septic shock:[id number]}    Medical Decision Making  Amount and/or Complexity of Data Reviewed  Labs: ordered.  Radiology: ordered.    Risk  Prescription drug management.             Medications   acetaminophen (Ofirmev) injection 1,000 mg (has no administration in time range)   sodium chloride 0.9 % bolus 1,000 mL (has no administration in time range)       ED Risk Strat Scores                           SBIRT 22yo+      Flowsheet Row Most Recent Value   Initial Alcohol Screen: US AUDIT-C     1. How often do you have a drink containing alcohol? 0 Filed at: 2024   2. How many drinks containing alcohol do you have on a typical day you are drinking?  0 Filed at: 2024   3a. Male UNDER 65: How often do you have five or more drinks on one occasion? 0 Filed at: 2024   3b. FEMALE Any Age, or MALE 65+: How often do you have 4 or more drinks on one occassion? 0 Filed at: 2024   Audit-C Score 0 Filed at: 2024   JUDI: How many times in the past year have you...    Used an illegal drug or used a prescription medication for non-medical reasons? Never Filed at: 2024                            History of Present Illness   {Hyperlinks  History (Med, Surg, Fam, Social) - Current Medications - Allergies  :[id number]}    Chief Complaint   Patient presents with    Constipation     1st chemo tx on the , since then has been constipated. Took suppository yesterday and had a small BM but still having abdominal pain. Denies N/V        Past Medical History:   Diagnosis Date    Arthritis     BRCA1 negative     BRCA2 negative     Breast cancer (HCC)     Breast mass     Cancer (HCC)       Past Surgical History:   Procedure Laterality Date    BREAST BIOPSY Right 2024     SECTION      x2     FOOT SURGERY      MAMMO NEEDLE LOCALIZATION LEFT (ALL INC) Left 9/3/2024    MRI BREAST BIOPSY LEFT (ALL INCLUSIVE) Left 8/9/2024    MRI BREAST BIOPSY RIGHT (ALL INCLUSIVE) Right 8/16/2024    HI MAST MODF RAD W/AX LYMPH NOD W/WO PECT/AMALIA MIN Right 9/19/2024    Procedure: RIGHT BREAST MODIFIED RADICAL MASTECTOMY LEVEL I AND II LYMPH NODE DISSECTION;  Surgeon: Mert Brink MD;  Location: [facility];  Service: Surgical Oncology    HI MASTECTOMY SIMPLE COMPLETE Left 9/19/2024    Procedure: LEFT MASTECTOMY, SAMY CLIPS IN THE RIGHT BREAST/AXILLARY LYMPH NODE AND LEFT BREAST;  Surgeon: Mert Brink MD;  Location: [facility];  Service: Surgical Oncology    HI TISSUE EXPANDER PLACEMENT BREAST RECONSTRUCTION Bilateral 9/19/2024    Procedure: FIRST STAGE BILATERAL BREAST RECONSTRUCTION WITH TISSUE EXPANDERS AND ADM;  Surgeon: Jonathan Archibald MD;  Location: [facility];  Service: Plastics    US GUIDED BREAST BIOPSY RIGHT COMPLETE Right 7/11/2024    US GUIDED BREAST LYMPH NODE BIOPSY RIGHT Right 7/11/2024      Family History   Problem Relation Age of Onset    Dementia Mother     Lung cancer Mother     Heart disease Father     Skin cancer Father     Heart attack Father         AT THE AGE OF 60    No Known Problems Son     No Known Problems Daughter     No Known Problems Maternal Grandmother     No Known Problems Maternal Grandfather     No Known Problems Paternal Grandmother     No Known Problems Paternal Grandfather     Breast cancer Neg Hx       Social History     Tobacco Use    Smoking status: Never     Passive exposure: Never    Smokeless tobacco: Never   Vaping Use    Vaping status: Never Used   Substance Use Topics    Alcohol use: Not Currently     Comment: Very rarely, 1 or 2 beers if i do.    Drug use: No      E-Cigarette/Vaping    E-Cigarette Use Never User       E-Cigarette/Vaping Substances    Nicotine No     THC No     CBD No     Flavoring No     Other No     Unknown No       I have  reviewed and agree with the history as documented.     HPI    Patient is a 49-year-old female present emerged part with generalized abdominal discomfort.  Patient had first round of chemo on 10/31.  Treatment for breast cancer without metastasis.  Patient has been able to keep fluids down.  No nausea or vomiting noted.  Has tried Dulcolax without relief of constipation symptoms.  No burning when she pees or noted fevers and chills.  No URI symptoms.  Prior abdominal surgeries of .    Review of Systems   Constitutional:  Negative for chills and fever.   HENT:  Negative for ear pain and sore throat.    Eyes:  Negative for pain and visual disturbance.   Respiratory:  Negative for cough and shortness of breath.    Cardiovascular:  Negative for chest pain and palpitations.   Gastrointestinal:  Positive for abdominal pain and constipation. Negative for vomiting.   Genitourinary:  Negative for dysuria and hematuria.   Musculoskeletal:  Negative for arthralgias and back pain.   Skin:  Negative for color change and rash.   Neurological:  Negative for seizures and syncope.   All other systems reviewed and are negative.          Objective   {Hyperlinks  Historical Vitals - Historical Labs - Chart Review/Microbiology - Last Echo - Code Status  :[id number]}    ED Triage Vitals   Temperature Pulse Blood Pressure Respirations SpO2 Patient Position - Orthostatic VS   24 0935 2435 2435 24 0935 24 0935 24   (!) 97.4 °F (36.3 °C) (!) 130 123/86 20 100 % Sitting      Temp Source Heart Rate Source BP Location FiO2 (%) Pain Score    24 0935 24 0935 2435 -- 24 1000    Temporal Monitor Left arm  5      Vitals      Date and Time Temp Pulse SpO2 Resp BP Pain Score FACES Pain Rating User   24 1000 -- 124 100 % 18 131/85 5 -- MB   24 97.4 °F (36.3 °C) 130 100 % 20 123/86 -- -- GP            Physical Exam  Vitals and nursing note reviewed.    Constitutional:       General: She is not in acute distress.     Appearance: She is well-developed.   HENT:      Head: Normocephalic and atraumatic.   Eyes:      Conjunctiva/sclera: Conjunctivae normal.   Cardiovascular:      Rate and Rhythm: Normal rate and regular rhythm.      Heart sounds: No murmur heard.  Pulmonary:      Effort: Pulmonary effort is normal. No respiratory distress.      Breath sounds: Normal breath sounds.   Abdominal:      Palpations: Abdomen is soft.      Tenderness: There is abdominal tenderness.   Musculoskeletal:         General: No swelling.      Cervical back: Neck supple.   Skin:     General: Skin is warm and dry.      Capillary Refill: Capillary refill takes less than 2 seconds.   Neurological:      Mental Status: She is alert.   Psychiatric:         Mood and Affect: Mood normal.         Results Reviewed       Procedure Component Value Units Date/Time    CBC and differential [[id number]] Collected: 11/05/24 1022    Lab Status: No result Specimen: Blood from Arm, Right     Comprehensive metabolic panel [[id number]] Collected: 11/05/24 1022    Lab Status: No result Specimen: Blood from Arm, Right     Lipase [[id number]] Collected: 11/05/24 1022    Lab Status: No result Specimen: Blood from Arm, Right             CT abdomen pelvis with contrast    (Results Pending)       Procedures    ED Medication and Procedure Management   Prior to Admission Medications   Prescriptions Last Dose Informant Patient Reported? Taking?   cyclobenzaprine (FLEXERIL) 10 mg tablet   No No   Sig: Take 1 tablet (10 mg total) by mouth 3 (three) times a day as needed for muscle spasms   dexamethasone (DECADRON) 4 mg tablet   No No   Sig: Take 2 tablets (8 mg total) by mouth 2 (two) times a day with meals START DAY BEFORE CHEMO DAY OF CHEMO AND DAY AFTER CHEMO EVERY 3 WEEKS FOR FOUR CYCLES   enoxaparin (Lovenox) 40 mg/0.4 mL  Self No No   Sig: Inject 0.4 mL (40 mg total) under the skin in the morning for 7 days    Patient not taking: Reported on 10/9/2024   gabapentin (Neurontin) 300 mg capsule  Self No No   Sig: Take 1 capsule (300 mg total) by mouth 3 (three) times a day for 7 days   loratadine (CLARITIN) 10 mg tablet  Self Yes No   Sig: Take 10 mg by mouth as needed for allergies   Patient not taking: Reported on 10/14/2024   ondansetron (ZOFRAN) 8 mg tablet   No No   Sig: Take 1 tablet (8 mg total) by mouth every 8 (eight) hours as needed for nausea or vomiting   Patient not taking: Reported on 10/23/2024   oxyCODONE (Roxicodone) 5 immediate release tablet  Self No No   Sig: Take 1 tablet (5 mg total) by mouth every 6 (six) hours as needed for severe pain Max Daily Amount: 20 mg   Patient not taking: Reported on 10/14/2024      Facility-Administered Medications: None     Patient's Medications   Discharge Prescriptions    No medications on file     No discharge procedures on file.  ED SEPSIS DOCUMENTATION          CKD (GFR = 45-59 mL/min/1.73 square meters)    Stage 3B Moderate CKD (GFR = 30-44 mL/min/1.73 square meters)    Stage 4 Severe CKD (GFR = 15-29 mL/min/1.73 square meters)    Stage 5 End Stage CKD (GFR <15 mL/min/1.73 square meters)  Note: GFR calculation is accurate only with a steady state creatinine            CT abdomen pelvis with contrast   Final Interpretation by Tobias Oropeza MD (11/05 1214)      No acute findings in the abdomen or pelvis.      Cholelithiasis.      Left nephrolithiasis.         Workstation performed: [id number]             Procedures    ED Medication and Procedure Management   Prior to Admission Medications   Prescriptions Last Dose Informant Patient Reported? Taking?   cyclobenzaprine (FLEXERIL) 10 mg tablet   No No   Sig: Take 1 tablet (10 mg total) by mouth 3 (three) times a day as needed for muscle spasms   Patient not taking: Reported on 11/11/2024   dexamethasone (DECADRON) 4 mg tablet   No No   Sig: Take 2 tablets (8 mg total) by mouth 2 (two) times a day with meals START DAY BEFORE CHEMO DAY OF CHEMO AND DAY AFTER CHEMO EVERY 3 WEEKS FOR FOUR CYCLES   Patient taking differently: Take 8 mg by mouth 2 (two) times a day with meals START DAY BEFORE CHEMO DAY OF CHEMO AND DAY AFTER CHEMO EVERY 3 WEEKS FOR FOUR CYCLES   enoxaparin (Lovenox) 40 mg/0.4 mL  Self No No   Sig: Inject 0.4 mL (40 mg total) under the skin in the morning for 7 days   Patient not taking: Reported on 10/9/2024   gabapentin (Neurontin) 300 mg capsule  Self No No   Sig: Take 1 capsule (300 mg total) by mouth 3 (three) times a day for 7 days   loratadine (CLARITIN) 10 mg tablet  Self Yes No   Sig: Take 10 mg by mouth as needed for allergies   Patient not taking: Reported on 10/14/2024   ondansetron (ZOFRAN) 8 mg tablet   No No   Sig: Take 1 tablet (8 mg total) by mouth every 8 (eight) hours as needed for nausea or vomiting   Patient not taking: Reported on 10/23/2024   oxyCODONE (Roxicodone) 5 immediate release  tablet  Self No No   Sig: Take 1 tablet (5 mg total) by mouth every 6 (six) hours as needed for severe pain Max Daily Amount: 20 mg   Patient not taking: Reported on 10/14/2024      Facility-Administered Medications: None     Discharge Medication List as of 11/5/2024  1:08 PM        CONTINUE these medications which have NOT CHANGED    Details   cyclobenzaprine (FLEXERIL) 10 mg tablet Take 1 tablet (10 mg total) by mouth 3 (three) times a day as needed for muscle spasms, Starting Fri 11/1/2024, Normal      dexamethasone (DECADRON) 4 mg tablet Take 2 tablets (8 mg total) by mouth 2 (two) times a day with meals START DAY BEFORE CHEMO DAY OF CHEMO AND DAY AFTER CHEMO EVERY 3 WEEKS FOR FOUR CYCLES, Starting Mon 10/21/2024, Normal      enoxaparin (Lovenox) 40 mg/0.4 mL Inject 0.4 mL (40 mg total) under the skin in the morning for 7 days, Starting Fri 9/20/2024, Until Fri 9/27/2024, Normal      gabapentin (Neurontin) 300 mg capsule Take 1 capsule (300 mg total) by mouth 3 (three) times a day for 7 days, Starting Mon 9/30/2024, Until Wed 10/9/2024, Normal      loratadine (CLARITIN) 10 mg tablet Take 10 mg by mouth as needed for allergies, Historical Med      ondansetron (ZOFRAN) 8 mg tablet Take 1 tablet (8 mg total) by mouth every 8 (eight) hours as needed for nausea or vomiting, Starting Mon 10/21/2024, Normal      oxyCODONE (Roxicodone) 5 immediate release tablet Take 1 tablet (5 mg total) by mouth every 6 (six) hours as needed for severe pain Max Daily Amount: 20 mg, Starting Mon 9/30/2024, Normal           No discharge procedures on file.  ED SEPSIS DOCUMENTATION   Time reflects when diagnosis was documented in both MDM as applicable and the Disposition within this note       Time User Action Codes Description Comment    11/5/2024  1:02 PM Yaz Camacho [R10.9] Abdominal pain                  Yaz Camacho DO  11/17/24 2140

## 2024-11-11 ENCOUNTER — OFFICE VISIT (OUTPATIENT)
Age: 49
End: 2024-11-11

## 2024-11-11 VITALS
HEART RATE: 116 BPM | SYSTOLIC BLOOD PRESSURE: 132 MMHG | DIASTOLIC BLOOD PRESSURE: 97 MMHG | OXYGEN SATURATION: 98 % | BODY MASS INDEX: 25.44 KG/M2 | WEIGHT: 149 LBS | HEIGHT: 64 IN | TEMPERATURE: 98.1 F

## 2024-11-11 DIAGNOSIS — Z48.89 ENCOUNTER FOR FOLLOW-UP CARE INVOLVING PLASTIC SURGERY: Primary | ICD-10-CM

## 2024-11-11 PROCEDURE — 99024 POSTOP FOLLOW-UP VISIT: CPT | Performed by: PHYSICIAN ASSISTANT

## 2024-11-11 NOTE — PROGRESS NOTES
Patient Identification: Stacey Villatoro is a 49 y.o. female     History of Present Illness: The patient is a 49 y.o.  year-old female  who presents to the office for post-op visit. Patient is 53 days s/p Bilateral first stage breast reconstruction and placement of submuscular [place] textured 750 mL Yoel tissue expanders, initial fill 100 mL, reinforcement of inferior pole with Flex HD acellular dermal matrix, bilateral pectoralis block with Exparel, right sided axillary advancement flap, 15 cm x 10 cm, placement of bilateral lori none DME negative pressure wound therapy dressings, less than 50 cm² each, total less than 50 cm² on 9/19/2024.    She presents for tissue expansion. Pt is doing well today, denies wounds, fevers, chills, drainage, sign of infection or significant pain. She has been applying ointment to breasts daily. Wearing bra.  She offers no complaints today. She will need radiation as well. Not interested in PT at this time.     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)    Status post bilateral breast reconstruction    Status post bilateral mastectomy       Current Outpatient Medications:     dexamethasone (DECADRON) 4 mg tablet, Take 2 tablets (8 mg total) by mouth 2 (two) times a day with meals START DAY BEFORE CHEMO DAY OF CHEMO AND DAY AFTER CHEMO EVERY 3 WEEKS FOR FOUR CYCLES (Patient taking differently: Take 8 mg by mouth 2 (two) times a day with meals START DAY BEFORE CHEMO DAY OF CHEMO AND DAY AFTER CHEMO EVERY 3 WEEKS FOR FOUR CYCLES), Disp: 12 tablet, Rfl: 3    cyclobenzaprine (FLEXERIL) 10 mg tablet,  Take 1 tablet (10 mg total) by mouth 3 (three) times a day as needed for muscle spasms (Patient not taking: Reported on 2024), Disp: 30 tablet, Rfl: 0    enoxaparin (Lovenox) 40 mg/0.4 mL, Inject 0.4 mL (40 mg total) under the skin in the morning for 7 days (Patient not taking: Reported on 10/9/2024), Disp: 2.8 mL, Rfl: 0    gabapentin (Neurontin) 300 mg capsule, Take 1 capsule (300 mg total) by mouth 3 (three) times a day for 7 days, Disp: 21 capsule, Rfl: 0    loratadine (CLARITIN) 10 mg tablet, Take 10 mg by mouth as needed for allergies (Patient not taking: Reported on 10/14/2024), Disp: , Rfl:     ondansetron (ZOFRAN) 8 mg tablet, Take 1 tablet (8 mg total) by mouth every 8 (eight) hours as needed for nausea or vomiting (Patient not taking: Reported on 10/23/2024), Disp: 20 tablet, Rfl: 3    oxyCODONE (Roxicodone) 5 immediate release tablet, Take 1 tablet (5 mg total) by mouth every 6 (six) hours as needed for severe pain Max Daily Amount: 20 mg (Patient not taking: Reported on 10/14/2024), Disp: 10 tablet, Rfl: 0    Past Surgical History:   Procedure Laterality Date    BREAST BIOPSY Right 2024     SECTION      x2    FOOT SURGERY      MAMMO NEEDLE LOCALIZATION LEFT (ALL INC) Left 9/3/2024    MRI BREAST BIOPSY LEFT (ALL INCLUSIVE) Left 2024    MRI BREAST BIOPSY RIGHT (ALL INCLUSIVE) Right 2024    CO MAST MODF RAD W/AX LYMPH NOD W/WO PECT/AMALIA MIN Right 2024    Procedure: RIGHT BREAST MODIFIED RADICAL MASTECTOMY LEVEL I AND II LYMPH NODE DISSECTION;  Surgeon: Mert Brink MD;  Location: [facility];  Service: Surgical Oncology    CO MASTECTOMY SIMPLE COMPLETE Left 2024    Procedure: LEFT MASTECTOMY, SAMY CLIPS IN THE RIGHT BREAST/AXILLARY LYMPH NODE AND LEFT BREAST;  Surgeon: Mert Brink MD;  Location: [facility];  Service: Surgical Oncology    CO TISSUE EXPANDER PLACEMENT BREAST RECONSTRUCTION Bilateral 2024    Procedure: FIRST STAGE BILATERAL  BREAST RECONSTRUCTION WITH TISSUE EXPANDERS AND ADM;  Surgeon: Jonathan Archibald MD;  Location: [facility];  Service: Plastics    US GUIDED BREAST BIOPSY RIGHT COMPLETE Right 7/11/2024    US GUIDED BREAST LYMPH NODE BIOPSY RIGHT Right 7/11/2024     Social History     Tobacco Use    Smoking status: Never     Passive exposure: Never    Smokeless tobacco: Never   Substance Use Topics    Alcohol use: Not Currently     Comment: Very rarely, 1 or 2 beers if i do.     Vitals:    11/11/24 0930   BP: 132/97   Pulse: (!) 116   Temp: 98.1 °F (36.7 °C)   SpO2: 98%         Physical Exam  General: NAD, alert  Breasts: Bilateral incisions are clean, dry, and intact. There is no evidence of hematoma or seroma formation.  There is no surrounding erythema, wound breakdown, drainage, or signs of infection.     TE Fill  Procedures:   Under sterile conditions the bilateral tissue expanders were percutaneously accessed without difficulty and 50 ml of NS was injected into the right and 50 ml of NS into the left.  Pt tolerated procedure well.     A ''time out'' was initiated prior to procedure. Non-OR time Out:  Time out was initiated under direction and supervision of provider Casey Booker PA-C  Correct patient identity - Yes  Correct side and site - Yes  Procedure matches verbalized consent -Yes  Correct patient position - Yes  Availability of correct implants and any special equipment or special requirements - Yes  Time out occurred prior to procedure start - Yes     Total TE volume:   Right: 200/750 ml   Left: 200/750 ml      Assessment and Plan: 49 y.o.  year-old female s/p Right Breast Modified Radical Mastectomy Level I And Ii Lymph Node Dissection - Right, Left Mastectomy, Sheron Clips In The Right Breast/axillary Lymph Node And Left Breast - Left, and First Stage Bilateral Breast Reconstruction With Tissue Expanders And Adm - Bilateral       -Recommend vaseline/aquaphor to breast incisions daily.   -Continue wearing surgical  compression bra during the day when active. Patient may begin to ease back into regular activity.  -Patient not interested in PT at this time.  -The patient is to return in 1 week for additional fills.  -The patient is to call the office with any questions or concerns. All of the patient's questions were answered at this time and they agree with the plan of care.      Casey Booker PA-C  Plastic & Reconstructive Surgery

## 2024-11-18 ENCOUNTER — APPOINTMENT (OUTPATIENT)
Dept: LAB | Facility: HOSPITAL | Age: 49
End: 2024-11-18
Payer: COMMERCIAL

## 2024-11-18 ENCOUNTER — OFFICE VISIT (OUTPATIENT)
Age: 49
End: 2024-11-18

## 2024-11-18 VITALS
TEMPERATURE: 98.1 F | HEIGHT: 64 IN | SYSTOLIC BLOOD PRESSURE: 129 MMHG | WEIGHT: 149 LBS | DIASTOLIC BLOOD PRESSURE: 86 MMHG | BODY MASS INDEX: 25.44 KG/M2 | OXYGEN SATURATION: 99 % | HEART RATE: 97 BPM

## 2024-11-18 DIAGNOSIS — C50.511 MALIGNANT NEOPLASM OF LOWER-OUTER QUADRANT OF RIGHT BREAST OF FEMALE, ESTROGEN RECEPTOR POSITIVE (HCC): ICD-10-CM

## 2024-11-18 DIAGNOSIS — Z17.0 MALIGNANT NEOPLASM OF LOWER-OUTER QUADRANT OF RIGHT BREAST OF FEMALE, ESTROGEN RECEPTOR POSITIVE (HCC): ICD-10-CM

## 2024-11-18 DIAGNOSIS — Z48.89 ENCOUNTER FOR FOLLOW-UP CARE INVOLVING PLASTIC SURGERY: Primary | ICD-10-CM

## 2024-11-18 LAB
ALBUMIN SERPL BCG-MCNC: 4.3 G/DL (ref 3.5–5)
ALP SERPL-CCNC: 69 U/L (ref 34–104)
ALT SERPL W P-5'-P-CCNC: 13 U/L (ref 7–52)
ANION GAP SERPL CALCULATED.3IONS-SCNC: 5 MMOL/L (ref 4–13)
AST SERPL W P-5'-P-CCNC: 17 U/L (ref 13–39)
BASOPHILS # BLD AUTO: 0.09 THOUSANDS/ÂΜL (ref 0–0.1)
BASOPHILS NFR BLD AUTO: 2 % (ref 0–1)
BILIRUB SERPL-MCNC: 0.42 MG/DL (ref 0.2–1)
BUN SERPL-MCNC: 11 MG/DL (ref 5–25)
CALCIUM SERPL-MCNC: 9.3 MG/DL (ref 8.4–10.2)
CHLORIDE SERPL-SCNC: 104 MMOL/L (ref 96–108)
CO2 SERPL-SCNC: 29 MMOL/L (ref 21–32)
CREAT SERPL-MCNC: 0.66 MG/DL (ref 0.6–1.3)
EOSINOPHIL # BLD AUTO: 0.01 THOUSAND/ÂΜL (ref 0–0.61)
EOSINOPHIL NFR BLD AUTO: 0 % (ref 0–6)
ERYTHROCYTE [DISTWIDTH] IN BLOOD BY AUTOMATED COUNT: 12.3 % (ref 11.6–15.1)
GFR SERPL CREATININE-BSD FRML MDRD: 105 ML/MIN/1.73SQ M
GLUCOSE P FAST SERPL-MCNC: 91 MG/DL (ref 65–99)
HCT VFR BLD AUTO: 38.7 % (ref 34.8–46.1)
HGB BLD-MCNC: 13 G/DL (ref 11.5–15.4)
IMM GRANULOCYTES # BLD AUTO: 0.04 THOUSAND/UL (ref 0–0.2)
IMM GRANULOCYTES NFR BLD AUTO: 1 % (ref 0–2)
LYMPHOCYTES # BLD AUTO: 0.96 THOUSANDS/ÂΜL (ref 0.6–4.47)
LYMPHOCYTES NFR BLD AUTO: 17 % (ref 14–44)
MCH RBC QN AUTO: 29.8 PG (ref 26.8–34.3)
MCHC RBC AUTO-ENTMCNC: 33.6 G/DL (ref 31.4–37.4)
MCV RBC AUTO: 89 FL (ref 82–98)
MONOCYTES # BLD AUTO: 0.47 THOUSAND/ÂΜL (ref 0.17–1.22)
MONOCYTES NFR BLD AUTO: 8 % (ref 4–12)
NEUTROPHILS # BLD AUTO: 4.06 THOUSANDS/ÂΜL (ref 1.85–7.62)
NEUTS SEG NFR BLD AUTO: 72 % (ref 43–75)
NRBC BLD AUTO-RTO: 0 /100 WBCS
PLATELET # BLD AUTO: 260 THOUSANDS/UL (ref 149–390)
PMV BLD AUTO: 9.7 FL (ref 8.9–12.7)
POTASSIUM SERPL-SCNC: 4.9 MMOL/L (ref 3.5–5.3)
PROT SERPL-MCNC: 6.9 G/DL (ref 6.4–8.4)
RBC # BLD AUTO: 4.36 MILLION/UL (ref 3.81–5.12)
SODIUM SERPL-SCNC: 138 MMOL/L (ref 135–147)
WBC # BLD AUTO: 5.63 THOUSAND/UL (ref 4.31–10.16)

## 2024-11-18 PROCEDURE — 36415 COLL VENOUS BLD VENIPUNCTURE: CPT

## 2024-11-18 PROCEDURE — 80053 COMPREHEN METABOLIC PANEL: CPT

## 2024-11-18 PROCEDURE — 85025 COMPLETE CBC W/AUTO DIFF WBC: CPT

## 2024-11-18 PROCEDURE — 99024 POSTOP FOLLOW-UP VISIT: CPT | Performed by: PHYSICIAN ASSISTANT

## 2024-11-18 NOTE — PROGRESS NOTES
Patient Identification: Stacey Villatoro is a 49 y.o. female     History of Present Illness: The patient is a 49 y.o.  year-old female  who presents to the office for post-op visit. Patient is 60 days s/p Bilateral first stage breast reconstruction and placement of submuscular [place] textured 750 mL Yoel tissue expanders, initial fill 100 mL, reinforcement of inferior pole with Flex HD acellular dermal matrix, bilateral pectoralis block with Exparel, right sided axillary advancement flap, 15 cm x 10 cm, placement of bilateral lori none DME negative pressure wound therapy dressings, less than 50 cm² each, total less than 50 cm² on 9/19/2024.    She presents for tissue expansion. Pt is doing well today, denies wounds, fevers, chills, drainage, sign of infection or significant pain. She has been applying ointment to breasts daily. Wearing bra.  She offers no complaints today. Currently undergoing chemo. She will need radiation as well. Not interested in PT at this time.     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)    Status post bilateral breast reconstruction    Status post bilateral mastectomy       Current Outpatient Medications:     dexamethasone (DECADRON) 4 mg tablet, Take 2 tablets (8 mg total) by mouth 2 (two) times a day with meals START DAY BEFORE CHEMO DAY OF CHEMO AND DAY AFTER CHEMO EVERY 3 WEEKS FOR FOUR CYCLES (Patient taking differently: Take 8 mg by mouth 2 (two) times a day with meals START DAY BEFORE CHEMO DAY OF CHEMO AND DAY AFTER CHEMO EVERY 3 WEEKS FOR FOUR CYCLES), Disp: 12 tablet, Rfl: 3    cyclobenzaprine  (FLEXERIL) 10 mg tablet, Take 1 tablet (10 mg total) by mouth 3 (three) times a day as needed for muscle spasms (Patient not taking: Reported on 2024), Disp: 30 tablet, Rfl: 0    enoxaparin (Lovenox) 40 mg/0.4 mL, Inject 0.4 mL (40 mg total) under the skin in the morning for 7 days (Patient not taking: Reported on 10/9/2024), Disp: 2.8 mL, Rfl: 0    gabapentin (Neurontin) 300 mg capsule, Take 1 capsule (300 mg total) by mouth 3 (three) times a day for 7 days, Disp: 21 capsule, Rfl: 0    loratadine (CLARITIN) 10 mg tablet, Take 10 mg by mouth as needed for allergies (Patient not taking: Reported on 10/14/2024), Disp: , Rfl:     ondansetron (ZOFRAN) 8 mg tablet, Take 1 tablet (8 mg total) by mouth every 8 (eight) hours as needed for nausea or vomiting (Patient not taking: Reported on 2024), Disp: 20 tablet, Rfl: 3    oxyCODONE (Roxicodone) 5 immediate release tablet, Take 1 tablet (5 mg total) by mouth every 6 (six) hours as needed for severe pain Max Daily Amount: 20 mg (Patient not taking: Reported on 10/14/2024), Disp: 10 tablet, Rfl: 0    Past Surgical History:   Procedure Laterality Date    BREAST BIOPSY Right 2024     SECTION      x2    FOOT SURGERY      MAMMO NEEDLE LOCALIZATION LEFT (ALL INC) Left 9/3/2024    MRI BREAST BIOPSY LEFT (ALL INCLUSIVE) Left 2024    MRI BREAST BIOPSY RIGHT (ALL INCLUSIVE) Right 2024    ID MAST MODF RAD W/AX LYMPH NOD W/WO PECT/AMALIA MIN Right 2024    Procedure: RIGHT BREAST MODIFIED RADICAL MASTECTOMY LEVEL I AND II LYMPH NODE DISSECTION;  Surgeon: Mert Brink MD;  Location: [facility];  Service: Surgical Oncology    ID MASTECTOMY SIMPLE COMPLETE Left 2024    Procedure: LEFT MASTECTOMY, SAMY CLIPS IN THE RIGHT BREAST/AXILLARY LYMPH NODE AND LEFT BREAST;  Surgeon: Mert Brink MD;  Location: [facility];  Service: Surgical Oncology    ID TISSUE EXPANDER PLACEMENT BREAST RECONSTRUCTION Bilateral 2024     Procedure: FIRST STAGE BILATERAL BREAST RECONSTRUCTION WITH TISSUE EXPANDERS AND ADM;  Surgeon: Jonathan Archibald MD;  Location: [facility];  Service: Plastics    US GUIDED BREAST BIOPSY RIGHT COMPLETE Right 7/11/2024    US GUIDED BREAST LYMPH NODE BIOPSY RIGHT Right 7/11/2024     Social History     Tobacco Use    Smoking status: Never     Passive exposure: Never    Smokeless tobacco: Never   Substance Use Topics    Alcohol use: Not Currently     Comment: Very rarely, 1 or 2 beers if i do.     Vitals:    11/18/24 1001   BP: 129/86   Pulse: 97   Temp: 98.1 °F (36.7 °C)   SpO2: 99%         Physical Exam  General: NAD, alert  Breasts: Bilateral incisions are clean, dry, and intact. There is no evidence of hematoma or seroma formation.  There is no surrounding erythema, wound breakdown, drainage, or signs of infection.     TE Fill  Procedures:   Under sterile conditions the bilateral tissue expanders were percutaneously accessed without difficulty and 50 ml of NS was injected into the right and 50 ml of NS into the left.  Pt tolerated procedure well.     A ''time out'' was initiated prior to procedure. Non-OR time Out:  Time out was initiated under direction and supervision of provider Casey Booker PA-C  Correct patient identity - Yes  Correct side and site - Yes  Procedure matches verbalized consent -Yes  Correct patient position - Yes  Availability of correct implants and any special equipment or special requirements - Yes  Time out occurred prior to procedure start - Yes     Total TE volume:   Right: 250/750 ml   Left: 250/750 ml      Assessment and Plan: 49 y.o.  year-old female s/p Right Breast Modified Radical Mastectomy Level I And Ii Lymph Node Dissection - Right, Left Mastectomy, Sheron Clips In The Right Breast/axillary Lymph Node And Left Breast - Left, and First Stage Bilateral Breast Reconstruction With Tissue Expanders And Adm - Bilateral       -Recommend vaseline/aquaphor to breast incisions daily.    -Continue wearing surgical compression bra during the day when active. Patient may begin to ease back into regular activity.  -Patient not interested in PT at this time.  -The patient is to return in 1 week for additional fills.  -The patient is to call the office with any questions or concerns. All of the patient's questions were answered at this time and they agree with the plan of care.      Casey Booker PA-C  Plastic & Reconstructive Surgery

## 2024-11-19 RX ORDER — SODIUM CHLORIDE 9 MG/ML
20 INJECTION, SOLUTION INTRAVENOUS ONCE
Status: CANCELLED | OUTPATIENT
Start: 2024-11-21

## 2024-11-20 ENCOUNTER — OFFICE VISIT (OUTPATIENT)
Dept: HEMATOLOGY ONCOLOGY | Facility: CLINIC | Age: 49
End: 2024-11-20
Payer: COMMERCIAL

## 2024-11-20 VITALS
DIASTOLIC BLOOD PRESSURE: 80 MMHG | HEIGHT: 64 IN | RESPIRATION RATE: 18 BRPM | BODY MASS INDEX: 25.95 KG/M2 | OXYGEN SATURATION: 99 % | TEMPERATURE: 97.7 F | SYSTOLIC BLOOD PRESSURE: 124 MMHG | HEART RATE: 120 BPM | WEIGHT: 152 LBS

## 2024-11-20 DIAGNOSIS — T45.1X5A CHEMOTHERAPY INDUCED NEUTROPENIA (HCC): ICD-10-CM

## 2024-11-20 DIAGNOSIS — Z17.0 MALIGNANT NEOPLASM OF LOWER-OUTER QUADRANT OF RIGHT BREAST OF FEMALE, ESTROGEN RECEPTOR POSITIVE (HCC): Primary | ICD-10-CM

## 2024-11-20 DIAGNOSIS — D70.1 CHEMOTHERAPY INDUCED NEUTROPENIA (HCC): ICD-10-CM

## 2024-11-20 DIAGNOSIS — C50.511 MALIGNANT NEOPLASM OF LOWER-OUTER QUADRANT OF RIGHT BREAST OF FEMALE, ESTROGEN RECEPTOR POSITIVE (HCC): Primary | ICD-10-CM

## 2024-11-20 PROCEDURE — 99214 OFFICE O/P EST MOD 30 MIN: CPT | Performed by: INTERNAL MEDICINE

## 2024-11-20 NOTE — PROGRESS NOTES
Hematology/Oncology Outpatient Follow-up  Stacey Villatoro 49 y.o. female 1975 [id number]    Date:  11/20/2024        Assessment and Plan:  1. Malignant neoplasm of lower-outer quadrant of right breast of female, estrogen receptor positive (HCC) (Primary)  Anatomical stage IIIa and pathological stage Ib (pT2, pN2a, cM0, G2), ER positive, MA positive, HER2/divina 1+ negative, 4 /10 involved with metastatic disease with extranodal extension. Oncotype DX recurrence score of 20.    Status post radical bilateral mastectomy on 9/19/2024.  In the left breast mastectomy was found to have noninvasive lobular neoplasia.    She was started on adjuvant chemotherapy with docetaxel and cyclophosphamide cycle 1 on 10/31/2024.    She tolerated the first cycle with some mild reaction to the Taxotere.  This responded nicely to Benadryl and IV dexamethasone.  The patient stated she started the oral dexamethasone today in preparation for cycle 2 of TC tomorrow.  She will be given Benadryl and IV dexamethasone as well to avoid any reaction to the Taxotere.    After the completion of the adjuvant chemotherapy and adjuvant radiation she will be started on one of the aromatase inhibitors along with LHRH agonist for ovarian suppression.  She will be also a candidate for adjuvant abemaciclib for 2 years since she meets high risk criteria.      2. Chemotherapy induced neutropenia (HCC)  She will be supported with Neulasta on pro after each chemotherapy to avoid neutropenic complications.        HPI:  This is a 49-year-old female without significant past medical history.  Family history is negative for breast cancer.  The patient apparently had her first screening mammogram on 6/18/2024 which was read as abnormal with right breast mass at 7 o'clock position 7 cm from the nipple with suspicious lymph adenopathy in the right axilla.  She then had multiple imaging including diagnostic mammogram, ultrasound of the right breast and  ultrasound-guided biopsy of the right breast mass and right axillary lymph node on 7/11/2024.  The pathology was compatible with invasive breast cancer, ER strongly +95%, UT +70%, HER2/divina negative 1+.  CT scan of the chest abdomen pelvis on 7/18/2024 was negative for any obvious signs of metastatic disease.  MRI of the breast bilaterally on 7/19/2024 showed  MPRESSION:   Continued surgical and oncologic management is recommended for the biopsy proven malignancy of the right breast corresponding to a 3.4 cm mass at 7:00 in the right breast and the biopsy proven metastatic right axillary lymph node. There are approximately 4 enlarged right level 1 lymph nodes. No internal mammary adenopathy.      Indeterminate mass at 12:00 and non-mass enhancement at 9:00 in the right breast. If patient is pursuing breast conservation therapy, two site MRI guided biopsy of the right breast is recommended.      Indeterminate non-mass enhancement at 7:00 in the left breast. MRI guided biopsy is recommended.      Biopsy from the left breast at 7 o'clock position showed lobular neoplasia( ALH & LCIS, 4 foci with 5mm largest focus) .  She also had an MRI guided biopsy of the right breast on T 12:00 and 10 o'clock position which came back negative for malignant process.     [facility] molecular screening on 6/4/2024 was negative for obvious genetic alteration.     The patient then underwent bilateral total mastectomy on 9/19/2024 which showed:  Final Diagnosis   A.  Right breast (modified radical mastectomy):     - Invasive breast carcinoma of no special type (ductal NST / invasive ductal carcinoma).      - Tumor size: 34 mm.  Tumor rdgrdrrdarddrderd:rd rd3rd of 3.      - Invasive carcinoma less than 1 mm from posterior margin.      - Separate tumor nodule, 11 mm, most compatible with completely replaced lymph node.     - One (1) additional lymph node, negative for carcinoma.      B.  Levels 1-2, right axilla (dissection):     - Metastatic mammary carcinoma  involving four (4) of ten (10) lymph nodes.      - Tumor deposits measure from 7-20 mm; positive for extranodal extension.     C.  Left breast (mastectomy):     - Breast tissue with non-invasive lobular neoplasia (ALH / LCIS) and atypical ductal hyperplasia (ADH).     - Negative for invasive carcinoma.     D.  New superior margin, right breast (excision):     - Benign skin and subcutaneous tissue.         Oncotype Dx recurrence score came back 20.      Interval history:     The patient came today for a follow-up visit.  She stated that she tolerated the Taxotere and cyclophosphamide first cycle relatively well.  However, she did have a reaction to the Taxotere infusion around the beginning of the treatment which responded to IV dexamethasone and Benadryl.  She did complete the Taxotere infusion for cycle 1.  Recent blood work on 11/18/2024 showed normal CBC and CMP.              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 7 o'clock 7 cm from nipple  Invasive breast carcinoma of no special type (ductal NST/invasive ductal carcinoma)   Grade 2  ER 95  GA 61  HER2 1+  No lymphovascular invasion    B. Right axillary lymph node  Invasive ductal carcinoma of mammary origin, involving fibroadipose tissue, see comment.   Lymphoid tissue is not identified.    C. Right axillary lymph node  Invasive ductal carcinoma of mammary origin, involving fibroadipose tissue, see comment.   Lymphoid tissue is not identified.    Comment: Parts B and C are submitted as axillary lymph node, however no lymphoid tissue is identified. Carcinoma is present with  expression of Jennifer-3 (performed on B/C) supporting the above diagnosis. However, clinical and radiographic correlation is advised as these findings may represent entire andria replacement by metastatic carcinoma or direct extension of the tumor.     Right malignancy appears unifocal. The biopsy-proven carcinoma measured 0.6 cm on ultrasound. There is metastatic disease to a right axillary lymph node.  A second abnormal appearing lymph node is noted in the right axilla (not biopsied). Recent imaging of the contralateral left breast dated 6/18/2024 was reviewed and shows no suspicious findings.      7/18/2024 Genomic Testing    [facility]  A total of 59 genes were evaluated, including: APC, RICKY, BARD 1, BMPR1A, BRCA1, BRCA2, BRIP1, CDH1, CDK4, CDKN1B, CKDN2A, CHEK2, DICER1, FH, FLCN, KIF1B, MAX, MEN1, MET, MLH1, MSH2, MSH6, MUTYH, NBN, NF1, NTHL1, PALB2, PMS2, POT1, PTEN, RAD51C, RAD51D, RB1, RET, SDHA, SDHAF2, SDHB, SDHC, SDHD, SMAD4, SMARCA4, STK11, IFUH908, TP53, TSC1, TSC2, VHL (sequencing and depletion/duplication); AXIN2, CTNNA1, EGLN1, HOXB13, KIT, MITF, MSH3, PDGFRA, POLD1, AND POLE (sequencing only); EPCAM, and GREM1 (depletion/duplication only)  Negative result. No pathogenic sequence variants or deletions/duplications identified     9/19/2024 -  Cancer Staged    Staging form: Breast, AJCC 8th Edition  - Pathologic stage from 9/19/2024: Stage IB (pT2, pN2a, cM0, G2, ER+, ID+, HER2-, Oncotype DX score: 20) - Signed by Ros Hyman MD on 9/26/2024  Stage prefix: Initial diagnosis  Multigene prognostic tests performed: Oncotype DX  Recurrence score range: Greater than or equal to 11  Histologic grading system: 3 grade system       9/19/2024 Surgery    Right modified radical mastectomy left simple mastectomy immediate reconstruction with Dr. Archibald  RIGHT  Invasive breast carcinoma of no special type (ductal NST / invasive ductal carcinoma)   34 mm  Separate tumor nodule, 11 mm, most compatible with completely  replaced lymph node   Grade 2   Invasive carcinoma less than 1 mm from posterior margin   Subdermal invasion by tumor. Perineural invasion by tumor  5/12 Lymph nodes; positive for extranodal extension  ER 91  AZ 61   HER2 1+  Anatomic stage: at least stage IIIA  Prognostic stage: IB    LEFT  Breast tissue with non-invasive lobular neoplasia (ALH / LCIS) and atypical ductal hyperplasia (ADH)      10/31/2024 -  Chemotherapy    alteplase (CATHFLO), 2 mg, Intracatheter, Every 1 Minute as needed, 1 of 4 cycles  pegfilgrastim (NEULASTA ONPRO), 6 mg, Subcutaneous, Once, 1 of 4 cycles  Administration: 6 mg (10/31/2024)  cyclophosphamide (CYTOXAN) IVPB, 1,020 mg, Intravenous, Once, 1 of 4 cycles  Administration: 1,000 mg (10/31/2024)  DOCEtaxel (TAXOTERE) chemo infusion, 75 mg/m2 = 127.6 mg, Intravenous, Once, 1 of 4 cycles  Administration: 127.6 mg (10/31/2024)     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)   7/11/2024 Initial Diagnosis    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)     7/18/2024 Genomic Testing    [facility]  A total of 59 genes were evaluated, including: APC, RICKY, BARD 1, BMPR1A, BRCA1, BRCA2, BRIP1, CDH1, CDK4, CDKN1B, CKDN2A, CHEK2, DICER1, FH, FLCN, KIF1B, MAX, MEN1, MET, MLH1, MSH2, MSH6, MUTYH, NBN, NF1, NTHL1, PALB2, PMS2, POT1, PTEN, RAD51C, RAD51D, RB1, RET, SDHA, SDHAF2, SDHB, SDHC, SDHD, SMAD4, SMARCA4, STK11, EOFO278, TP53, TSC1, TSC2, VHL (sequencing and depletion/duplication); AXIN2, CTNNA1, EGLN1, HOXB13, KIT, MITF, MSH3, PDGFRA, POLD1, AND POLE (sequencing only); EPCAM, and GREM1 (depletion/duplication only)  Negative result. No pathogenic sequence variants or deletions/duplications identified     9/19/2024 Surgery    Right modified radical mastectomy left simple mastectomy immediate reconstruction with Dr. Archibald  RIGHT  Invasive breast carcinoma of no special type (ductal NST / invasive ductal carcinoma)   34 mm  Separate tumor nodule, 11 mm, most  compatible with completely replaced lymph node   Grade 2   Invasive carcinoma less than 1 mm from posterior margin   Subdermal invasion by tumor. Perineural invasion by tumor  5/12 Lymph nodes; positive for extranodal extension  ER 91  CT 61   HER2 1+  Anatomic stage: at least stage IIIA  Prognostic stage: IB    LEFT  Breast tissue with non-invasive lobular neoplasia (ALH / LCIS) and atypical ductal hyperplasia (ADH)          Interval history:    ROS: Review of Systems   Constitutional:  Negative for chills and fever.   HENT:  Negative for ear pain and sore throat.    Eyes:  Negative for pain and visual disturbance.   Respiratory:  Negative for cough and shortness of breath.    Cardiovascular:  Negative for chest pain and palpitations.   Gastrointestinal:  Negative for abdominal pain and vomiting.   Genitourinary:  Negative for dysuria and hematuria.   Musculoskeletal:  Negative for arthralgias and back pain.   Skin:  Negative for color change and rash.   Neurological:  Negative for seizures and syncope.   All other systems reviewed and are negative.      Past Medical History:   Diagnosis Date    Arthritis     BRCA1 negative     BRCA2 negative     Breast cancer (HCC)     Breast mass     Cancer (HCC)        Past Surgical History:   Procedure Laterality Date    BREAST BIOPSY Right 2024     SECTION      x2    FOOT SURGERY      MAMMO NEEDLE LOCALIZATION LEFT (ALL INC) Left 9/3/2024    MRI BREAST BIOPSY LEFT (ALL INCLUSIVE) Left 2024    MRI BREAST BIOPSY RIGHT (ALL INCLUSIVE) Right 2024    CT MAST MODF RAD W/AX LYMPH NOD W/WO PECT/AMALIA MIN Right 2024    Procedure: RIGHT BREAST MODIFIED RADICAL MASTECTOMY LEVEL I AND II LYMPH NODE DISSECTION;  Surgeon: Mert Brink MD;  Location: [facility];  Service: Surgical Oncology    CT MASTECTOMY SIMPLE COMPLETE Left 2024    Procedure: LEFT MASTECTOMY, SAMY CLIPS IN THE RIGHT BREAST/AXILLARY LYMPH NODE AND LEFT BREAST;  Surgeon: Mert  BENJAMIN Brink MD;  Location: [facility];  Service: Surgical Oncology    NH TISSUE EXPANDER PLACEMENT BREAST RECONSTRUCTION Bilateral 9/19/2024    Procedure: FIRST STAGE BILATERAL BREAST RECONSTRUCTION WITH TISSUE EXPANDERS AND ADM;  Surgeon: Jonathan Archibald MD;  Location: [facility];  Service: Plastics    US GUIDED BREAST BIOPSY RIGHT COMPLETE Right 7/11/2024    US GUIDED BREAST LYMPH NODE BIOPSY RIGHT Right 7/11/2024       Social History     Socioeconomic History    Marital status: /Civil Union     Spouse name: None    Number of children: None    Years of education: None    Highest education level: None   Occupational History    None   Tobacco Use    Smoking status: Never     Passive exposure: Never    Smokeless tobacco: Never   Vaping Use    Vaping status: Never Used   Substance and Sexual Activity    Alcohol use: Not Currently     Comment: Very rarely, 1 or 2 beers if i do.    Drug use: No    Sexual activity: Yes     Partners: Male     Comment: Tubes tied 16 years ago   Other Topics Concern    None   Social History Narrative    Lives with  and kids     Work for [employer]      Social Drivers of Health     Financial Resource Strain: Not on file   Food Insecurity: Not on file   Transportation Needs: Not on file   Physical Activity: Not on file   Stress: Not on file   Social Connections: Not on file   Intimate Partner Violence: Not on file   Housing Stability: Not on file       Family History   Problem Relation Age of Onset    Dementia Mother     Lung cancer Mother     Heart disease Father     Skin cancer Father     Heart attack Father         AT THE AGE OF 60    No Known Problems Son     No Known Problems Daughter     No Known Problems Maternal Grandmother     No Known Problems Maternal Grandfather     No Known Problems Paternal Grandmother     No Known Problems Paternal Grandfather     Breast cancer Neg Hx        No Known Allergies      Current Outpatient Medications:     dexamethasone  "(DECADRON) 4 mg tablet, Take 2 tablets (8 mg total) by mouth 2 (two) times a day with meals START DAY BEFORE CHEMO DAY OF CHEMO AND DAY AFTER CHEMO EVERY 3 WEEKS FOR FOUR CYCLES, Disp: 12 tablet, Rfl: 3    cyclobenzaprine (FLEXERIL) 10 mg tablet, Take 1 tablet (10 mg total) by mouth 3 (three) times a day as needed for muscle spasms (Patient not taking: Reported on 11/11/2024), Disp: 30 tablet, Rfl: 0    enoxaparin (Lovenox) 40 mg/0.4 mL, Inject 0.4 mL (40 mg total) under the skin in the morning for 7 days (Patient not taking: Reported on 10/9/2024), Disp: 2.8 mL, Rfl: 0    gabapentin (Neurontin) 300 mg capsule, Take 1 capsule (300 mg total) by mouth 3 (three) times a day for 7 days (Patient not taking: Reported on 11/20/2024), Disp: 21 capsule, Rfl: 0    loratadine (CLARITIN) 10 mg tablet, Take 10 mg by mouth as needed for allergies (Patient not taking: Reported on 10/14/2024), Disp: , Rfl:     ondansetron (ZOFRAN) 8 mg tablet, Take 1 tablet (8 mg total) by mouth every 8 (eight) hours as needed for nausea or vomiting (Patient not taking: Reported on 10/23/2024), Disp: 20 tablet, Rfl: 3    oxyCODONE (Roxicodone) 5 immediate release tablet, Take 1 tablet (5 mg total) by mouth every 6 (six) hours as needed for severe pain Max Daily Amount: 20 mg (Patient not taking: Reported on 10/14/2024), Disp: 10 tablet, Rfl: 0      Physical Exam:  /80 (BP Location: Left arm, Patient Position: Sitting, Cuff Size: Adult)   Pulse (!) 120   Temp 97.7 °F (36.5 °C)   Resp 18   Ht 5' 4\" (1.626 m)   Wt 68.9 kg (152 lb)   LMP 11/05/2024 (Exact Date)   SpO2 99%   BMI 26.09 kg/m²     Physical Exam  Constitutional:       General: She is not in acute distress.     Appearance: She is well-developed. She is not diaphoretic.   HENT:      Head: Normocephalic and atraumatic.      Nose: Nose normal.   Eyes:      General: No scleral icterus.        Right eye: No discharge.         Left eye: No discharge.      Conjunctiva/sclera: " "Conjunctivae normal.      Pupils: Pupils are equal, round, and reactive to light.   Neck:      Thyroid: No thyromegaly.      Vascular: No JVD.      Trachea: No tracheal deviation.   Cardiovascular:      Rate and Rhythm: Normal rate and regular rhythm.      Heart sounds: Normal heart sounds. No murmur heard.     No friction rub.   Pulmonary:      Effort: Pulmonary effort is normal. No respiratory distress.      Breath sounds: Normal breath sounds. No stridor. No wheezing or rales.   Chest:      Chest wall: No tenderness.   Abdominal:      General: There is no distension.      Palpations: Abdomen is soft. There is no hepatomegaly or splenomegaly.      Tenderness: There is no abdominal tenderness. There is no guarding or rebound.   Musculoskeletal:         General: No tenderness or deformity. Normal range of motion.      Cervical back: Normal range of motion and neck supple.   Lymphadenopathy:      Cervical: No cervical adenopathy.   Skin:     General: Skin is warm and dry.      Coloration: Skin is not pale.      Findings: No erythema or rash.   Neurological:      Mental Status: She is alert and oriented to person, place, and time.      Cranial Nerves: No cranial nerve deficit.      Coordination: Coordination normal.      Deep Tendon Reflexes: Reflexes are normal and symmetric.   Psychiatric:         Behavior: Behavior normal.         Thought Content: Thought content normal.         Judgment: Judgment normal.           Labs:  Lab Results   Component Value Date    WBC 5.63 11/18/2024    HGB 13.0 11/18/2024    HCT 38.7 11/18/2024    MCV 89 11/18/2024     11/18/2024     Lab Results   Component Value Date    K 4.9 11/18/2024     11/18/2024    CO2 29 11/18/2024    BUN 11 11/18/2024    CREATININE 0.66 11/18/2024    GLUF 91 11/18/2024    CALCIUM 9.3 11/18/2024    AST 17 11/18/2024    ALT 13 11/18/2024    ALKPHOS 69 11/18/2024    EGFR 105 11/18/2024     No results found for: \"TSH\"    Patient voiced understanding " and agreement in the above discussion. Aware to contact our office with questions/symptoms in the interim.

## 2024-11-21 ENCOUNTER — HOSPITAL ENCOUNTER (OUTPATIENT)
Dept: INFUSION CENTER | Facility: CLINIC | Age: 49
Discharge: HOME | End: 2024-11-21
Payer: COMMERCIAL

## 2024-11-21 VITALS
WEIGHT: 151.2 LBS | SYSTOLIC BLOOD PRESSURE: 149 MMHG | DIASTOLIC BLOOD PRESSURE: 87 MMHG | HEART RATE: 100 BPM | BODY MASS INDEX: 25.19 KG/M2 | TEMPERATURE: 97.7 F | RESPIRATION RATE: 20 BRPM | HEIGHT: 65 IN

## 2024-11-21 DIAGNOSIS — Z17.0 MALIGNANT NEOPLASM OF LOWER-OUTER QUADRANT OF RIGHT BREAST OF FEMALE, ESTROGEN RECEPTOR POSITIVE (HCC): Primary | ICD-10-CM

## 2024-11-21 DIAGNOSIS — C50.511 MALIGNANT NEOPLASM OF LOWER-OUTER QUADRANT OF RIGHT BREAST OF FEMALE, ESTROGEN RECEPTOR POSITIVE (HCC): Primary | ICD-10-CM

## 2024-11-21 PROCEDURE — 96417 CHEMO IV INFUS EACH ADDL SEQ: CPT

## 2024-11-21 PROCEDURE — 96377 APPLICATON ON-BODY INJECTOR: CPT

## 2024-11-21 PROCEDURE — 96367 TX/PROPH/DG ADDL SEQ IV INF: CPT

## 2024-11-21 PROCEDURE — 96413 CHEMO IV INFUSION 1 HR: CPT

## 2024-11-21 RX ORDER — SODIUM CHLORIDE 9 MG/ML
20 INJECTION, SOLUTION INTRAVENOUS ONCE
Status: COMPLETED | OUTPATIENT
Start: 2024-11-21 | End: 2024-11-21

## 2024-11-21 RX ADMIN — PEGFILGRASTIM 6 MG: KIT SUBCUTANEOUS at 15:47

## 2024-11-21 RX ADMIN — CYCLOPHOSPHAMIDE 1000 MG: 1 INJECTION, POWDER, FOR SOLUTION INTRAVENOUS; ORAL at 13:37

## 2024-11-21 RX ADMIN — SODIUM CHLORIDE 20 ML/HR: 0.9 INJECTION, SOLUTION INTRAVENOUS at 10:55

## 2024-11-21 RX ADMIN — DOCETAXEL 127.6 MG: 20 INJECTION, SOLUTION, CONCENTRATE INTRAVENOUS at 12:31

## 2024-11-21 RX ADMIN — DEXAMETHASONE SODIUM PHOSPHATE: 10 INJECTION, SOLUTION INTRAMUSCULAR; INTRAVENOUS at 10:55

## 2024-11-21 RX ADMIN — DIPHENHYDRAMINE HYDROCHLORIDE 25 MG: 50 INJECTION, SOLUTION INTRAMUSCULAR; INTRAVENOUS at 11:41

## 2024-11-21 NOTE — PROGRESS NOTES
Patient presents today for taxotere and cytoxan infusion offering no complaints. Patient tolerated treatment well without complications.Patient tolerated paxmen treatment and 90 minute cool down without complications.  Neulasta applied to patient. AVS declined, Next appointment 12/12 at 10:30

## 2024-11-26 ENCOUNTER — OFFICE VISIT (OUTPATIENT)
Age: 49
End: 2024-11-26

## 2024-11-26 VITALS
HEIGHT: 64 IN | TEMPERATURE: 98.6 F | HEART RATE: 101 BPM | BODY MASS INDEX: 25.78 KG/M2 | WEIGHT: 151 LBS | DIASTOLIC BLOOD PRESSURE: 86 MMHG | OXYGEN SATURATION: 97 % | SYSTOLIC BLOOD PRESSURE: 123 MMHG

## 2024-11-26 DIAGNOSIS — Z48.89 ENCOUNTER FOR FOLLOW-UP CARE INVOLVING PLASTIC SURGERY: Primary | ICD-10-CM

## 2024-11-26 PROCEDURE — 99024 POSTOP FOLLOW-UP VISIT: CPT | Performed by: PHYSICIAN ASSISTANT

## 2024-11-26 NOTE — PROGRESS NOTES
Patient Identification: Stacey Villatoro is a 49 y.o. female     History of Present Illness: The patient is a 49 y.o.  year-old female  who presents to the office for post-op visit. Patient is 68 days s/p Bilateral first stage breast reconstruction and placement of submuscular Avant textured 750 mL Yoel tissue expanders, initial fill 100 mL, reinforcement of inferior pole with Flex HD acellular dermal matrix, bilateral pectoralis block with Exparel, right sided axillary advancement flap, 15 cm x 10 cm, placement of bilateral lori none DME negative pressure wound therapy dressings, less than 50 cm² each, total less than 50 cm² on 9/19/2024.    She presents for tissue expansion. Pt is doing well today, denies wounds, fevers, chills, drainage, sign of infection or significant pain. Wearing bra.  She offers no complaints today. Currently undergoing chemo. She will need radiation as well. Not interested in PT at this time.     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)    Status post bilateral breast reconstruction    Status post bilateral mastectomy    Chemotherapy induced neutropenia (HCC)       Current Outpatient Medications:     cyclobenzaprine (FLEXERIL) 10 mg tablet, Take 1 tablet (10 mg total) by mouth 3 (three) times a day as needed for muscle spasms (Patient not taking: Reported on 11/11/2024), Disp: 30 tablet, Rfl: 0    dexamethasone (DECADRON) 4 mg tablet, Take 2 tablets (8 mg total) by mouth 2 (two) times a day with meals START DAY BEFORE CHEMO DAY OF CHEMO AND DAY AFTER CHEMO EVERY 3 WEEKS FOR FOUR CYCLES (Patient not  taking: Reported on 2024), Disp: 12 tablet, Rfl: 3    enoxaparin (Lovenox) 40 mg/0.4 mL, Inject 0.4 mL (40 mg total) under the skin in the morning for 7 days (Patient not taking: Reported on 10/9/2024), Disp: 2.8 mL, Rfl: 0    gabapentin (Neurontin) 300 mg capsule, Take 1 capsule (300 mg total) by mouth 3 (three) times a day for 7 days (Patient not taking: Reported on 2024), Disp: 21 capsule, Rfl: 0    loratadine (CLARITIN) 10 mg tablet, Take 10 mg by mouth as needed for allergies (Patient not taking: Reported on 10/14/2024), Disp: , Rfl:     ondansetron (ZOFRAN) 8 mg tablet, Take 1 tablet (8 mg total) by mouth every 8 (eight) hours as needed for nausea or vomiting (Patient not taking: Reported on 10/23/2024), Disp: 20 tablet, Rfl: 3    oxyCODONE (Roxicodone) 5 immediate release tablet, Take 1 tablet (5 mg total) by mouth every 6 (six) hours as needed for severe pain Max Daily Amount: 20 mg (Patient not taking: Reported on 10/14/2024), Disp: 10 tablet, Rfl: 0    Past Surgical History:   Procedure Laterality Date    BREAST BIOPSY Right 2024     SECTION      x2    FOOT SURGERY      MAMMO NEEDLE LOCALIZATION LEFT (ALL INC) Left 9/3/2024    MRI BREAST BIOPSY LEFT (ALL INCLUSIVE) Left 2024    MRI BREAST BIOPSY RIGHT (ALL INCLUSIVE) Right 2024    WA MAST MODF RAD W/AX LYMPH NOD W/WO PECT/AMALIA MIN Right 2024    Procedure: RIGHT BREAST MODIFIED RADICAL MASTECTOMY LEVEL I AND II LYMPH NODE DISSECTION;  Surgeon: Mert Brink MD;  Location: [facility];  Service: Surgical Oncology    WA MASTECTOMY SIMPLE COMPLETE Left 2024    Procedure: LEFT MASTECTOMY, SAMY CLIPS IN THE RIGHT BREAST/AXILLARY LYMPH NODE AND LEFT BREAST;  Surgeon: Mert Brink MD;  Location: [facility];  Service: Surgical Oncology    WA TISSUE EXPANDER PLACEMENT BREAST RECONSTRUCTION Bilateral 2024    Procedure: FIRST STAGE BILATERAL BREAST RECONSTRUCTION WITH TISSUE EXPANDERS AND ADM;   Surgeon: Jonathan Archibald MD;  Location: [facility] OR;  Service: Plastics    US GUIDED BREAST BIOPSY RIGHT COMPLETE Right 7/11/2024    US GUIDED BREAST LYMPH NODE BIOPSY RIGHT Right 7/11/2024     Social History     Tobacco Use    Smoking status: Never     Passive exposure: Never    Smokeless tobacco: Never   Substance Use Topics    Alcohol use: Not Currently     Comment: Very rarely, 1 or 2 beers if i do.     Vitals:    11/26/24 1425   BP: 123/86   Pulse: 101   Temp: 98.6 °F (37 °C)   SpO2: 97%         Physical Exam  General: NAD, alert  Breasts: Bilateral incisions are clean, dry, and intact. There is no evidence of hematoma or seroma formation.  There is no surrounding erythema, wound breakdown, drainage, or signs of infection.     TE Fill  Procedures:   Under sterile conditions the bilateral tissue expanders were percutaneously accessed without difficulty and 50 ml of NS was injected into the right and 50 ml of NS into the left.  Pt tolerated procedure well.     A ''time out'' was initiated prior to procedure. Non-OR time Out:  Time out was initiated under direction and supervision of provider Casey Booker PA-C  Correct patient identity - Yes  Correct side and site - Yes  Procedure matches verbalized consent -Yes  Correct patient position - Yes  Availability of correct implants and any special equipment or special requirements - Yes  Time out occurred prior to procedure start - Yes     Total TE volume:   Right: 300/750 ml   Left: 300/750 ml      Assessment and Plan: 49 y.o.  year-old female s/p Right Breast Modified Radical Mastectomy Level I And Ii Lymph Node Dissection - Right, Left Mastectomy, Sheron Clips In The Right Breast/axillary Lymph Node And Left Breast - Left, and First Stage Bilateral Breast Reconstruction With Tissue Expanders And Adm - Bilateral       -Recommend vaseline/aquaphor to breast incisions daily.   -Continue wearing surgical compression bra during the day when active. Patient may  begin to ease back into regular activity.  -Patient not interested in PT at this time.  -The patient is to return in 1 week for additional fills.  -The patient is to call the office with any questions or concerns. All of the patient's questions were answered at this time and they agree with the plan of care.      Casey Booker PA-C  Plastic & Reconstructive Surgery

## 2024-12-03 ENCOUNTER — OFFICE VISIT (OUTPATIENT)
Dept: HEMATOLOGY ONCOLOGY | Facility: CLINIC | Age: 49
End: 2024-12-03
Payer: COMMERCIAL

## 2024-12-03 ENCOUNTER — TELEPHONE (OUTPATIENT)
Dept: HEMATOLOGY ONCOLOGY | Facility: CLINIC | Age: 49
End: 2024-12-03

## 2024-12-03 VITALS
TEMPERATURE: 97.6 F | OXYGEN SATURATION: 99 % | WEIGHT: 152 LBS | RESPIRATION RATE: 18 BRPM | SYSTOLIC BLOOD PRESSURE: 124 MMHG | HEART RATE: 98 BPM | DIASTOLIC BLOOD PRESSURE: 80 MMHG | HEIGHT: 64 IN | BODY MASS INDEX: 25.95 KG/M2

## 2024-12-03 DIAGNOSIS — T45.1X5A CHEMOTHERAPY INDUCED NEUTROPENIA (HCC): ICD-10-CM

## 2024-12-03 DIAGNOSIS — Z17.0 MALIGNANT NEOPLASM OF LOWER-OUTER QUADRANT OF RIGHT BREAST OF FEMALE, ESTROGEN RECEPTOR POSITIVE (HCC): Primary | ICD-10-CM

## 2024-12-03 DIAGNOSIS — C50.511 MALIGNANT NEOPLASM OF LOWER-OUTER QUADRANT OF RIGHT BREAST OF FEMALE, ESTROGEN RECEPTOR POSITIVE (HCC): Primary | ICD-10-CM

## 2024-12-03 DIAGNOSIS — D70.1 CHEMOTHERAPY INDUCED NEUTROPENIA (HCC): ICD-10-CM

## 2024-12-03 PROCEDURE — 99214 OFFICE O/P EST MOD 30 MIN: CPT | Performed by: INTERNAL MEDICINE

## 2024-12-03 NOTE — PROGRESS NOTES
Hematology/Oncology Outpatient Follow-up  Stacey Villatoro 49 y.o. female 1975 [id number]    Date:  12/3/2024        Assessment and Plan:  1. Malignant neoplasm of lower-outer quadrant of right breast of female, estrogen receptor positive (HCC) (Primary)  Anatomical stage IIIa and pathological stage Ib (pT2, pN2a, cM0, G2), ER positive, OR positive, HER2/divina 1+ negative, 4 /10 involved with metastatic disease with extranodal extension. Oncotype DX recurrence score of 20.     Status post radical bilateral mastectomy on 9/19/2024.  In the left breast mastectomy was found to have noninvasive lobular neoplasia.     She was started on adjuvant chemotherapy with docetaxel and cyclophosphamide cycle 1 on 10/31/2024.    She will be due for cycle 3/4 of TC on 12/12/2024.    After the completion of the adjuvant chemotherapy and adjuvant radiation she will be started on one of the aromatase inhibitors along with LHRH agonist for ovarian suppression.The patient seems to be interested in surgical removal of the ovaries which can be done later on after the completion of the adjuvant radiation treatment if desired.   She will be also a candidate for adjuvant abemaciclib for 2 years since she meets high risk criteria.           2. Chemotherapy induced neutropenia (HCC)  She will be supported with Neulasta on pro after each chemotherapy to avoid neutropenic complications.         HPI:  This is a 49-year-old female without significant past medical history.  Family history is negative for breast cancer.  The patient apparently had her first screening mammogram on 6/18/2024 which was read as abnormal with right breast mass at 7 o'clock position 7 cm from the nipple with suspicious lymph adenopathy in the right axilla.  She then had multiple imaging including diagnostic mammogram, ultrasound of the right breast and ultrasound-guided biopsy of the right breast mass and right axillary lymph node on 7/11/2024.  The pathology was  compatible with invasive breast cancer, ER strongly +95%, VT +70%, HER2/divina negative 1+.  CT scan of the chest abdomen pelvis on 7/18/2024 was negative for any obvious signs of metastatic disease.  MRI of the breast bilaterally on 7/19/2024 showed  MPRESSION:   Continued surgical and oncologic management is recommended for the biopsy proven malignancy of the right breast corresponding to a 3.4 cm mass at 7:00 in the right breast and the biopsy proven metastatic right axillary lymph node. There are approximately 4 enlarged right level 1 lymph nodes. No internal mammary adenopathy.      Indeterminate mass at 12:00 and non-mass enhancement at 9:00 in the right breast. If patient is pursuing breast conservation therapy, two site MRI guided biopsy of the right breast is recommended.      Indeterminate non-mass enhancement at 7:00 in the left breast. MRI guided biopsy is recommended.      Biopsy from the left breast at 7 o'clock position showed lobular neoplasia( ALH & LCIS, 4 foci with 5mm largest focus) .  She also had an MRI guided biopsy of the right breast on T 12:00 and 10 o'clock position which came back negative for malignant process.     [facility] molecular screening on 6/4/2024 was negative for obvious genetic alteration.     The patient then underwent bilateral total mastectomy on 9/19/2024 which showed:  Final Diagnosis   A.  Right breast (modified radical mastectomy):     - Invasive breast carcinoma of no special type (ductal NST / invasive ductal carcinoma).      - Tumor size: 34 mm.  Tumor stgstrstastdstest:st st1st of 3.      - Invasive carcinoma less than 1 mm from posterior margin.      - Separate tumor nodule, 11 mm, most compatible with completely replaced lymph node.     - One (1) additional lymph node, negative for carcinoma.      B.  Levels 1-2, right axilla (dissection):     - Metastatic mammary carcinoma involving four (4) of ten (10) lymph nodes.      - Tumor deposits measure from 7-20 mm; positive for extranodal  extension.     C.  Left breast (mastectomy):     - Breast tissue with non-invasive lobular neoplasia (ALH / LCIS) and atypical ductal hyperplasia (ADH).     - Negative for invasive carcinoma.     D.  New superior margin, right breast (excision):     - Benign skin and subcutaneous tissue.         Oncotype Dx recurrence score came back 20.        Interval history:   The patient came today for a follow-up visit.  She is tolerating the current adjuvant chemotherapy with Taxotere and cyclophosphamide relatively better than expected.  Blood work before her cycle 2 on 11/18/2024 showed normal CBC and CMP.    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 7 o'clock 7 cm from nipple  Invasive breast carcinoma of no special type (ductal NST/invasive ductal carcinoma)   Grade 2  ER 95  NJ 61  HER2 1+  No lymphovascular invasion    B. Right axillary lymph node  Invasive ductal carcinoma of mammary origin, involving fibroadipose tissue, see comment.   Lymphoid tissue is not identified.    C. Right axillary lymph node  Invasive ductal carcinoma of mammary origin, involving fibroadipose tissue, see comment.   Lymphoid tissue is not identified.    Comment: Parts B and C are submitted as axillary lymph node, however no lymphoid tissue is identified. Carcinoma is present with expression of Jennifer-3 (performed on B/C) supporting the above diagnosis. However, clinical and radiographic correlation is advised as these findings may represent entire andria replacement by metastatic carcinoma or direct extension of the tumor.     Right malignancy appears unifocal. The  biopsy-proven carcinoma measured 0.6 cm on ultrasound. There is metastatic disease to a right axillary lymph node.  A second abnormal appearing lymph node is noted in the right axilla (not biopsied). Recent imaging of the contralateral left breast dated 6/18/2024 was reviewed and shows no suspicious findings.      7/18/2024 Genomic Testing    [facility]  A total of 59 genes were evaluated, including: APC, RICKY, BARD 1, BMPR1A, BRCA1, BRCA2, BRIP1, CDH1, CDK4, CDKN1B, CKDN2A, CHEK2, DICER1, FH, FLCN, KIF1B, MAX, MEN1, MET, MLH1, MSH2, MSH6, MUTYH, NBN, NF1, NTHL1, PALB2, PMS2, POT1, PTEN, RAD51C, RAD51D, RB1, RET, SDHA, SDHAF2, SDHB, SDHC, SDHD, SMAD4, SMARCA4, STK11, ZZNS755, TP53, TSC1, TSC2, VHL (sequencing and depletion/duplication); AXIN2, CTNNA1, EGLN1, HOXB13, KIT, MITF, MSH3, PDGFRA, POLD1, AND POLE (sequencing only); EPCAM, and GREM1 (depletion/duplication only)  Negative result. No pathogenic sequence variants or deletions/duplications identified     9/19/2024 -  Cancer Staged    Staging form: Breast, AJCC 8th Edition  - Pathologic stage from 9/19/2024: Stage IB (pT2, pN2a, cM0, G2, ER+, WV+, HER2-, Oncotype DX score: 20) - Signed by Ros Hyman MD on 9/26/2024  Stage prefix: Initial diagnosis  Multigene prognostic tests performed: Oncotype DX  Recurrence score range: Greater than or equal to 11  Histologic grading system: 3 grade system       9/19/2024 Surgery    Right modified radical mastectomy left simple mastectomy immediate reconstruction with Dr. Archibald  RIGHT  Invasive breast carcinoma of no special type (ductal NST / invasive ductal carcinoma)   34 mm  Separate tumor nodule, 11 mm, most compatible with completely replaced lymph node   Grade 2   Invasive carcinoma less than 1 mm from posterior margin   Subdermal invasion by tumor. Perineural invasion by tumor  5/12 Lymph nodes; positive for extranodal extension  ER 91  WV 61   HER2 1+  Anatomic stage: at least stage IIIA  Prognostic stage:  IB    LEFT  Breast tissue with non-invasive lobular neoplasia (ALH / LCIS) and atypical ductal hyperplasia (ADH)      10/31/2024 -  Chemotherapy    alteplase (CATHFLO), 2 mg, Intracatheter, Every 1 Minute as needed, 2 of 4 cycles  pegfilgrastim (NEULASTA ONPRO), 6 mg, Subcutaneous, Once, 2 of 4 cycles  Administration: 6 mg (10/31/2024), 6 mg (11/21/2024)  cyclophosphamide (CYTOXAN) IVPB, 1,020 mg, Intravenous, Once, 2 of 4 cycles  Administration: 1,000 mg (10/31/2024), 1,000 mg (11/21/2024)  DOCEtaxel (TAXOTERE) chemo infusion, 75 mg/m2 = 127.6 mg, Intravenous, Once, 2 of 4 cycles  Administration: 127.6 mg (10/31/2024), 127.6 mg (11/21/2024)     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)   7/11/2024 Initial Diagnosis    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)     7/18/2024 Genomic Testing    [facility]  A total of 59 genes were evaluated, including: APC, RICKY, BARD 1, BMPR1A, BRCA1, BRCA2, BRIP1, CDH1, CDK4, CDKN1B, CKDN2A, CHEK2, DICER1, FH, FLCN, KIF1B, MAX, MEN1, MET, MLH1, MSH2, MSH6, MUTYH, NBN, NF1, NTHL1, PALB2, PMS2, POT1, PTEN, RAD51C, RAD51D, RB1, RET, SDHA, SDHAF2, SDHB, SDHC, SDHD, SMAD4, SMARCA4, STK11, AKPO941, TP53, TSC1, TSC2, VHL (sequencing and depletion/duplication); AXIN2, CTNNA1, EGLN1, HOXB13, KIT, MITF, MSH3, PDGFRA, POLD1, AND POLE (sequencing only); EPCAM, and GREM1 (depletion/duplication only)  Negative result. No pathogenic sequence variants or deletions/duplications identified     9/19/2024 Surgery    Right modified radical mastectomy left simple mastectomy immediate reconstruction with Dr. Archibald  RIGHT  Invasive breast carcinoma of no special type (ductal NST / invasive ductal carcinoma)   34 mm  Separate tumor nodule, 11 mm, most compatible with completely replaced lymph node   Grade 2   Invasive carcinoma less than 1 mm from posterior margin   Subdermal invasion by tumor. Perineural invasion by tumor  5/12 Lymph nodes; positive for  extranodal extension  ER 91  CT 61   HER2 1+  Anatomic stage: at least stage IIIA  Prognostic stage: IB    LEFT  Breast tissue with non-invasive lobular neoplasia (ALH / LCIS) and atypical ductal hyperplasia (ADH)          Interval history:    ROS: Review of Systems   Constitutional:  Negative for chills and fever.   HENT:  Negative for ear pain and sore throat.    Eyes:  Negative for pain and visual disturbance.   Respiratory:  Negative for cough and shortness of breath.    Cardiovascular:  Negative for chest pain and palpitations.   Gastrointestinal:  Negative for abdominal pain and vomiting.   Genitourinary:  Negative for dysuria and hematuria.   Musculoskeletal:  Negative for arthralgias and back pain.   Skin:  Negative for color change and rash.   Neurological:  Negative for seizures and syncope.   All other systems reviewed and are negative.      Past Medical History:   Diagnosis Date    Arthritis     BRCA1 negative     BRCA2 negative     Breast cancer (HCC)     Breast mass     Cancer (HCC)        Past Surgical History:   Procedure Laterality Date    BREAST BIOPSY Right 2024     SECTION      x2    FOOT SURGERY      MAMMO NEEDLE LOCALIZATION LEFT (ALL INC) Left 9/3/2024    MRI BREAST BIOPSY LEFT (ALL INCLUSIVE) Left 2024    MRI BREAST BIOPSY RIGHT (ALL INCLUSIVE) Right 2024    CT MAST MODF RAD W/AX LYMPH NOD W/WO PECT/AMALIA MIN Right 2024    Procedure: RIGHT BREAST MODIFIED RADICAL MASTECTOMY LEVEL I AND II LYMPH NODE DISSECTION;  Surgeon: Mert Brink MD;  Location: [facility];  Service: Surgical Oncology    CT MASTECTOMY SIMPLE COMPLETE Left 2024    Procedure: LEFT MASTECTOMY, SAMY CLIPS IN THE RIGHT BREAST/AXILLARY LYMPH NODE AND LEFT BREAST;  Surgeon: Mert Brink MD;  Location: [facility];  Service: Surgical Oncology    CT TISSUE EXPANDER PLACEMENT BREAST RECONSTRUCTION Bilateral 2024    Procedure: FIRST STAGE BILATERAL BREAST RECONSTRUCTION  WITH TISSUE EXPANDERS AND ADM;  Surgeon: Jonathan Archibald MD;  Location: [facility];  Service: Plastics    US GUIDED BREAST BIOPSY RIGHT COMPLETE Right 7/11/2024    US GUIDED BREAST LYMPH NODE BIOPSY RIGHT Right 7/11/2024       Social History     Socioeconomic History    Marital status: /Civil Union     Spouse name: None    Number of children: None    Years of education: None    Highest education level: None   Occupational History    None   Tobacco Use    Smoking status: Never     Passive exposure: Never    Smokeless tobacco: Never   Vaping Use    Vaping status: Never Used   Substance and Sexual Activity    Alcohol use: Not Currently     Comment: Very rarely, 1 or 2 beers if i do.    Drug use: No    Sexual activity: Yes     Partners: Male     Comment: Tubes tied 16 years ago   Other Topics Concern    None   Social History Narrative    Lives with  and kids     Work for [employer]      Social Drivers of Health     Financial Resource Strain: Not on file   Food Insecurity: Not on file   Transportation Needs: Not on file   Physical Activity: Not on file   Stress: Not on file   Social Connections: Not on file   Intimate Partner Violence: Not on file   Housing Stability: Not on file       Family History   Problem Relation Age of Onset    Dementia Mother     Lung cancer Mother     Heart disease Father     Skin cancer Father     Heart attack Father         AT THE AGE OF 60    No Known Problems Son     No Known Problems Daughter     No Known Problems Maternal Grandmother     No Known Problems Maternal Grandfather     No Known Problems Paternal Grandmother     No Known Problems Paternal Grandfather     Breast cancer Neg Hx        No Known Allergies      Current Outpatient Medications:     cyclobenzaprine (FLEXERIL) 10 mg tablet, Take 1 tablet (10 mg total) by mouth 3 (three) times a day as needed for muscle spasms (Patient not taking: Reported on 11/11/2024), Disp: 30 tablet, Rfl: 0    dexamethasone (DECADRON) 4  "mg tablet, Take 2 tablets (8 mg total) by mouth 2 (two) times a day with meals START DAY BEFORE CHEMO DAY OF CHEMO AND DAY AFTER CHEMO EVERY 3 WEEKS FOR FOUR CYCLES (Patient not taking: Reported on 11/26/2024), Disp: 12 tablet, Rfl: 3    enoxaparin (Lovenox) 40 mg/0.4 mL, Inject 0.4 mL (40 mg total) under the skin in the morning for 7 days (Patient not taking: Reported on 10/9/2024), Disp: 2.8 mL, Rfl: 0    gabapentin (Neurontin) 300 mg capsule, Take 1 capsule (300 mg total) by mouth 3 (three) times a day for 7 days (Patient not taking: Reported on 11/20/2024), Disp: 21 capsule, Rfl: 0    loratadine (CLARITIN) 10 mg tablet, Take 10 mg by mouth as needed for allergies (Patient not taking: Reported on 10/14/2024), Disp: , Rfl:     ondansetron (ZOFRAN) 8 mg tablet, Take 1 tablet (8 mg total) by mouth every 8 (eight) hours as needed for nausea or vomiting (Patient not taking: Reported on 10/23/2024), Disp: 20 tablet, Rfl: 3    oxyCODONE (Roxicodone) 5 immediate release tablet, Take 1 tablet (5 mg total) by mouth every 6 (six) hours as needed for severe pain Max Daily Amount: 20 mg (Patient not taking: Reported on 10/14/2024), Disp: 10 tablet, Rfl: 0      Physical Exam:  /80 (BP Location: Left arm, Patient Position: Sitting, Cuff Size: Adult)   Pulse 98   Temp 97.6 °F (36.4 °C)   Resp 18   Ht 5' 4\" (1.626 m)   Wt 68.9 kg (152 lb)   LMP 11/05/2024 (Exact Date)   SpO2 99%   BMI 26.09 kg/m²     Physical Exam  Constitutional:       General: She is not in acute distress.     Appearance: She is well-developed. She is not diaphoretic.   HENT:      Head: Normocephalic and atraumatic.      Nose: Nose normal.   Eyes:      General: No scleral icterus.        Right eye: No discharge.         Left eye: No discharge.      Conjunctiva/sclera: Conjunctivae normal.      Pupils: Pupils are equal, round, and reactive to light.   Neck:      Thyroid: No thyromegaly.      Vascular: No JVD.      Trachea: No tracheal deviation. " "  Cardiovascular:      Rate and Rhythm: Normal rate and regular rhythm.      Heart sounds: Normal heart sounds. No murmur heard.     No friction rub.   Pulmonary:      Effort: Pulmonary effort is normal. No respiratory distress.      Breath sounds: Normal breath sounds. No stridor. No wheezing or rales.   Chest:      Chest wall: No tenderness.   Abdominal:      General: There is no distension.      Palpations: Abdomen is soft. There is no hepatomegaly or splenomegaly.      Tenderness: There is no abdominal tenderness. There is no guarding or rebound.   Musculoskeletal:         General: No tenderness or deformity. Normal range of motion.      Cervical back: Normal range of motion and neck supple.   Lymphadenopathy:      Cervical: No cervical adenopathy.   Skin:     General: Skin is warm and dry.      Coloration: Skin is not pale.      Findings: No erythema or rash.   Neurological:      Mental Status: She is alert and oriented to person, place, and time.      Cranial Nerves: No cranial nerve deficit.      Coordination: Coordination normal.      Deep Tendon Reflexes: Reflexes are normal and symmetric.   Psychiatric:         Behavior: Behavior normal.         Thought Content: Thought content normal.         Judgment: Judgment normal.           Labs:  Lab Results   Component Value Date    WBC 5.63 11/18/2024    HGB 13.0 11/18/2024    HCT 38.7 11/18/2024    MCV 89 11/18/2024     11/18/2024     Lab Results   Component Value Date    K 4.9 11/18/2024     11/18/2024    CO2 29 11/18/2024    BUN 11 11/18/2024    CREATININE 0.66 11/18/2024    GLUF 91 11/18/2024    CALCIUM 9.3 11/18/2024    AST 17 11/18/2024    ALT 13 11/18/2024    ALKPHOS 69 11/18/2024    EGFR 105 11/18/2024     No results found for: \"TSH\"    Patient voiced understanding and agreement in the above discussion. Aware to contact our office with questions/symptoms in the interim.   "

## 2024-12-04 ENCOUNTER — OFFICE VISIT (OUTPATIENT)
Dept: PLASTIC SURGERY | Facility: CLINIC | Age: 49
End: 2024-12-04

## 2024-12-04 VITALS
HEIGHT: 64 IN | TEMPERATURE: 97.8 F | RESPIRATION RATE: 16 BRPM | DIASTOLIC BLOOD PRESSURE: 66 MMHG | OXYGEN SATURATION: 97 % | HEART RATE: 99 BPM | WEIGHT: 153 LBS | SYSTOLIC BLOOD PRESSURE: 108 MMHG | BODY MASS INDEX: 26.12 KG/M2

## 2024-12-04 DIAGNOSIS — Z48.89 ENCOUNTER FOR FOLLOW-UP CARE INVOLVING PLASTIC SURGERY: Primary | ICD-10-CM

## 2024-12-04 PROCEDURE — 99024 POSTOP FOLLOW-UP VISIT: CPT | Performed by: PHYSICIAN ASSISTANT

## 2024-12-04 NOTE — PROGRESS NOTES
Patient Identification: Stacey Villatoro is a 49 y.o. female     History of Present Illness: The patient is a 49 y.o.  year-old female  who presents to the office for post-op visit. Patient is 76 days s/p Bilateral first stage breast reconstruction and placement of submuscular [place] textured 750 mL Yoel tissue expanders, initial fill 100 mL, reinforcement of inferior pole with Flex HD acellular dermal matrix, bilateral pectoralis block with Exparel, right sided axillary advancement flap, 15 cm x 10 cm, placement of bilateral lori none DME negative pressure wound therapy dressings, less than 50 cm² each, total less than 50 cm² on 9/19/2024.    She presents for tissue expansion. Pt is doing well today, denies wounds, fevers, chills, drainage, sign of infection or significant pain. Wearing bra.  She offers no complaints today. Currently undergoing chemo. She will need radiation as well. Not interested in PT at this time.     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)    Status post bilateral breast reconstruction    Status post bilateral mastectomy    Chemotherapy induced neutropenia (HCC)       Current Outpatient Medications:     cyclobenzaprine (FLEXERIL) 10 mg tablet, Take 1 tablet (10 mg total) by mouth 3 (three) times a day as needed for muscle spasms (Patient not taking: Reported on 11/11/2024), Disp: 30 tablet, Rfl: 0    dexamethasone (DECADRON) 4 mg tablet, Take 2 tablets (8 mg total) by mouth 2 (two) times a day with meals START DAY BEFORE CHEMO DAY OF CHEMO AND DAY AFTER CHEMO EVERY 3 WEEKS FOR FOUR CYCLES (Patient not  taking: Reported on 2024), Disp: 12 tablet, Rfl: 3    enoxaparin (Lovenox) 40 mg/0.4 mL, Inject 0.4 mL (40 mg total) under the skin in the morning for 7 days (Patient not taking: Reported on 10/9/2024), Disp: 2.8 mL, Rfl: 0    gabapentin (Neurontin) 300 mg capsule, Take 1 capsule (300 mg total) by mouth 3 (three) times a day for 7 days (Patient not taking: Reported on 2024), Disp: 21 capsule, Rfl: 0    loratadine (CLARITIN) 10 mg tablet, Take 10 mg by mouth as needed for allergies (Patient not taking: Reported on 10/14/2024), Disp: , Rfl:     ondansetron (ZOFRAN) 8 mg tablet, Take 1 tablet (8 mg total) by mouth every 8 (eight) hours as needed for nausea or vomiting (Patient not taking: Reported on 10/23/2024), Disp: 20 tablet, Rfl: 3    oxyCODONE (Roxicodone) 5 immediate release tablet, Take 1 tablet (5 mg total) by mouth every 6 (six) hours as needed for severe pain Max Daily Amount: 20 mg (Patient not taking: Reported on 10/14/2024), Disp: 10 tablet, Rfl: 0    Past Surgical History:   Procedure Laterality Date    BREAST BIOPSY Right 2024     SECTION      x2    FOOT SURGERY      MAMMO NEEDLE LOCALIZATION LEFT (ALL INC) Left 9/3/2024    MRI BREAST BIOPSY LEFT (ALL INCLUSIVE) Left 2024    MRI BREAST BIOPSY RIGHT (ALL INCLUSIVE) Right 2024    CA MAST MODF RAD W/AX LYMPH NOD W/WO PECT/AMALIA MIN Right 2024    Procedure: RIGHT BREAST MODIFIED RADICAL MASTECTOMY LEVEL I AND II LYMPH NODE DISSECTION;  Surgeon: Mert Brink MD;  Location: [facility];  Service: Surgical Oncology    CA MASTECTOMY SIMPLE COMPLETE Left 2024    Procedure: LEFT MASTECTOMY, SAMY CLIPS IN THE RIGHT BREAST/AXILLARY LYMPH NODE AND LEFT BREAST;  Surgeon: Mert Brink MD;  Location: [facility];  Service: Surgical Oncology    CA TISSUE EXPANDER PLACEMENT BREAST RECONSTRUCTION Bilateral 2024    Procedure: FIRST STAGE BILATERAL BREAST RECONSTRUCTION WITH TISSUE EXPANDERS AND ADM;   Surgeon: Jonathan Archibald MD;  Location: [facility];  Service: Plastics    US GUIDED BREAST BIOPSY RIGHT COMPLETE Right 7/11/2024    US GUIDED BREAST LYMPH NODE BIOPSY RIGHT Right 7/11/2024     Social History     Tobacco Use    Smoking status: Never     Passive exposure: Never    Smokeless tobacco: Never   Substance Use Topics    Alcohol use: Not Currently     Comment: Very rarely, 1 or 2 beers if i do.     Vitals:    12/04/24 1105   BP: 108/66   Pulse: 99   Resp: 16   Temp: 97.8 °F (36.6 °C)   SpO2: 97%         Physical Exam  General: NAD, alert  Breasts: Bilateral incisions are clean, dry, and intact. There is no evidence of hematoma or seroma formation.  There is no surrounding erythema, wound breakdown, drainage, or signs of infection.     TE Fill  Procedures:   Under sterile conditions the bilateral tissue expanders were percutaneously accessed without difficulty and 50 ml of NS was injected into the right and 50 ml of NS into the left.  Pt tolerated procedure well.     A ''time out'' was initiated prior to procedure. Non-OR time Out:  Time out was initiated under direction and supervision of provider Casey Booker PA-C  Correct patient identity - Yes  Correct side and site - Yes  Procedure matches verbalized consent -Yes  Correct patient position - Yes  Availability of correct implants and any special equipment or special requirements - Yes  Time out occurred prior to procedure start - Yes     Total TE volume:   Right: 350/750 ml   Left: 350/750 ml      Assessment and Plan: 49 y.o.  year-old female s/p Right Breast Modified Radical Mastectomy Level I And Ii Lymph Node Dissection - Right, Left Mastectomy, Sheron Clips In The Right Breast/axillary Lymph Node And Left Breast - Left, and First Stage Bilateral Breast Reconstruction With Tissue Expanders And Adm - Bilateral       -Recommend vaseline/aquaphor to breast incisions daily.   -Continue wearing surgical compression bra during the day when active.  Patient may begin to ease back into regular activity.  -Patient not interested in PT at this time.  -The patient is to return in 1 week for additional fills.  -The patient is to call the office with any questions or concerns. All of the patient's questions were answered at this time and they agree with the plan of care.      Casey Booker PA-C  Plastic & Reconstructive Surgery

## 2024-12-05 RX ORDER — SODIUM CHLORIDE 9 MG/ML
20 INJECTION, SOLUTION INTRAVENOUS ONCE
Status: CANCELLED | OUTPATIENT
Start: 2024-12-12

## 2024-12-10 ENCOUNTER — APPOINTMENT (OUTPATIENT)
Dept: LAB | Facility: HOSPITAL | Age: 49
End: 2024-12-10
Attending: INTERNAL MEDICINE
Payer: COMMERCIAL

## 2024-12-10 DIAGNOSIS — Z17.0 MALIGNANT NEOPLASM OF LOWER-OUTER QUADRANT OF RIGHT BREAST OF FEMALE, ESTROGEN RECEPTOR POSITIVE (HCC): ICD-10-CM

## 2024-12-10 DIAGNOSIS — C50.511 MALIGNANT NEOPLASM OF LOWER-OUTER QUADRANT OF RIGHT BREAST OF FEMALE, ESTROGEN RECEPTOR POSITIVE (HCC): ICD-10-CM

## 2024-12-10 LAB
ALBUMIN SERPL BCG-MCNC: 3.9 G/DL (ref 3.5–5)
ALP SERPL-CCNC: 59 U/L (ref 34–104)
ALT SERPL W P-5'-P-CCNC: 13 U/L (ref 7–52)
ANION GAP SERPL CALCULATED.3IONS-SCNC: 4 MMOL/L (ref 4–13)
AST SERPL W P-5'-P-CCNC: 16 U/L (ref 13–39)
BASOPHILS # BLD AUTO: 0.08 THOUSANDS/ÂΜL (ref 0–0.1)
BASOPHILS NFR BLD AUTO: 2 % (ref 0–1)
BILIRUB SERPL-MCNC: 0.55 MG/DL (ref 0.2–1)
BUN SERPL-MCNC: 9 MG/DL (ref 5–25)
CALCIUM SERPL-MCNC: 8.8 MG/DL (ref 8.4–10.2)
CHLORIDE SERPL-SCNC: 106 MMOL/L (ref 96–108)
CO2 SERPL-SCNC: 29 MMOL/L (ref 21–32)
CREAT SERPL-MCNC: 0.66 MG/DL (ref 0.6–1.3)
EOSINOPHIL # BLD AUTO: 0.01 THOUSAND/ÂΜL (ref 0–0.61)
EOSINOPHIL NFR BLD AUTO: 0 % (ref 0–6)
ERYTHROCYTE [DISTWIDTH] IN BLOOD BY AUTOMATED COUNT: 14.1 % (ref 11.6–15.1)
GFR SERPL CREATININE-BSD FRML MDRD: 105 ML/MIN/1.73SQ M
GLUCOSE P FAST SERPL-MCNC: 90 MG/DL (ref 65–99)
HCT VFR BLD AUTO: 38.5 % (ref 34.8–46.1)
HGB BLD-MCNC: 12.6 G/DL (ref 11.5–15.4)
IMM GRANULOCYTES # BLD AUTO: 0.02 THOUSAND/UL (ref 0–0.2)
IMM GRANULOCYTES NFR BLD AUTO: 1 % (ref 0–2)
LYMPHOCYTES # BLD AUTO: 0.85 THOUSANDS/ÂΜL (ref 0.6–4.47)
LYMPHOCYTES NFR BLD AUTO: 25 % (ref 14–44)
MCH RBC QN AUTO: 29.7 PG (ref 26.8–34.3)
MCHC RBC AUTO-ENTMCNC: 32.7 G/DL (ref 31.4–37.4)
MCV RBC AUTO: 91 FL (ref 82–98)
MONOCYTES # BLD AUTO: 0.38 THOUSAND/ÂΜL (ref 0.17–1.22)
MONOCYTES NFR BLD AUTO: 11 % (ref 4–12)
NEUTROPHILS # BLD AUTO: 2.07 THOUSANDS/ÂΜL (ref 1.85–7.62)
NEUTS SEG NFR BLD AUTO: 61 % (ref 43–75)
NRBC BLD AUTO-RTO: 0 /100 WBCS
PLATELET # BLD AUTO: 165 THOUSANDS/UL (ref 149–390)
PMV BLD AUTO: 9.9 FL (ref 8.9–12.7)
POTASSIUM SERPL-SCNC: 4.7 MMOL/L (ref 3.5–5.3)
PROT SERPL-MCNC: 6.4 G/DL (ref 6.4–8.4)
RBC # BLD AUTO: 4.24 MILLION/UL (ref 3.81–5.12)
SODIUM SERPL-SCNC: 139 MMOL/L (ref 135–147)
WBC # BLD AUTO: 3.41 THOUSAND/UL (ref 4.31–10.16)

## 2024-12-10 PROCEDURE — 80053 COMPREHEN METABOLIC PANEL: CPT

## 2024-12-10 PROCEDURE — 85025 COMPLETE CBC W/AUTO DIFF WBC: CPT

## 2024-12-10 PROCEDURE — 36415 COLL VENOUS BLD VENIPUNCTURE: CPT

## 2024-12-12 ENCOUNTER — HOSPITAL ENCOUNTER (OUTPATIENT)
Dept: INFUSION CENTER | Facility: CLINIC | Age: 49
Discharge: HOME | End: 2024-12-12
Payer: COMMERCIAL

## 2024-12-12 VITALS
HEART RATE: 105 BPM | HEIGHT: 64 IN | WEIGHT: 153.6 LBS | SYSTOLIC BLOOD PRESSURE: 155 MMHG | TEMPERATURE: 97.2 F | RESPIRATION RATE: 18 BRPM | BODY MASS INDEX: 26.22 KG/M2 | DIASTOLIC BLOOD PRESSURE: 84 MMHG

## 2024-12-12 DIAGNOSIS — Z17.0 MALIGNANT NEOPLASM OF LOWER-OUTER QUADRANT OF RIGHT BREAST OF FEMALE, ESTROGEN RECEPTOR POSITIVE (HCC): Primary | ICD-10-CM

## 2024-12-12 DIAGNOSIS — C50.511 MALIGNANT NEOPLASM OF LOWER-OUTER QUADRANT OF RIGHT BREAST OF FEMALE, ESTROGEN RECEPTOR POSITIVE (HCC): Primary | ICD-10-CM

## 2024-12-12 PROCEDURE — 96377 APPLICATON ON-BODY INJECTOR: CPT

## 2024-12-12 PROCEDURE — 96417 CHEMO IV INFUS EACH ADDL SEQ: CPT

## 2024-12-12 PROCEDURE — 96413 CHEMO IV INFUSION 1 HR: CPT

## 2024-12-12 PROCEDURE — 96367 TX/PROPH/DG ADDL SEQ IV INF: CPT

## 2024-12-12 RX ORDER — SODIUM CHLORIDE 9 MG/ML
20 INJECTION, SOLUTION INTRAVENOUS ONCE
Status: COMPLETED | OUTPATIENT
Start: 2024-12-12 | End: 2024-12-12

## 2024-12-12 RX ADMIN — CYCLOPHOSPHAMIDE 1000 MG: 1 INJECTION, POWDER, FOR SOLUTION INTRAVENOUS; ORAL at 13:46

## 2024-12-12 RX ADMIN — DOCETAXEL 127.6 MG: 20 INJECTION, SOLUTION, CONCENTRATE INTRAVENOUS at 12:15

## 2024-12-12 RX ADMIN — DIPHENHYDRAMINE HYDROCHLORIDE 25 MG: 50 INJECTION, SOLUTION INTRAMUSCULAR; INTRAVENOUS at 11:22

## 2024-12-12 RX ADMIN — SODIUM CHLORIDE 20 ML/HR: 0.9 INJECTION, SOLUTION INTRAVENOUS at 10:31

## 2024-12-12 RX ADMIN — DEXAMETHASONE SODIUM PHOSPHATE: 10 INJECTION, SOLUTION INTRAMUSCULAR; INTRAVENOUS at 10:31

## 2024-12-12 RX ADMIN — PEGFILGRASTIM 6 MG: KIT SUBCUTANEOUS at 16:00

## 2024-12-12 NOTE — PROGRESS NOTES
Pt to clinic for Taxotere + Cytoxan. Pt reports fatigue, nausea, and body aches. Pt advised to use Claritin to help with body aches and pt also has PO meds for nausea at home. Pt also reports a red line approximately 4 inches still visible along the vein that was used for her last chemo infusion on 11/21/24. Pt denies pain but does report that the area around the last IV site was itchy for a few days following her infusion. Office RN, Gretchen Abdullahi notified and reports pt should ice the area to decrease the redness and if the site becomes painful she should let the office know. Tolerated infusion without complications. Paxman machine utilized for treatment without issue. Neulasta placed on pt's R arm with green light blinking. Aware of next appointment on 1/2/24 at 730. AVS declined. PIV removed.

## 2024-12-17 ENCOUNTER — OFFICE VISIT (OUTPATIENT)
Age: 49
End: 2024-12-17

## 2024-12-17 VITALS
SYSTOLIC BLOOD PRESSURE: 128 MMHG | HEIGHT: 64 IN | BODY MASS INDEX: 26.12 KG/M2 | OXYGEN SATURATION: 98 % | TEMPERATURE: 97.9 F | DIASTOLIC BLOOD PRESSURE: 85 MMHG | HEART RATE: 103 BPM | WEIGHT: 153 LBS

## 2024-12-17 DIAGNOSIS — Z48.89 ENCOUNTER FOR FOLLOW-UP CARE INVOLVING PLASTIC SURGERY: Primary | ICD-10-CM

## 2024-12-17 PROCEDURE — 99024 POSTOP FOLLOW-UP VISIT: CPT | Performed by: PHYSICIAN ASSISTANT

## 2024-12-17 NOTE — PROGRESS NOTES
Patient Identification: Stacey Villatoro is a 49 y.o. female     History of Present Illness: The patient is a 49 y.o.  year-old female  who presents to the office for post-op visit. Patient is 89 days s/p Bilateral first stage breast reconstruction and placement of submuscular [place] textured 750 mL Yoel tissue expanders, initial fill 100 mL, reinforcement of inferior pole with Flex HD acellular dermal matrix, bilateral pectoralis block with Exparel, right sided axillary advancement flap, 15 cm x 10 cm, placement of bilateral lori none DME negative pressure wound therapy dressings, less than 50 cm² each, total less than 50 cm² on 9/19/2024.    She presents for tissue expansion. Pt is doing well today, denies wounds, fevers, chills, drainage, sign of infection or significant pain. Wearing bra.  She offers no complaints today. Currently undergoing chemo, has one more treatment beginning of Jan. She will need radiation as well. Not interested in PT at this time.     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)    Status post bilateral breast reconstruction    Status post bilateral mastectomy    Chemotherapy induced neutropenia (HCC)       Current Outpatient Medications:     cyclobenzaprine (FLEXERIL) 10 mg tablet, Take 1 tablet (10 mg total) by mouth 3 (three) times a day as needed for muscle spasms (Patient not taking: Reported on 11/11/2024), Disp: 30 tablet, Rfl: 0    dexamethasone (DECADRON) 4 mg tablet, Take 2 tablets (8 mg total) by mouth 2 (two) times a day with meals START DAY BEFORE CHEMO DAY OF CHEMO AND DAY AFTER CHEMO EVERY  3 WEEKS FOR FOUR CYCLES (Patient not taking: Reported on 2024), Disp: 12 tablet, Rfl: 3    enoxaparin (Lovenox) 40 mg/0.4 mL, Inject 0.4 mL (40 mg total) under the skin in the morning for 7 days (Patient not taking: Reported on 10/9/2024), Disp: 2.8 mL, Rfl: 0    gabapentin (Neurontin) 300 mg capsule, Take 1 capsule (300 mg total) by mouth 3 (three) times a day for 7 days (Patient not taking: Reported on 2024), Disp: 21 capsule, Rfl: 0    loratadine (CLARITIN) 10 mg tablet, Take 10 mg by mouth as needed for allergies (Patient not taking: Reported on 10/14/2024), Disp: , Rfl:     ondansetron (ZOFRAN) 8 mg tablet, Take 1 tablet (8 mg total) by mouth every 8 (eight) hours as needed for nausea or vomiting (Patient not taking: Reported on 10/23/2024), Disp: 20 tablet, Rfl: 3    oxyCODONE (Roxicodone) 5 immediate release tablet, Take 1 tablet (5 mg total) by mouth every 6 (six) hours as needed for severe pain Max Daily Amount: 20 mg (Patient not taking: Reported on 10/14/2024), Disp: 10 tablet, Rfl: 0    Past Surgical History:   Procedure Laterality Date    BREAST BIOPSY Right 2024     SECTION      x2    FOOT SURGERY      MAMMO NEEDLE LOCALIZATION LEFT (ALL INC) Left 9/3/2024    MRI BREAST BIOPSY LEFT (ALL INCLUSIVE) Left 2024    MRI BREAST BIOPSY RIGHT (ALL INCLUSIVE) Right 2024    TN MAST MODF RAD W/AX LYMPH NOD W/WO PECT/AMALIA MIN Right 2024    Procedure: RIGHT BREAST MODIFIED RADICAL MASTECTOMY LEVEL I AND II LYMPH NODE DISSECTION;  Surgeon: Mert Brink MD;  Location: [facility];  Service: Surgical Oncology    TN MASTECTOMY SIMPLE COMPLETE Left 2024    Procedure: LEFT MASTECTOMY, SAMY CLIPS IN THE RIGHT BREAST/AXILLARY LYMPH NODE AND LEFT BREAST;  Surgeon: Mert Brink MD;  Location[facility];  Service: Surgical Oncology    TN TISSUE EXPANDER PLACEMENT BREAST RECONSTRUCTION Bilateral 2024    Procedure: FIRST STAGE BILATERAL BREAST  RECONSTRUCTION WITH TISSUE EXPANDERS AND ADM;  Surgeon: Jonathan Archibald MD;  [facility];  Service: Plastics    US GUIDED BREAST BIOPSY RIGHT COMPLETE Right 7/11/2024    US GUIDED BREAST LYMPH NODE BIOPSY RIGHT Right 7/11/2024     Social History     Tobacco Use    Smoking status: Never     Passive exposure: Never    Smokeless tobacco: Never   Substance Use Topics    Alcohol use: Not Currently     Comment: Very rarely, 1 or 2 beers if i do.     Vitals:    12/17/24 1427   BP: 128/85   Pulse: 103   Temp: 97.9 °F (36.6 °C)   SpO2: 98%         Physical Exam  General: NAD, alert  Breasts: Bilateral incisions are clean, dry, and intact. There is no evidence of hematoma or seroma formation.  There is no surrounding erythema, wound breakdown, drainage, or signs of infection.     TE Fill  Procedures:   Under sterile conditions the bilateral tissue expanders were percutaneously accessed without difficulty and 50 ml of NS was injected into the right and 50 ml of NS into the left.  Pt tolerated procedure well.     A ''time out'' was initiated prior to procedure. Non-OR time Out:  Time out was initiated under direction and supervision of provider Casey Booker PA-C  Correct patient identity - Yes  Correct side and site - Yes  Procedure matches verbalized consent -Yes  Correct patient position - Yes  Availability of correct implants and any special equipment or special requirements - Yes  Time out occurred prior to procedure start - Yes     Total TE volume:   Right: 400/750 ml   Left: 400/750 ml      Assessment and Plan: 49 y.o.  year-old female s/p Right Breast Modified Radical Mastectomy Level I And Ii Lymph Node Dissection - Right, Left Mastectomy, Sheron Clips In The Right Breast/axillary Lymph Node And Left Breast - Left, and First Stage Bilateral Breast Reconstruction With Tissue Expanders And Adm - Bilateral       -Recommend vaseline/aquaphor to breast incisions daily.   -Continue wearing surgical  compression bra during the day when active. Patient may begin to ease back into regular activity.  -Patient not interested in PT at this time.  -The patient is to return in 1 week for additional fills.  -The patient is to call the office with any questions or concerns. All of the patient's questions were answered at this time and they agree with the plan of care.      Casey Booker PA-C  Plastic & Reconstructive Surgery

## 2024-12-19 RX ORDER — SODIUM CHLORIDE 9 MG/ML
20 INJECTION, SOLUTION INTRAVENOUS ONCE
OUTPATIENT
Start: 2025-01-02

## 2024-12-23 ENCOUNTER — OFFICE VISIT (OUTPATIENT)
Dept: PLASTIC SURGERY | Facility: CLINIC | Age: 49
End: 2024-12-23
Payer: COMMERCIAL

## 2024-12-23 VITALS
WEIGHT: 155 LBS | SYSTOLIC BLOOD PRESSURE: 120 MMHG | OXYGEN SATURATION: 97 % | HEIGHT: 64 IN | DIASTOLIC BLOOD PRESSURE: 80 MMHG | RESPIRATION RATE: 16 BRPM | TEMPERATURE: 97.3 F | BODY MASS INDEX: 26.46 KG/M2 | HEART RATE: 90 BPM

## 2024-12-23 DIAGNOSIS — Z48.89 ENCOUNTER FOR FOLLOW-UP CARE INVOLVING PLASTIC SURGERY: Primary | ICD-10-CM

## 2024-12-23 PROCEDURE — 99213 OFFICE O/P EST LOW 20 MIN: CPT | Performed by: PHYSICIAN ASSISTANT

## 2024-12-23 NOTE — PROGRESS NOTES
Patient Identification: Stacey Villatoro is a 49 y.o. female     History of Present Illness: The patient is a 49 y.o.  year-old female  who presents to the office for post-op visit. Patient is 95 days s/p Bilateral first stage breast reconstruction and placement of submuscular [place] textured 750 mL Yoel tissue expanders, initial fill 100 mL, reinforcement of inferior pole with Flex HD acellular dermal matrix, bilateral pectoralis block with Exparel, right sided axillary advancement flap, 15 cm x 10 cm, placement of bilateral lori none DME negative pressure wound therapy dressings, less than 50 cm² each, total less than 50 cm² on 9/19/2024.    She presents for tissue expansion. Pt is doing well today, denies wounds, fevers, chills, drainage, sign of infection or significant pain. Wearing bra.  She offers no complaints today. Currently undergoing chemo, has one more treatment beginning of Jan. She will need radiation as well. Not interested in PT at this time.     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)    Status post bilateral breast reconstruction    Status post bilateral mastectomy    Chemotherapy induced neutropenia (HCC)       Current Outpatient Medications:     cyclobenzaprine (FLEXERIL) 10 mg tablet, Take 1 tablet (10 mg total) by mouth 3 (three) times a day as needed for muscle spasms (Patient not taking: Reported on 11/11/2024), Disp: 30 tablet, Rfl: 0    dexamethasone (DECADRON) 4 mg tablet, Take 2 tablets (8 mg total) by mouth 2 (two) times a day with meals START DAY BEFORE CHEMO DAY OF CHEMO AND DAY AFTER CHEMO EVERY  3 WEEKS FOR FOUR CYCLES (Patient not taking: Reported on 2024), Disp: 12 tablet, Rfl: 3    enoxaparin (Lovenox) 40 mg/0.4 mL, Inject 0.4 mL (40 mg total) under the skin in the morning for 7 days (Patient not taking: Reported on 10/9/2024), Disp: 2.8 mL, Rfl: 0    gabapentin (Neurontin) 300 mg capsule, Take 1 capsule (300 mg total) by mouth 3 (three) times a day for 7 days (Patient not taking: Reported on 2024), Disp: 21 capsule, Rfl: 0    loratadine (CLARITIN) 10 mg tablet, Take 10 mg by mouth as needed for allergies (Patient not taking: Reported on 10/14/2024), Disp: , Rfl:     ondansetron (ZOFRAN) 8 mg tablet, Take 1 tablet (8 mg total) by mouth every 8 (eight) hours as needed for nausea or vomiting (Patient not taking: Reported on 10/23/2024), Disp: 20 tablet, Rfl: 3    oxyCODONE (Roxicodone) 5 immediate release tablet, Take 1 tablet (5 mg total) by mouth every 6 (six) hours as needed for severe pain Max Daily Amount: 20 mg (Patient not taking: Reported on 10/14/2024), Disp: 10 tablet, Rfl: 0    Past Surgical History:   Procedure Laterality Date    BREAST BIOPSY Right 2024     SECTION      x2    FOOT SURGERY      MAMMO NEEDLE LOCALIZATION LEFT (ALL INC) Left 9/3/2024    MRI BREAST BIOPSY LEFT (ALL INCLUSIVE) Left 2024    MRI BREAST BIOPSY RIGHT (ALL INCLUSIVE) Right 2024    SC MAST MODF RAD W/AX LYMPH NOD W/WO PECT/AMALIA MIN Right 2024    Procedure: RIGHT BREAST MODIFIED RADICAL MASTECTOMY LEVEL I AND II LYMPH NODE DISSECTION;  Surgeon: Mert Brink MD;  [facility];  Service: Surgical Oncology    SC MASTECTOMY SIMPLE COMPLETE Left 2024    Procedure: LEFT MASTECTOMY, SAMY CLIPS IN THE RIGHT BREAST/AXILLARY LYMPH NODE AND LEFT BREAST;  Surgeon: Mert Brink MD;  [facility];  Service: Surgical Oncology    SC TISSUE EXPANDER PLACEMENT BREAST RECONSTRUCTION Bilateral 2024    Procedure: FIRST STAGE BILATERAL BREAST  RECONSTRUCTION WITH TISSUE EXPANDERS AND ADM;  Surgeon: Jonathan Archibald MD;  [facility];  Service: Plastics    US GUIDED BREAST BIOPSY RIGHT COMPLETE Right 7/11/2024    US GUIDED BREAST LYMPH NODE BIOPSY RIGHT Right 7/11/2024     Social History     Tobacco Use    Smoking status: Never     Passive exposure: Never    Smokeless tobacco: Never   Substance Use Topics    Alcohol use: Not Currently     Comment: Very rarely, 1 or 2 beers if i do.     Vitals:    12/23/24 0856   BP: 120/80   Pulse: 90   Resp: 16   Temp: (!) 97.3 °F (36.3 °C)   SpO2: 97%         Physical Exam  General: NAD, alert  Breasts: Bilateral incisions are clean, dry, and intact. There is no evidence of hematoma or seroma formation.  There is no surrounding erythema, wound breakdown, drainage, or signs of infection.     TE Fill  Procedures:   Under sterile conditions the bilateral tissue expanders were percutaneously accessed without difficulty and 100 ml of NS was injected into the right and 100 ml of NS into the left.  Pt tolerated procedure well.     A ''time out'' was initiated prior to procedure. Non-OR time Out:  Time out was initiated under direction and supervision of provider Casey Booker PA-C  Correct patient identity - Yes  Correct side and site - Yes  Procedure matches verbalized consent -Yes  Correct patient position - Yes  Availability of correct implants and any special equipment or special requirements - Yes  Time out occurred prior to procedure start - Yes     Total TE volume:   Right: 500/750 ml   Left: 500/750 ml      Assessment and Plan: 49 y.o.  year-old female s/p Right Breast Modified Radical Mastectomy Level I And Ii Lymph Node Dissection - Right, Left Mastectomy, Sheron Clips In The Right Breast/axillary Lymph Node And Left Breast - Left, and First Stage Bilateral Breast Reconstruction With Tissue Expanders And Adm - Bilateral       -Recommend vaseline/aquaphor to breast incisions daily.   -Continue wearing  surgical compression bra during the day when active. Patient may begin to ease back into regular activity.  -Patient not interested in PT at this time.  -The patient is to return in 1 week for additional fills.  -The patient is to call the office with any questions or concerns. All of the patient's questions were answered at this time and they agree with the plan of care.      I have spent a total time of 25 minutes in caring for this patient on the day of the visit/encounter including Instructions for management, Patient and family education, and Documenting in the medical record.    Casey Taylor PA-C  Plastic & Reconstructive Surgery

## 2024-12-30 ENCOUNTER — OFFICE VISIT (OUTPATIENT)
Age: 49
End: 2024-12-30
Payer: COMMERCIAL

## 2024-12-30 ENCOUNTER — APPOINTMENT (OUTPATIENT)
Dept: LAB | Facility: HOSPITAL | Age: 49
End: 2024-12-30
Attending: INTERNAL MEDICINE
Payer: COMMERCIAL

## 2024-12-30 VITALS
TEMPERATURE: 98.2 F | SYSTOLIC BLOOD PRESSURE: 122 MMHG | OXYGEN SATURATION: 98 % | DIASTOLIC BLOOD PRESSURE: 78 MMHG | BODY MASS INDEX: 26.46 KG/M2 | WEIGHT: 155 LBS | HEIGHT: 64 IN

## 2024-12-30 DIAGNOSIS — C50.511 MALIGNANT NEOPLASM OF LOWER-OUTER QUADRANT OF RIGHT BREAST OF FEMALE, ESTROGEN RECEPTOR POSITIVE (HCC): ICD-10-CM

## 2024-12-30 DIAGNOSIS — Z48.89 ENCOUNTER FOR FOLLOW-UP CARE INVOLVING PLASTIC SURGERY: Primary | ICD-10-CM

## 2024-12-30 DIAGNOSIS — Z17.0 MALIGNANT NEOPLASM OF LOWER-OUTER QUADRANT OF RIGHT BREAST OF FEMALE, ESTROGEN RECEPTOR POSITIVE (HCC): ICD-10-CM

## 2024-12-30 LAB
ALBUMIN SERPL BCG-MCNC: 4 G/DL (ref 3.5–5)
ALP SERPL-CCNC: 58 U/L (ref 34–104)
ALT SERPL W P-5'-P-CCNC: 16 U/L (ref 7–52)
ANION GAP SERPL CALCULATED.3IONS-SCNC: 3 MMOL/L (ref 4–13)
AST SERPL W P-5'-P-CCNC: 18 U/L (ref 13–39)
BASOPHILS # BLD AUTO: 0.08 THOUSANDS/ΜL (ref 0–0.1)
BASOPHILS NFR BLD AUTO: 2 % (ref 0–1)
BILIRUB SERPL-MCNC: 0.41 MG/DL (ref 0.2–1)
BUN SERPL-MCNC: 14 MG/DL (ref 5–25)
CALCIUM SERPL-MCNC: 8.9 MG/DL (ref 8.4–10.2)
CHLORIDE SERPL-SCNC: 106 MMOL/L (ref 96–108)
CO2 SERPL-SCNC: 30 MMOL/L (ref 21–32)
CREAT SERPL-MCNC: 0.58 MG/DL (ref 0.6–1.3)
EOSINOPHIL # BLD AUTO: 0.02 THOUSAND/ΜL (ref 0–0.61)
EOSINOPHIL NFR BLD AUTO: 1 % (ref 0–6)
ERYTHROCYTE [DISTWIDTH] IN BLOOD BY AUTOMATED COUNT: 14.8 % (ref 11.6–15.1)
GFR SERPL CREATININE-BSD FRML MDRD: 110 ML/MIN/1.73SQ M
GLUCOSE P FAST SERPL-MCNC: 80 MG/DL (ref 65–99)
HCT VFR BLD AUTO: 37.7 % (ref 34.8–46.1)
HGB BLD-MCNC: 12 G/DL (ref 11.5–15.4)
IMM GRANULOCYTES # BLD AUTO: 0.03 THOUSAND/UL (ref 0–0.2)
IMM GRANULOCYTES NFR BLD AUTO: 1 % (ref 0–2)
LYMPHOCYTES # BLD AUTO: 0.91 THOUSANDS/ΜL (ref 0.6–4.47)
LYMPHOCYTES NFR BLD AUTO: 25 % (ref 14–44)
MCH RBC QN AUTO: 30.1 PG (ref 26.8–34.3)
MCHC RBC AUTO-ENTMCNC: 31.8 G/DL (ref 31.4–37.4)
MCV RBC AUTO: 95 FL (ref 82–98)
MONOCYTES # BLD AUTO: 0.54 THOUSAND/ΜL (ref 0.17–1.22)
MONOCYTES NFR BLD AUTO: 15 % (ref 4–12)
NEUTROPHILS # BLD AUTO: 2.1 THOUSANDS/ΜL (ref 1.85–7.62)
NEUTS SEG NFR BLD AUTO: 56 % (ref 43–75)
NRBC BLD AUTO-RTO: 0 /100 WBCS
PLATELET # BLD AUTO: 166 THOUSANDS/UL (ref 149–390)
PMV BLD AUTO: 10.7 FL (ref 8.9–12.7)
POTASSIUM SERPL-SCNC: 4.5 MMOL/L (ref 3.5–5.3)
PROT SERPL-MCNC: 6.1 G/DL (ref 6.4–8.4)
RBC # BLD AUTO: 3.99 MILLION/UL (ref 3.81–5.12)
SODIUM SERPL-SCNC: 139 MMOL/L (ref 135–147)
WBC # BLD AUTO: 3.68 THOUSAND/UL (ref 4.31–10.16)

## 2024-12-30 PROCEDURE — 80053 COMPREHEN METABOLIC PANEL: CPT

## 2024-12-30 PROCEDURE — 36415 COLL VENOUS BLD VENIPUNCTURE: CPT

## 2024-12-30 PROCEDURE — 99213 OFFICE O/P EST LOW 20 MIN: CPT | Performed by: PHYSICIAN ASSISTANT

## 2024-12-30 PROCEDURE — 85025 COMPLETE CBC W/AUTO DIFF WBC: CPT

## 2024-12-30 NOTE — PROGRESS NOTES
Patient Identification: Stacey Villatoro is a 49 y.o. female     History of Present Illness: The patient is a 49 y.o.  year-old female  who presents to the office for post-op visit. Patient is 102 days s/p Bilateral first stage breast reconstruction and placement of submuscular Aldie textured 750 mL Yoel tissue expanders, initial fill 100 mL, reinforcement of inferior pole with Flex HD acellular dermal matrix, bilateral pectoralis block with Exparel, right sided axillary advancement flap, 15 cm x 10 cm, placement of bilateral lori none DME negative pressure wound therapy dressings, less than 50 cm² each, total less than 50 cm² on 9/19/2024.    She presents for tissue expansion. Pt is doing well today, denies wounds, fevers, chills, drainage, sign of infection or significant pain. Wearing bra.  She offers no complaints today. Currently undergoing chemo, has one more treatment beginning of Jan. She will need radiation as well. Not interested in PT at this time. Desires implant based recon.     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)    Status post bilateral breast reconstruction    Status post bilateral mastectomy    Chemotherapy induced neutropenia (HCC)       Current Outpatient Medications:     cyclobenzaprine (FLEXERIL) 10 mg tablet, Take 1 tablet (10 mg total) by mouth 3 (three) times a day as needed for muscle spasms (Patient not taking: Reported on 11/11/2024), Disp: 30 tablet, Rfl: 0    dexamethasone (DECADRON) 4 mg tablet, Take 2 tablets (8 mg total) by mouth 2 (two) times a day with meals START DAY BEFORE CHEMO DAY OF  CHEMO AND DAY AFTER CHEMO EVERY 3 WEEKS FOR FOUR CYCLES (Patient not taking: Reported on 2024), Disp: 12 tablet, Rfl: 3    enoxaparin (Lovenox) 40 mg/0.4 mL, Inject 0.4 mL (40 mg total) under the skin in the morning for 7 days (Patient not taking: Reported on 10/9/2024), Disp: 2.8 mL, Rfl: 0    gabapentin (Neurontin) 300 mg capsule, Take 1 capsule (300 mg total) by mouth 3 (three) times a day for 7 days (Patient not taking: Reported on 2024), Disp: 21 capsule, Rfl: 0    loratadine (CLARITIN) 10 mg tablet, Take 10 mg by mouth as needed for allergies (Patient not taking: Reported on 10/14/2024), Disp: , Rfl:     ondansetron (ZOFRAN) 8 mg tablet, Take 1 tablet (8 mg total) by mouth every 8 (eight) hours as needed for nausea or vomiting (Patient not taking: Reported on 10/23/2024), Disp: 20 tablet, Rfl: 3    oxyCODONE (Roxicodone) 5 immediate release tablet, Take 1 tablet (5 mg total) by mouth every 6 (six) hours as needed for severe pain Max Daily Amount: 20 mg (Patient not taking: Reported on 10/14/2024), Disp: 10 tablet, Rfl: 0    Past Surgical History:   Procedure Laterality Date    BREAST BIOPSY Right 2024     SECTION      x2    FOOT SURGERY      MAMMO NEEDLE LOCALIZATION LEFT (ALL INC) Left 9/3/2024    MRI BREAST BIOPSY LEFT (ALL INCLUSIVE) Left 2024    MRI BREAST BIOPSY RIGHT (ALL INCLUSIVE) Right 2024    NC MAST MODF RAD W/AX LYMPH NOD W/WO PECT/AMALIA MIN Right 2024    Procedure: RIGHT BREAST MODIFIED RADICAL MASTECTOMY LEVEL I AND II LYMPH NODE DISSECTION;  Surgeon: Mert Brink MD;  Location: [facility];  Service: Surgical Oncology    NC MASTECTOMY SIMPLE COMPLETE Left 2024    Procedure: LEFT MASTECTOMY, SAMY CLIPS IN THE RIGHT BREAST/AXILLARY LYMPH NODE AND LEFT BREAST;  Surgeon: Mert Brink MD;  Location: [facility];  Service: Surgical Oncology    NC TISSUE EXPANDER PLACEMENT BREAST RECONSTRUCTION Bilateral 2024    Procedure: FIRST  STAGE BILATERAL BREAST RECONSTRUCTION WITH TISSUE EXPANDERS AND ADM;  Surgeon: Jonathan Archibald MD;  Location: [facility];  Service: Plastics    US GUIDED BREAST BIOPSY RIGHT COMPLETE Right 7/11/2024    US GUIDED BREAST LYMPH NODE BIOPSY RIGHT Right 7/11/2024     Social History     Tobacco Use    Smoking status: Never     Passive exposure: Never    Smokeless tobacco: Never   Substance Use Topics    Alcohol use: Not Currently     Comment: Very rarely, 1 or 2 beers if i do.     Vitals:    12/30/24 0833   BP: 122/78   Temp: 98.2 °F (36.8 °C)   SpO2: 98%         Physical Exam  General: NAD, alert  Breasts: Bilateral incisions are clean, dry, and intact. There is no evidence of hematoma or seroma formation.  There is no surrounding erythema, wound breakdown, drainage, or signs of infection.     TE Fill  Procedures:   Under sterile conditions the bilateral tissue expanders were percutaneously accessed without difficulty and 100 ml of NS was injected into the right and 100 ml of NS into the left.  Pt tolerated procedure well.     A ''time out'' was initiated prior to procedure. Non-OR time Out:  Time out was initiated under direction and supervision of provider Casey Booker PA-C  Correct patient identity - Yes  Correct side and site - Yes  Procedure matches verbalized consent -Yes  Correct patient position - Yes  Availability of correct implants and any special equipment or special requirements - Yes  Time out occurred prior to procedure start - Yes     Total TE volume:   Right: 600/750 ml   Left: 600/750 ml      Assessment and Plan: 49 y.o.  year-old female s/p Right Breast Modified Radical Mastectomy Level I And Ii Lymph Node Dissection - Right, Left Mastectomy, Sheron Clips In The Right Breast/axillary Lymph Node And Left Breast - Left, and First Stage Bilateral Breast Reconstruction With Tissue Expanders And Adm - Bilateral       -Recommend vaseline/aquaphor to breast incisions daily.   -Continue wearing  surgical compression bra during the day when active. Patient may begin to ease back into regular activity.  -Patient not interested in PT at this time.  -The patient is to return in 1 week for additional fills.  -The patient is to call the office with any questions or concerns. All of the patient's questions were answered at this time and they agree with the plan of care.      I have spent a total time of 25 minutes in caring for this patient on the day of the visit/encounter including Instructions for management, Patient and family education, and Documenting in the medical record.      Casey Taylor PA-C  Plastic & Reconstructive Surgery

## 2025-01-02 ENCOUNTER — HOSPITAL ENCOUNTER (OUTPATIENT)
Dept: INFUSION CENTER | Facility: CLINIC | Age: 50
Discharge: HOME | End: 2025-01-02
Payer: COMMERCIAL

## 2025-01-02 VITALS
DIASTOLIC BLOOD PRESSURE: 73 MMHG | HEART RATE: 101 BPM | TEMPERATURE: 97.7 F | HEIGHT: 65 IN | RESPIRATION RATE: 18 BRPM | WEIGHT: 158.4 LBS | BODY MASS INDEX: 26.39 KG/M2 | SYSTOLIC BLOOD PRESSURE: 146 MMHG

## 2025-01-02 DIAGNOSIS — Z17.0 MALIGNANT NEOPLASM OF LOWER-OUTER QUADRANT OF RIGHT BREAST OF FEMALE, ESTROGEN RECEPTOR POSITIVE (HCC): Primary | ICD-10-CM

## 2025-01-02 DIAGNOSIS — C50.511 MALIGNANT NEOPLASM OF LOWER-OUTER QUADRANT OF RIGHT BREAST OF FEMALE, ESTROGEN RECEPTOR POSITIVE (HCC): Primary | ICD-10-CM

## 2025-01-02 PROCEDURE — 96413 CHEMO IV INFUSION 1 HR: CPT

## 2025-01-02 PROCEDURE — 96377 APPLICATON ON-BODY INJECTOR: CPT

## 2025-01-02 PROCEDURE — 96367 TX/PROPH/DG ADDL SEQ IV INF: CPT

## 2025-01-02 PROCEDURE — 96417 CHEMO IV INFUS EACH ADDL SEQ: CPT

## 2025-01-02 RX ORDER — SODIUM CHLORIDE 9 MG/ML
20 INJECTION, SOLUTION INTRAVENOUS ONCE
Status: COMPLETED | OUTPATIENT
Start: 2025-01-02 | End: 2025-01-02

## 2025-01-02 RX ADMIN — CYCLOPHOSPHAMIDE 1000 MG: 1 INJECTION, POWDER, FOR SOLUTION INTRAVENOUS; ORAL at 10:45

## 2025-01-02 RX ADMIN — DOCETAXEL 127.6 MG: 20 INJECTION, SOLUTION, CONCENTRATE INTRAVENOUS at 09:39

## 2025-01-02 RX ADMIN — PEGFILGRASTIM 6 MG: KIT SUBCUTANEOUS at 11:42

## 2025-01-02 RX ADMIN — DEXAMETHASONE SODIUM PHOSPHATE: 10 INJECTION, SOLUTION INTRAMUSCULAR; INTRAVENOUS at 09:07

## 2025-01-02 RX ADMIN — SODIUM CHLORIDE 20 ML/HR: 0.9 INJECTION, SOLUTION INTRAVENOUS at 08:18

## 2025-01-02 RX ADMIN — DIPHENHYDRAMINE HYDROCHLORIDE 25 MG: 50 INJECTION, SOLUTION INTRAMUSCULAR; INTRAVENOUS at 08:35

## 2025-01-02 NOTE — PROGRESS NOTES
Pt here for her last chemo,  offers no complaints, VS stable,  Pt aware that this is her last treatment and to F/U with her MD. Pt continues w/ radiation,  AVS declined. Infusion completed w/o complications, pt D/C home

## 2025-01-06 ENCOUNTER — TELEPHONE (OUTPATIENT)
Age: 50
End: 2025-01-06

## 2025-01-06 PROCEDURE — 77263 THER RADIOLOGY TX PLNG CPLX: CPT | Performed by: RADIOLOGY

## 2025-01-06 NOTE — TELEPHONE ENCOUNTER
Patient called to reschedule her appointment today at 9:00am with Casey because she just had radiation and is not feeling well .   She would like to be seen this week but I was unable to find an appointment for her .   Please advise .

## 2025-01-06 NOTE — TELEPHONE ENCOUNTER
Spoke with patient, advised that it is okay to skip this week per Casey. Patient confirmed appt for next week 1/13/25.

## 2025-01-08 ENCOUNTER — APPOINTMENT (OUTPATIENT)
Dept: RADIATION ONCOLOGY | Facility: CLINIC | Age: 50
End: 2025-01-08
Payer: COMMERCIAL

## 2025-01-08 ENCOUNTER — TELEPHONE (OUTPATIENT)
Dept: PLASTIC SURGERY | Facility: CLINIC | Age: 50
End: 2025-01-08

## 2025-01-08 ENCOUNTER — APPOINTMENT (OUTPATIENT)
Dept: RADIATION ONCOLOGY | Facility: HOSPITAL | Age: 50
End: 2025-01-08
Attending: RADIOLOGY
Payer: COMMERCIAL

## 2025-01-08 DIAGNOSIS — C50.911 CARCINOMA OF RIGHT BREAST METASTATIC TO AXILLARY LYMPH NODE (HCC): Primary | ICD-10-CM

## 2025-01-08 DIAGNOSIS — Z90.13 STATUS POST BILATERAL MASTECTOMY: ICD-10-CM

## 2025-01-08 DIAGNOSIS — C77.3 CARCINOMA OF RIGHT BREAST METASTATIC TO AXILLARY LYMPH NODE (HCC): Primary | ICD-10-CM

## 2025-01-08 DIAGNOSIS — Z17.0 MALIGNANT NEOPLASM OF LOWER-OUTER QUADRANT OF RIGHT BREAST OF FEMALE, ESTROGEN RECEPTOR POSITIVE (HCC): ICD-10-CM

## 2025-01-08 DIAGNOSIS — C50.511 MALIGNANT NEOPLASM OF LOWER-OUTER QUADRANT OF RIGHT BREAST OF FEMALE, ESTROGEN RECEPTOR POSITIVE (HCC): ICD-10-CM

## 2025-01-08 DIAGNOSIS — Z98.890 STATUS POST BILATERAL BREAST RECONSTRUCTION: ICD-10-CM

## 2025-01-08 PROCEDURE — 77334 RADIATION TREATMENT AID(S): CPT | Performed by: RADIOLOGY

## 2025-01-08 PROCEDURE — 99243 OFF/OP CNSLTJ NEW/EST LOW 30: CPT | Performed by: RADIOLOGY

## 2025-01-08 PROCEDURE — 77290 THER RAD SIMULAJ FIELD CPLX: CPT | Performed by: RADIOLOGY

## 2025-01-08 NOTE — ASSESSMENT & PLAN NOTE
Patient has completed adjuvant chemotherapy and is ready to proceed with adjuvant postmastectomy radiation for node positive right breast cancer at high risk for local regional recurrence.  I have counseled the patient that once we perform CT simulation today she may have no further tissue expander fills, as this will change her anatomy and require restimulation for radiation treatment.  Patient expressed understanding of this recommendation and wished to proceed today with CT simulation for radiation treatment planning.  I also counseled her that typically an interval of at least 6 months after radiation is required to recover prior to any additional reconstruction procedures.

## 2025-01-08 NOTE — ASSESSMENT & PLAN NOTE
Ms. Villatoro is a 49-year-old premenopausal woman who had routine screening mammograms that showed abnormality in the right breast and axilla, further imaging and biopsy confirming a clinical stage T2 cancer in the breast with multiple positive axillary nodes seen on imaging, biopsy-proven grade 2 invasive ductal carcinoma that is strongly estrogen and progesterone receptor positive and HER2 negative.  Oncotype recurrence score is 20.  She has undergone bilateral mastectomy with right axillary dissection and bilateral tissue expander placement by Dr Brink and Dr. Archibald on September 19, 2024.  Final pathology shows a 3.4 cm grade 2 invasive ductal carcinoma with negative but close posterior margin of excision, extensive lymphovascular invasion and 5 of 12 axillary nodes with macrometastases and extranodal extension. Pathologic stage pT2 pN2a cM0, anatomic stage IIIA, prognostic stage IB.  She completed 4 cycles of adjuvant cyclophosphamide and docetaxel on January 2, 2025.  She tolerated chemotherapy with expected toxicities.    I reviewed with the patient that postmastectomy radiation is strongly indicated to reduce the risk of local regional recurrence given the extent of disease in the breast and in particular the large number of axillary nodes with macrometastases and extensive lymphovascular invasion, which are risk factors placing her at high risk for local regional recurrence.  Radiation to the right chest wall and comprehensive regional nodes is indicated as standard of care.  I reviewed the procedure involved in radiation to the right chest wall postmastectomy.  I reviewed the potential acute long-term side effects.  Acutely she may experience localized skin irritation, fatigue.  She is at risk for lymphedema given the use of axillary dissection and additional regional andria radiation.  Patient expressed understanding these recommendations.  She is in agreement with proceeding with postmastectomy  radiation.  Informed consent was reviewed by me and signed by the patient today.  CT simulation was then performed.

## 2025-01-08 NOTE — PROGRESS NOTES
Follow-up Visit   Name: Stacey Villatoro      : 1975      MRN: [id number]  Encounter Provider: Mickie La MD  Encounter Date: 2025   Encounter department: [facility] RADIATION ONCOLOGY  :  Assessment & Plan  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)    Malignant neoplasm of lower-outer quadrant of right breast of female, estrogen receptor positive (HCC)    Ms. Villatoro is a 49-year-old premenopausal woman who had routine screening mammograms that showed abnormality in the right breast and axilla, further imaging and biopsy confirming a clinical stage T2 cancer in the breast with multiple positive axillary nodes seen on imaging, biopsy-proven grade 2 invasive ductal carcinoma that is strongly estrogen and progesterone receptor positive and HER2 negative.  Oncotype recurrence score is 20.  She has undergone bilateral mastectomy with right axillary dissection and bilateral tissue expander placement by Dr Brink and Dr. Archibald on 2024.  Final pathology shows a 3.4 cm grade 2 invasive ductal carcinoma with negative but close posterior margin of excision, extensive lymphovascular invasion and 5 of 12 axillary nodes with macrometastases and extranodal extension. Pathologic stage pT2 pN2a cM0, anatomic stage IIIA, prognostic stage IB.  She completed 4 cycles of adjuvant cyclophosphamide and docetaxel on 2025.  She tolerated chemotherapy with expected toxicities.    I reviewed with the patient that postmastectomy radiation is strongly indicated to reduce the risk of local regional recurrence given the extent of disease in the breast and in particular the large number of axillary nodes with macrometastases and extensive lymphovascular invasion, which are risk factors placing her at high risk for local regional recurrence.  Radiation to the right chest wall and comprehensive regional nodes is indicated as standard of care.  I reviewed the  procedure involved in radiation to the right chest wall postmastectomy.  I reviewed the potential acute long-term side effects.  Acutely she may experience localized skin irritation, fatigue.  She is at risk for lymphedema given the use of axillary dissection and additional regional andria radiation.  Patient expressed understanding these recommendations.  She is in agreement with proceeding with postmastectomy radiation.  Informed consent was reviewed by me and signed by the patient today.  CT simulation was then performed.         Status post bilateral mastectomy    Status post bilateral breast reconstruction    Patient has completed adjuvant chemotherapy and is ready to proceed with adjuvant postmastectomy radiation for node positive right breast cancer at high risk for local regional recurrence.  I have counseled the patient that once we perform CT simulation today she may have no further tissue expander fills, as this will change her anatomy and require restimulation for radiation treatment.  Patient expressed understanding of this recommendation and wished to proceed today with CT simulation for radiation treatment planning.  I also counseled her that typically an interval of at least 6 months after radiation is required to recover prior to any additional reconstruction procedures.         History of Present Illness   Chief Complaint   Patient presents with    Follow-up     Radiation oncology      Pertinent Medical History     Ms. Villatoro is a 49-year-old premenopausal woman who had routine screening mammograms on June 18, 2024.  This study showed 2 adjacent irregular masses in the central region of the right breast with at least 2 prominent right axillary lymph nodes, no findings on the left.  She underwent diagnostic imaging on July 11, 2024 which showed an irregular mass in the lower central right breast measuring 3.5 cm with spiculated margins, 7 o'clock position 7 cm from the nipple and again 2 abnormal  appearing lymph nodes in the right axilla, BI-RADS Category 5.  Biopsy of the right breast and lymph node on that date showed grade 2 invasive ductal carcinoma that was estrogen receptor 95%, progesterone receptor 70% and HER2 negative.  Axillary biopsy was positive for metastatic invasive ductal carcinoma.  On July 18, 2024 she had a CT of the chest, abdomen and pelvis that showed no sign of metastatic disease.  Bone scan on July 24, 2024 showed no osseous metastases.  She had bilateral breast MRI on July 19, 2024 that showed an irregular spiculated mass in the lower outer right breast 7 o'clock position measuring 3.4 cm, non-mass enhancement measuring 1.8 cm in a linear distribution in the outer central right breast, an irregular mass with internal enhancement in the upper central right breast at 12:00 and at least 4 enlarged right level 1 axillary lymph nodes with no internal mammary adenopathy.  In the left breast there was non-mass enhancement in a focal distribution at the 7 o'clock position.  She underwent additional biopsies including an MRI guided biopsy of left breast abnormality on August 9, pathology showed ALH and LCIS.  Biopsies on August 16, 2024 of the right breast 12 o'clock position showed dense stromal fibrosis with no malignancy and from the 10 o'clock position showed columnar cell hyperplasia with dense fibrosis, no malignancy.  Genetic testing showed no pathogenic variants.      She underwent bilateral mastectomy with right axillary dissection and immediate tissue expander placement by Dr. Brink and Dr. Archibald on September 19, 2024.  She did well postoperatively with no specific complications.  Pathology from the left breast showed ALH, LCIS and ADH with no invasive carcinoma.  Pathology from the right breast showed a 3.4 cm grade 2 invasive ductal carcinoma with DCIS present, extensive lymphovascular invasion present, margins negative but less than 1 mm from the posterior margin and 5 of 12  axillary nodes with macrometastases, the largest measuring 2 cm with extranodal extension.  Oncotype recurrence score was 20.  Pathologic stage pT2 pN2a cM0, anatomic stage IIIA, prognostic stage IB.  She was seen in consultation by Dr. Hyman in medical oncology who recommended adjuvant chemotherapy despite the intermediate risk Oncotype recurrence score due to the high burden of andria metastasis.    She completed 4 cycles of adjuvant chemotherapy consisting of cyclophosphamide and docetaxel from October 31, 2024 to January 2, 2025.  She states that chemotherapy made her quite ill for 2 to 3 days after infusion.  She had a reaction to the first dose which required premedication thereafter which she was able to tolerate and continue chemotherapy as planned.  She returns today for reevaluation, to review indications for postmastectomy radiation, to review informed consent and undergo CT simulation.      Oncology History   Cancer Staging   Malignant neoplasm of lower-outer quadrant of right breast of female, estrogen receptor positive (HCC)  Staging form: Breast, AJCC 8th Edition  - Pathologic stage from 9/19/2024: Stage IB (pT2, pN2a, cM0, G2, ER+, NH+, HER2-, Oncotype DX score: 20) - Signed by Ros Hyman MD on 9/26/2024  Stage prefix: Initial diagnosis  Multigene prognostic tests performed: Oncotype DX  Recurrence score range: Greater than or equal to 11  Histologic grading system: 3 grade system  Oncology History   Malignant neoplasm of lower-outer quadrant of right breast of female, estrogen receptor positive (HCC)   7/11/2024 Initial Diagnosis    Malignant neoplasm of lower-outer quadrant of right breast of female, estrogen receptor positive (HCC)     7/11/2024 Initial Diagnosis    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)     7/11/2024 Biopsy    Right breast ultrasound guided biopsy  A. 7 o'clock 7 cm from nipple  Invasive breast carcinoma of no special type (ductal NST/invasive ductal  carcinoma)   Grade 2  ER 95  MO 61  HER2 1+  No lymphovascular invasion    B. Right axillary lymph node  Invasive ductal carcinoma of mammary origin, involving fibroadipose tissue, see comment.   Lymphoid tissue is not identified.    C. Right axillary lymph node  Invasive ductal carcinoma of mammary origin, involving fibroadipose tissue, see comment.   Lymphoid tissue is not identified.    Comment: Parts B and C are submitted as axillary lymph node, however no lymphoid tissue is identified. Carcinoma is present with expression of Jennifer-3 (performed on B/C) supporting the above diagnosis. However, clinical and radiographic correlation is advised as these findings may represent entire andria replacement by metastatic carcinoma or direct extension of the tumor.     Right malignancy appears unifocal. The biopsy-proven carcinoma measured 0.6 cm on ultrasound. There is metastatic disease to a right axillary lymph node.  A second abnormal appearing lymph node is noted in the right axilla (not biopsied). Recent imaging of the contralateral left breast dated 6/18/2024 was reviewed and shows no suspicious findings.      7/18/2024 Genomic Testing    [facility]  A total of 59 genes were evaluated, including: APC, RICKY, BARD 1, BMPR1A, BRCA1, BRCA2, BRIP1, CDH1, CDK4, CDKN1B, CKDN2A, CHEK2, DICER1, FH, FLCN, KIF1B, MAX, MEN1, MET, MLH1, MSH2, MSH6, MUTYH, NBN, NF1, NTHL1, PALB2, PMS2, POT1, PTEN, RAD51C, RAD51D, RB1, RET, SDHA, SDHAF2, SDHB, SDHC, SDHD, SMAD4, SMARCA4, STK11, DLBT241, TP53, TSC1, TSC2, VHL (sequencing and depletion/duplication); AXIN2, CTNNA1, EGLN1, HOXB13, KIT, MITF, MSH3, PDGFRA, POLD1, AND POLE (sequencing only); EPCAM, and GREM1 (depletion/duplication only)  Negative result. No pathogenic sequence variants or deletions/duplications identified     9/19/2024 -  Cancer Staged    Staging form: Breast, AJCC 8th Edition  - Pathologic stage from 9/19/2024: Stage IB (pT2, pN2a, cM0, G2, ER+, MO+, HER2-, Oncotype DX score:  20) - Signed by Ros Hyman MD on 9/26/2024  Stage prefix: Initial diagnosis  Multigene prognostic tests performed: Oncotype DX  Recurrence score range: Greater than or equal to 11  Histologic grading system: 3 grade system       9/19/2024 Surgery    Right modified radical mastectomy left simple mastectomy immediate reconstruction with Dr. Archibald  RIGHT  Invasive breast carcinoma of no special type (ductal NST / invasive ductal carcinoma)   34 mm  Separate tumor nodule, 11 mm, most compatible with completely replaced lymph node   Grade 2   Invasive carcinoma less than 1 mm from posterior margin   Subdermal invasion by tumor. Perineural invasion by tumor  5/12 Lymph nodes; positive for extranodal extension  ER 91  AR 61   HER2 1+  Anatomic stage: at least stage IIIA  Prognostic stage: IB    LEFT  Breast tissue with non-invasive lobular neoplasia (ALH / LCIS) and atypical ductal hyperplasia (ADH)      10/31/2024 -  Chemotherapy    alteplase (CATHFLO), 2 mg, Intracatheter, Every 1 Minute as needed, 4 of 4 cycles  pegfilgrastim (NEULASTA ONPRO), 6 mg, Subcutaneous, Once, 4 of 4 cycles  Administration: 6 mg (10/31/2024), 6 mg (11/21/2024), 6 mg (12/12/2024)  cyclophosphamide (CYTOXAN) IVPB, 1,020 mg, Intravenous, Once, 4 of 4 cycles  Administration: 1,000 mg (10/31/2024), 1,000 mg (11/21/2024), 1,000 mg (12/12/2024)  DOCEtaxel (TAXOTERE) chemo infusion, 75 mg/m2 = 127.6 mg, Intravenous, Once, 4 of 4 cycles  Administration: 127.6 mg (10/31/2024), 127.6 mg (11/21/2024), 127.6 mg (12/12/2024)     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)   7/11/2024 Initial Diagnosis    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)     7/18/2024 Genomic Testing    [facility]  A total of 59 genes were evaluated, including: APC, RICKY, BARD 1, BMPR1A, BRCA1, BRCA2, BRIP1, CDH1, CDK4, CDKN1B, CKDN2A, CHEK2, DICER1, FH, FLCN, KIF1B, MAX, MEN1, MET, MLH1, MSH2, MSH6, MUTYH, NBN, NF1, NTHL1, PALB2, PMS2,  POT1, PTEN, RAD51C, RAD51D, RB1, RET, SDHA, SDHAF2, SDHB, SDHC, SDHD, SMAD4, SMARCA4, STK11, TTII884, TP53, TSC1, TSC2, VHL (sequencing and depletion/duplication); AXIN2, CTNNA1, EGLN1, HOXB13, KIT, MITF, MSH3, PDGFRA, POLD1, AND POLE (sequencing only); EPCAM, and GREM1 (depletion/duplication only)  Negative result. No pathogenic sequence variants or deletions/duplications identified     9/19/2024 Surgery    Right modified radical mastectomy left simple mastectomy immediate reconstruction with Dr. Archibald  RIGHT  Invasive breast carcinoma of no special type (ductal NST / invasive ductal carcinoma)   34 mm  Separate tumor nodule, 11 mm, most compatible with completely replaced lymph node   Grade 2   Invasive carcinoma less than 1 mm from posterior margin   Subdermal invasion by tumor. Perineural invasion by tumor  5/12 Lymph nodes; positive for extranodal extension  ER 91  TX 61   HER2 1+  Anatomic stage: at least stage IIIA  Prognostic stage: IB    LEFT  Breast tissue with non-invasive lobular neoplasia (ALH / LCIS) and atypical ductal hyperplasia (ADH)         Review of Systems Refer to nursing note.        Objective   There were no vitals taken for this visit.    Pain Screening:     ECOG  0  Physical Exam  Vitals and nursing note reviewed.   Constitutional:       General: She is not in acute distress.     Appearance: Normal appearance.   HENT:      Nose: No congestion.   Eyes:      General: No scleral icterus.     Extraocular Movements: Extraocular movements intact.      Pupils: Pupils are equal, round, and reactive to light.   Pulmonary:      Effort: Pulmonary effort is normal. No respiratory distress.   Chest:   Breasts:     Right: Absent.      Left: Absent.          Comments: Bilateral chest wall with well-healed mastectomy incisions, bilateral tissue expanders in place, no palpable mass or other skin changes.  Musculoskeletal:         General: No swelling. Normal range of motion.      Cervical back: Normal  "range of motion.   Lymphadenopathy:      Cervical: No cervical adenopathy.      Upper Body:      Right upper body: No supraclavicular or axillary adenopathy.      Left upper body: No supraclavicular or axillary adenopathy.   Skin:     General: Skin is warm and dry.   Neurological:      General: No focal deficit present.      Mental Status: She is alert and oriented to person, place, and time.   Psychiatric:         Mood and Affect: Mood normal.         Thought Content: Thought content normal.         Judgment: Judgment normal.          Administrative Statements   I have spent a total time of 30 minutes in caring for this patient on the day of the visit/encounter including Diagnostic results, Prognosis, Risks and benefits of tx options, Instructions for management, Patient and family education, Risk factor reductions, Impressions, Counseling / Coordination of care, Documenting in the medical record, Reviewing / ordering tests, medicine, procedures  , and Obtaining or reviewing history  . Topics discussed with the patient / family include symptom assessment and management.  Portions of the record may have been created with voice recognition software.  Occasional wrong word or \"sound a like\" substitutions may have occurred due to the inherent limitations of voice recognition software.  Read the chart carefully and recognize, using context, where substitutions have occurred.  "

## 2025-01-08 NOTE — TELEPHONE ENCOUNTER
Spoke with patient and let her know Casey is in surgery all day today but she would be in the office tomorrow. Let her know I would forward the message for Casey to follow up. Pt verbalized understanding.

## 2025-01-08 NOTE — PROGRESS NOTES
Stacey Villatoro 1975 is a 49 y.o. female  Referred by Dr. Brink for invasive right breast cancer ER/OR positive, HER2 negative s/p right modified radical mastectomy and left simple mastectomy with immediate reconstruction.      Patient went for her first screening mammogram in June 2024 that revealed  2 adjacent irregularly shaped masses seen in the lower central region of the right breast in the posterior depth. She went for diagnostic imaging and biopsy of right breast revealing invasive breast carcinoma, grade 2, ER 95%, OR 61%, HER 2 negative, with right axillary metastasis. Staging workup revealed no distant disease. She also underwent bilateral breast MRI and MRI guided biopsy both breast right breast additional biopsies are benign. Left breast biopsy is consistent with atypical lobular hyperplasia. She underwent Right modified radical mastectomy left simple mastectomy immediate reconstruction with Dr Brink and Dr Archibald on 9/19/24.  She consulted with Dr. Hyman and she is scheduled to start adjuvant chemotherapy with Taxotere and cyclophosphamide (TC) every 3-weeks for total of 4 cycles, on 10/31/24.   Pt returns today for SIM.     Follow up visit     Oncology History   Malignant neoplasm of lower-outer quadrant of right breast of female, estrogen receptor positive (HCC)   7/11/2024 Initial Diagnosis    Malignant neoplasm of lower-outer quadrant of right breast of female, estrogen receptor positive (HCC)     7/11/2024 Initial Diagnosis    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)     7/11/2024 Biopsy    Right breast ultrasound guided biopsy  A. 7 o'clock 7 cm from nipple  Invasive breast carcinoma of no special type (ductal NST/invasive ductal carcinoma)   Grade 2  ER 95  OR 61  HER2 1+  No lymphovascular invasion    B. Right axillary lymph node  Invasive ductal carcinoma of mammary origin, involving fibroadipose tissue, see comment.   Lymphoid tissue is not identified.    C.  Right axillary lymph node  Invasive ductal carcinoma of mammary origin, involving fibroadipose tissue, see comment.   Lymphoid tissue is not identified.    Comment: Parts B and C are submitted as axillary lymph node, however no lymphoid tissue is identified. Carcinoma is present with expression of Jennifer-3 (performed on B/C) supporting the above diagnosis. However, clinical and radiographic correlation is advised as these findings may represent entire andria replacement by metastatic carcinoma or direct extension of the tumor.     Right malignancy appears unifocal. The biopsy-proven carcinoma measured 0.6 cm on ultrasound. There is metastatic disease to a right axillary lymph node.  A second abnormal appearing lymph node is noted in the right axilla (not biopsied). Recent imaging of the contralateral left breast dated 6/18/2024 was reviewed and shows no suspicious findings.      7/18/2024 Genomic Testing    [facility]  A total of 59 genes were evaluated, including: APC, RICKY, BARD 1, BMPR1A, BRCA1, BRCA2, BRIP1, CDH1, CDK4, CDKN1B, CKDN2A, CHEK2, DICER1, FH, FLCN, KIF1B, MAX, MEN1, MET, MLH1, MSH2, MSH6, MUTYH, NBN, NF1, NTHL1, PALB2, PMS2, POT1, PTEN, RAD51C, RAD51D, RB1, RET, SDHA, SDHAF2, SDHB, SDHC, SDHD, SMAD4, SMARCA4, STK11, KBDY121, TP53, TSC1, TSC2, VHL (sequencing and depletion/duplication); AXIN2, CTNNA1, EGLN1, HOXB13, KIT, MITF, MSH3, PDGFRA, POLD1, AND POLE (sequencing only); EPCAM, and GREM1 (depletion/duplication only)  Negative result. No pathogenic sequence variants or deletions/duplications identified     9/19/2024 -  Cancer Staged    Staging form: Breast, AJCC 8th Edition  - Pathologic stage from 9/19/2024: Stage IB (pT2, pN2a, cM0, G2, ER+, DC+, HER2-, Oncotype DX score: 20) - Signed by Ros Hyman MD on 9/26/2024  Stage prefix: Initial diagnosis  Multigene prognostic tests performed: Oncotype DX  Recurrence score range: Greater than or equal to 11  Histologic grading system: 3 grade system        9/19/2024 Surgery    Right modified radical mastectomy left simple mastectomy immediate reconstruction with Dr. Archibald  RIGHT  Invasive breast carcinoma of no special type (ductal NST / invasive ductal carcinoma)   34 mm  Separate tumor nodule, 11 mm, most compatible with completely replaced lymph node   Grade 2   Invasive carcinoma less than 1 mm from posterior margin   Subdermal invasion by tumor. Perineural invasion by tumor  5/12 Lymph nodes; positive for extranodal extension  ER 91  AR 61   HER2 1+  Anatomic stage: at least stage IIIA  Prognostic stage: IB    LEFT  Breast tissue with non-invasive lobular neoplasia (ALH / LCIS) and atypical ductal hyperplasia (ADH)      10/31/2024 -  Chemotherapy    alteplase (CATHFLO), 2 mg, Intracatheter, Every 1 Minute as needed, 4 of 4 cycles  pegfilgrastim (NEULASTA ONPRO), 6 mg, Subcutaneous, Once, 4 of 4 cycles  Administration: 6 mg (10/31/2024), 6 mg (11/21/2024), 6 mg (12/12/2024)  cyclophosphamide (CYTOXAN) IVPB, 1,020 mg, Intravenous, Once, 4 of 4 cycles  Administration: 1,000 mg (10/31/2024), 1,000 mg (11/21/2024), 1,000 mg (12/12/2024)  DOCEtaxel (TAXOTERE) chemo infusion, 75 mg/m2 = 127.6 mg, Intravenous, Once, 4 of 4 cycles  Administration: 127.6 mg (10/31/2024), 127.6 mg (11/21/2024), 127.6 mg (12/12/2024)     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)   7/11/2024 Initial Diagnosis    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)     7/18/2024 Genomic Testing    [facility]  A total of 59 genes were evaluated, including: APC, RICKY, BARD 1, BMPR1A, BRCA1, BRCA2, BRIP1, CDH1, CDK4, CDKN1B, CKDN2A, CHEK2, DICER1, FH, FLCN, KIF1B, MAX, MEN1, MET, MLH1, MSH2, MSH6, MUTYH, NBN, NF1, NTHL1, PALB2, PMS2, POT1, PTEN, RAD51C, RAD51D, RB1, RET, SDHA, SDHAF2, SDHB, SDHC, SDHD, SMAD4, SMARCA4, STK11, CONU659, TP53, TSC1, TSC2, VHL (sequencing and depletion/duplication); AXIN2, CTNNA1, EGLN1, HOXB13, KIT, MITF, MSH3, PDGFRA, POLD1, AND  POLE (sequencing only); EPCAM, and GREM1 (depletion/duplication only)  Negative result. No pathogenic sequence variants or deletions/duplications identified     9/19/2024 Surgery    Right modified radical mastectomy left simple mastectomy immediate reconstruction with Dr. Archibald  RIGHT  Invasive breast carcinoma of no special type (ductal NST / invasive ductal carcinoma)   34 mm  Separate tumor nodule, 11 mm, most compatible with completely replaced lymph node   Grade 2   Invasive carcinoma less than 1 mm from posterior margin   Subdermal invasion by tumor. Perineural invasion by tumor  5/12 Lymph nodes; positive for extranodal extension  ER 91  VA 61   HER2 1+  Anatomic stage: at least stage IIIA  Prognostic stage: IB    LEFT  Breast tissue with non-invasive lobular neoplasia (ALH / LCIS) and atypical ductal hyperplasia (ADH)          Review of Systems:  Review of Systems    Clinical Trial: no    Pregnancy test needed:  yes    Teachingn n/a    Health Maintenance   Topic Date Due    Pneumococcal Vaccine: Pediatrics (0 to 5 Years) and At-Risk Patients (6 to 64 Years) (1 of 2 - PCV) Never done    BMI: Followup Plan  Never done    Zoster Vaccine (1 of 2) Never done    Cervical Cancer Screening  10/03/2024    COVID-19 Vaccine (7 - 2024-25 season) 12/07/2024    Annual Physical  03/19/2025    Depression Screening  10/23/2025    BMI: Adult  01/02/2026    Colorectal Cancer Screening  03/24/2027    DTaP,Tdap,and Td Vaccines (2 - Td or Tdap) 10/03/2029    RSV Vaccine Age 60+ Years (1 - 1-dose 75+ series) 03/14/2052    HIV Screening  Completed    Hepatitis C Screening  Completed    Influenza Vaccine  Completed    Meningococcal B Vaccine  Aged Out    RSV Vaccine age 0-20 Months  Aged Out    HIB Vaccine  Aged Out    IPV Vaccine  Aged Out    Hepatitis A Vaccine  Aged Out    Meningococcal ACWY Vaccine  Aged Out    HPV Vaccine  Aged Out     Patient Active Problem List   Diagnosis    Bilateral primary osteoarthritis of knee    BMI  30.0-30.9,adult    Malignant neoplasm of lower-outer quadrant of right breast of female, estrogen receptor positive (HCC)    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)    Status post bilateral breast reconstruction    Status post bilateral mastectomy    Chemotherapy induced neutropenia (HCC)     Past Medical History:   Diagnosis Date    Arthritis     BRCA1 negative     BRCA2 negative     Breast cancer (HCC)     Breast mass     Cancer (HCC)      Past Surgical History:   Procedure Laterality Date    BREAST BIOPSY Right 2024     SECTION      x2    FOOT SURGERY      MAMMO NEEDLE LOCALIZATION LEFT (ALL INC) Left 9/3/2024    MRI BREAST BIOPSY LEFT (ALL INCLUSIVE) Left 2024    MRI BREAST BIOPSY RIGHT (ALL INCLUSIVE) Right 2024    FL MAST MODF RAD W/AX LYMPH NOD W/WO PECT/AMALIA MIN Right 2024    Procedure: RIGHT BREAST MODIFIED RADICAL MASTECTOMY LEVEL I AND II LYMPH NODE DISSECTION;  Surgeon: Mert Brink MD;  Location: [facility];  Service: Surgical Oncology    FL MASTECTOMY SIMPLE COMPLETE Left 2024    Procedure: LEFT MASTECTOMY, SAMY CLIPS IN THE RIGHT BREAST/AXILLARY LYMPH NODE AND LEFT BREAST;  Surgeon: Mert Brink MD;  Location: [facility];  Service: Surgical Oncology    FL TISSUE EXPANDER PLACEMENT BREAST RECONSTRUCTION Bilateral 2024    Procedure: FIRST STAGE BILATERAL BREAST RECONSTRUCTION WITH TISSUE EXPANDERS AND ADM;  Surgeon: Jonathan Archibald MD;  Location: [facility];  Service: Plastics    US GUIDED BREAST BIOPSY RIGHT COMPLETE Right 2024    US GUIDED BREAST LYMPH NODE BIOPSY RIGHT Right 2024     Family History   Problem Relation Age of Onset    Dementia Mother     Lung cancer Mother     Heart disease Father     Skin cancer Father     Heart attack Father         AT THE AGE OF 60    No Known Problems Son     No Known Problems Daughter     No Known Problems Maternal Grandmother     No Known Problems Maternal  Grandfather     No Known Problems Paternal Grandmother     No Known Problems Paternal Grandfather     Breast cancer Neg Hx      Social History     Socioeconomic History    Marital status: /Civil Union     Spouse name: Not on file    Number of children: Not on file    Years of education: Not on file    Highest education level: Not on file   Occupational History    Not on file   Tobacco Use    Smoking status: Never     Passive exposure: Never    Smokeless tobacco: Never   Vaping Use    Vaping status: Never Used   Substance and Sexual Activity    Alcohol use: Not Currently     Comment: Very rarely, 1 or 2 beers if i do.    Drug use: No    Sexual activity: Yes     Partners: Male     Comment: Tubes tied 16 years ago   Other Topics Concern    Not on file   Social History Narrative    Lives with  and kids     Work for [employer]      Social Drivers of Health     Financial Resource Strain: Not on file   Food Insecurity: Not on file   Transportation Needs: Not on file   Physical Activity: Not on file   Stress: Not on file   Social Connections: Not on file   Intimate Partner Violence: Not on file   Housing Stability: Not on file       Current Outpatient Medications:     cyclobenzaprine (FLEXERIL) 10 mg tablet, Take 1 tablet (10 mg total) by mouth 3 (three) times a day as needed for muscle spasms (Patient not taking: Reported on 11/11/2024), Disp: 30 tablet, Rfl: 0    dexamethasone (DECADRON) 4 mg tablet, Take 2 tablets (8 mg total) by mouth 2 (two) times a day with meals START DAY BEFORE CHEMO DAY OF CHEMO AND DAY AFTER CHEMO EVERY 3 WEEKS FOR FOUR CYCLES (Patient not taking: Reported on 11/26/2024), Disp: 12 tablet, Rfl: 3    enoxaparin (Lovenox) 40 mg/0.4 mL, Inject 0.4 mL (40 mg total) under the skin in the morning for 7 days (Patient not taking: Reported on 10/9/2024), Disp: 2.8 mL, Rfl: 0    gabapentin (Neurontin) 300 mg capsule, Take 1 capsule (300 mg total) by mouth 3 (three) times a day for 7 days (Patient  not taking: Reported on 11/20/2024), Disp: 21 capsule, Rfl: 0    loratadine (CLARITIN) 10 mg tablet, Take 10 mg by mouth as needed for allergies (Patient not taking: Reported on 10/14/2024), Disp: , Rfl:     ondansetron (ZOFRAN) 8 mg tablet, Take 1 tablet (8 mg total) by mouth every 8 (eight) hours as needed for nausea or vomiting (Patient not taking: Reported on 10/23/2024), Disp: 20 tablet, Rfl: 3    oxyCODONE (Roxicodone) 5 immediate release tablet, Take 1 tablet (5 mg total) by mouth every 6 (six) hours as needed for severe pain Max Daily Amount: 20 mg (Patient not taking: Reported on 10/14/2024), Disp: 10 tablet, Rfl: 0  No Known Allergies  There were no vitals filed for this visit.

## 2025-01-09 ENCOUNTER — TELEPHONE (OUTPATIENT)
Dept: PLASTIC SURGERY | Facility: CLINIC | Age: 50
End: 2025-01-09

## 2025-01-09 NOTE — TELEPHONE ENCOUNTER
Called patient to schedule 2 more fill appts as I was asked to do.  Patient states she had her radiation simulation and was informed she can have no more fills.  Patient requesting provider to call her to discuss how this affects the plastic surgery aspect. Message was sent to our provider.   Runny nose, sore throat, also concerned for stds, pink discharge gcs 15

## 2025-01-10 ENCOUNTER — APPOINTMENT (OUTPATIENT)
Dept: LAB | Facility: CLINIC | Age: 50
End: 2025-01-10
Payer: COMMERCIAL

## 2025-01-13 ENCOUNTER — OFFICE VISIT (OUTPATIENT)
Dept: SURGICAL ONCOLOGY | Facility: CLINIC | Age: 50
End: 2025-01-13
Payer: COMMERCIAL

## 2025-01-13 VITALS
DIASTOLIC BLOOD PRESSURE: 84 MMHG | SYSTOLIC BLOOD PRESSURE: 114 MMHG | BODY MASS INDEX: 27.83 KG/M2 | OXYGEN SATURATION: 99 % | WEIGHT: 163 LBS | TEMPERATURE: 97.6 F | HEART RATE: 105 BPM | HEIGHT: 64 IN

## 2025-01-13 DIAGNOSIS — Z17.0 MALIGNANT NEOPLASM OF LOWER-OUTER QUADRANT OF RIGHT BREAST OF FEMALE, ESTROGEN RECEPTOR POSITIVE (HCC): Primary | ICD-10-CM

## 2025-01-13 DIAGNOSIS — E04.9 ENLARGED THYROID: Primary | ICD-10-CM

## 2025-01-13 DIAGNOSIS — Z90.13 STATUS POST BILATERAL MASTECTOMY: ICD-10-CM

## 2025-01-13 DIAGNOSIS — C77.3 CARCINOMA OF RIGHT BREAST METASTATIC TO AXILLARY LYMPH NODE (HCC): ICD-10-CM

## 2025-01-13 DIAGNOSIS — C50.911 CARCINOMA OF RIGHT BREAST METASTATIC TO AXILLARY LYMPH NODE (HCC): ICD-10-CM

## 2025-01-13 DIAGNOSIS — C50.511 MALIGNANT NEOPLASM OF LOWER-OUTER QUADRANT OF RIGHT BREAST OF FEMALE, ESTROGEN RECEPTOR POSITIVE (HCC): Primary | ICD-10-CM

## 2025-01-13 PROCEDURE — 99215 OFFICE O/P EST HI 40 MIN: CPT | Performed by: SURGERY

## 2025-01-13 NOTE — PROGRESS NOTES
Surgical Oncology Follow Up  [facility]  [facility] [place]  [address] [facility] [address]    Stacey Singhkman  1975  [id number]      Chief Complaint   Patient presents with   • Follow-up     3 Month Follow Up        Assessment & Plan:   49-year-old female with a right breast cancer s/p right MRM and left mastectomy with tissue expander reconstruction  Followed by adjuvant chemotherapy.  She is already seen by radiation oncologist plan for adjuvant radiation.  At today's visit flaps are clean intact expanders are in place.  No palpable evidence of local regional recurrence.  Right axillary and supraclavicular examination no palpable adenopathy.  I will see her in 6 months.    Oncology History   Malignant neoplasm of lower-outer quadrant of right breast of female, estrogen receptor positive (HCC)   7/11/2024 Biopsy    Right breast ultrasound guided biopsy  A. 7 o'clock 7 cm from nipple  Invasive breast carcinoma of no special type (ductal NST/invasive ductal carcinoma)   Grade 2  ER 95, IN 61, HER2 1+  No lymphovascular invasion    B. Right axillary lymph node  Invasive ductal carcinoma of mammary origin, involving fibroadipose tissue, see comment.   Lymphoid tissue is not identified.    C. Right axillary lymph node  Invasive ductal carcinoma of mammary origin, involving fibroadipose tissue, see comment.   Lymphoid tissue is not identified.    Right malignancy appears unifocal. The biopsy-proven carcinoma measured 0.6 cm on ultrasound. There is metastatic disease to a right axillary lymph node.  A second abnormal appearing lymph node is noted in the right axilla (not biopsied). 6/18/2024 left breast mammo negative.     7/18/2024 Genetic Testing    NEGATIVE: No Clinically Significant Variants Detected  [place] - A total of 59 genes were evaluated with RNA insight: APC, RICKY, BAP1, BARD1, BMPR1A, BRCA1, BRCA2, BRIP1, CDH1, CDK4, CDKN1B, CDKN2A, CHEK2, DICER1,  FH, FLCN, KIF1B, MAX, MEN1, MET, MLH1, MSH2, MSH6, MUTYH, NF1, NTHL1, PALB2, PMS2, POT1, PTEN, RAD51C, RAD51D, RB1, RET, SDHA, SDHAF2, SDHB, SDHC, SDHD, SMAD4, SMARCA4, STK11, PPOT226, TP53, TSC1, TSC2 and VHL (sequencing and deletion/duplication); AXIN2, CTNNA1, EGLN1, HOXB13, KIT, MITF, MSH3, PDGFRA, POLD1 and POLE (sequencing only); EPCAM and GREM1 (deletion/duplication only).     7/19/2024 Observation    Bilateral Breast MRI IMPRESSION:  Continued surgical and oncologic management is recommended for the biopsy proven malignancy of the right breast corresponding to a 3.4 cm mass at 7:00 in the right breast and the biopsy proven metastatic right axillary lymph node. There are approximately 4 enlarged right level 1 lymph nodes. No internal mammary adenopathy.      Indeterminate mass at 12:00 and non-mass enhancement at 9:00 in the right breast. If patient is pursuing breast conservation therapy, two site MRI guided biopsy of the right breast is recommended.      Indeterminate non-mass enhancement at 7:00 in the left breast. MRI guided biopsy is recommended.      Of note, bilateral biopsies cannot be preformed on the same day as the left side will need a medial approach.      8/9/2024 Biopsy    Left breast MRI-guided biopsy  7 o'clock  Lobular neoplasia (ALH and LCIS)    Concordant. Area of non-mass enhancement measured 8 mm on MRI.     8/16/2024 Biopsy    Right breast MRI-guided biopsy  A. 12 o'clock  Unremarkable ductules and lobules in a background of dense stromal fibrosis  Focal changes suggestive for prior biopsy  Negative for atypia or carcinoma    B. 10 o'clock  Focus of columnar cell change and columnar cell hyperplasia with calcifications  Negative for atypia or carcinoma    Concordant. Continued surgical management for known ipsilateral breast carcinoma recommended.     9/19/2024 Surgery    Right modified radical mastectomy left simple mastectomy immediate reconstruction with Dr. Dragan QUIROZ  Invasive  breast carcinoma of no special type (ductal NST / invasive ductal carcinoma)   34 mm  Separate tumor nodule, 11 mm, most compatible with completely replaced lymph node   Grade 2   Invasive carcinoma less than 1 mm from posterior margin   Subdermal invasion by tumor. Perineural invasion by tumor  5/12 Lymph nodes; positive for extranodal extension  Anatomic stage: at least stage IIIA  Prognostic stage: IB    LEFT  Breast tissue with non-invasive lobular neoplasia (ALH / LCIS) and atypical ductal hyperplasia (ADH)      9/19/2024 -  Cancer Staged    Staging form: Breast, AJCC 8th Edition  - Pathologic stage from 9/19/2024: Stage IB (pT2, pN2a, cM0, G2, ER+, NJ+, HER2-, Oncotype DX score: 20) - Signed by Ros Hyman MD on 9/26/2024  Stage prefix: Initial diagnosis  Multigene prognostic tests performed: Oncotype DX  Recurrence score range: Greater than or equal to 11  Histologic grading system: 3 grade system       10/31/2024 -  Chemotherapy    alteplase (CATHFLO), 2 mg, Intracatheter, Every 1 Minute as needed, 4 of 4 cycles  pegfilgrastim (NEULASTA ONPRO), 6 mg, Subcutaneous, Once, 4 of 4 cycles  Administration: 6 mg (10/31/2024), 6 mg (11/21/2024), 6 mg (12/12/2024)  cyclophosphamide (CYTOXAN) IVPB, 1,020 mg, Intravenous, Once, 4 of 4 cycles  Administration: 1,000 mg (10/31/2024), 1,000 mg (11/21/2024), 1,000 mg (12/12/2024)  DOCEtaxel (TAXOTERE) chemo infusion, 75 mg/m2 = 127.6 mg, Intravenous, Once, 4 of 4 cycles  Administration: 127.6 mg (10/31/2024), 127.6 mg (11/21/2024), 127.6 mg (12/12/2024)     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 Archibald  RIGHT  Invasive breast carcinoma of no special type (ductal NST / invasive ductal carcinoma)   34 mm  Separate tumor nodule, 11 mm, most compatible with completely replaced lymph node   Grade 2   Invasive carcinoma less than 1 mm from  posterior margin   Subdermal invasion by tumor. Perineural invasion by tumor  5/12 Lymph nodes; positive for extranodal extension  Anatomic stage: at least stage IIIA  Prognostic stage: IB    LEFT  Breast tissue with non-invasive lobular neoplasia (ALH / LCIS) and atypical ductal hyperplasia (ADH)            Interval History:   Follow-up with right breast cancer    Review of Systems:   Review of Systems   Constitutional:  Negative for chills and fever.   HENT:  Negative for ear pain and sore throat.    Eyes:  Negative for pain and visual disturbance.   Respiratory:  Negative for cough and shortness of breath.    Cardiovascular:  Negative for chest pain and palpitations.   Gastrointestinal:  Negative for abdominal pain and vomiting.   Genitourinary:  Negative for dysuria and hematuria.   Musculoskeletal:  Negative for arthralgias and back pain.   Skin:  Negative for color change and rash.   Neurological:  Negative for seizures and syncope.   All other systems reviewed and are negative.    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)   • Status post bilateral breast reconstruction   • Status post bilateral mastectomy   • Chemotherapy induced neutropenia (HCC)     Past Medical History:   Diagnosis Date   • Arthritis    • BRCA1 negative    • BRCA2 negative    • Breast cancer (HCC)      Past Surgical History:   Procedure Laterality Date   • BREAST BIOPSY Right 2024   •  SECTION      x2   • FOOT SURGERY     • MAMMO NEEDLE LOCALIZATION LEFT (ALL INC) Left 9/3/2024   • MRI BREAST BIOPSY LEFT (ALL INCLUSIVE) Left 2024   • MRI BREAST BIOPSY RIGHT (ALL INCLUSIVE) Right 2024   • [place] MAST MODF RAD W/AX LYMPH NOD W/WO PECT/AMALIA MIN Right 2024     Procedure: RIGHT BREAST MODIFIED RADICAL MASTECTOMY LEVEL I AND II LYMPH NODE DISSECTION;  Surgeon: Mert Brink MD;  Location: [facility];  Service: Surgical Oncology   • IA MASTECTOMY SIMPLE COMPLETE Left 9/19/2024    Procedure: LEFT MASTECTOMY, SAMY CLIPS IN THE RIGHT BREAST/AXILLARY LYMPH NODE AND LEFT BREAST;  Surgeon: Mert Brink MD;  Location: [facility];  Service: Surgical Oncology   • IA TISSUE EXPANDER PLACEMENT BREAST RECONSTRUCTION Bilateral 9/19/2024    Procedure: FIRST STAGE BILATERAL BREAST RECONSTRUCTION WITH TISSUE EXPANDERS AND ADM;  Surgeon: Jonathan Archibald MD;  Location: [facility];  Service: Plastics   • US GUIDED BREAST BIOPSY RIGHT COMPLETE Right 7/11/2024   • US GUIDED BREAST LYMPH NODE BIOPSY RIGHT Right 7/11/2024     Family History   Problem Relation Age of Onset   • Dementia Mother    • Lung cancer Mother    • Heart disease Father    • Skin cancer Father    • Heart attack Father         AT THE AGE OF 60   • No Known Problems Maternal Grandmother    • No Known Problems Maternal Grandfather    • No Known Problems Paternal Grandmother    • No Known Problems Paternal Grandfather    • No Known Problems Daughter    • No Known Problems Son      Social History     Socioeconomic History   • Marital status: /Civil Union     Spouse name: Not on file   • Number of children: Not on file   • Years of education: Not on file   • Highest education level: Not on file   Occupational History   • Not on file   Tobacco Use   • Smoking status: Never     Passive exposure: Never   • Smokeless tobacco: Never   Vaping Use   • Vaping status: Never Used   Substance and Sexual Activity   • Alcohol use: Not Currently     Comment: Very rarely, 1 or 2 beers if i do.   • Drug use: No   • Sexual activity: Yes     Partners: Male     Comment: Tubes tied 16 years ago   Other Topics Concern   • Not on file   Social History Narrative    Lives with  and kids     Work for [employer]       Social Drivers of Health     Financial Resource Strain: Not on file   Food Insecurity: Not on file   Transportation Needs: Not on file   Physical Activity: Not on file   Stress: Not on file   Social Connections: Not on file   Intimate Partner Violence: Not on file   Housing Stability: Not on file       Current Outpatient Medications:   •  cyclobenzaprine (FLEXERIL) 10 mg tablet, Take 1 tablet (10 mg total) by mouth 3 (three) times a day as needed for muscle spasms, Disp: 30 tablet, Rfl: 0  •  dexamethasone (DECADRON) 4 mg tablet, Take 2 tablets (8 mg total) by mouth 2 (two) times a day with meals START DAY BEFORE CHEMO DAY OF CHEMO AND DAY AFTER CHEMO EVERY 3 WEEKS FOR FOUR CYCLES, Disp: 12 tablet, Rfl: 3  •  enoxaparin (Lovenox) 40 mg/0.4 mL, Inject 0.4 mL (40 mg total) under the skin in the morning for 7 days, Disp: 2.8 mL, Rfl: 0  •  gabapentin (Neurontin) 300 mg capsule, Take 1 capsule (300 mg total) by mouth 3 (three) times a day for 7 days, Disp: 21 capsule, Rfl: 0  •  loratadine (CLARITIN) 10 mg tablet, Take 10 mg by mouth as needed for allergies, Disp: , Rfl:   •  ondansetron (ZOFRAN) 8 mg tablet, Take 1 tablet (8 mg total) by mouth every 8 (eight) hours as needed for nausea or vomiting, Disp: 20 tablet, Rfl: 3  •  oxyCODONE (Roxicodone) 5 immediate release tablet, Take 1 tablet (5 mg total) by mouth every 6 (six) hours as needed for severe pain Max Daily Amount: 20 mg, Disp: 10 tablet, Rfl: 0  No Known Allergies    Physical Exam:     Vitals:    01/13/25 0935   BP: 114/84   Pulse: 105   Temp: 97.6 °F (36.4 °C)   SpO2: 99%     Physical Exam  Constitutional:       Appearance: Normal appearance.   HENT:      Head: Normocephalic and atraumatic.      Nose: Nose normal.      Mouth/Throat:      Mouth: Mucous membranes are moist.   Eyes:      Pupils: Pupils are equal, round, and reactive to light.   Cardiovascular:      Rate and Rhythm: Normal rate.      Pulses: Normal pulses.      Heart sounds: Normal heart  sounds.   Pulmonary:      Effort: Pulmonary effort is normal.      Breath sounds: Normal breath sounds.   Chest:          Comments: Tissue expanders are in place.  No palpable mass or masses in the flaps.  No suspicious bilateral or supraclavicular adenopathy.  Abdominal:      General: Bowel sounds are normal.      Palpations: Abdomen is soft.   Musculoskeletal:         General: Normal range of motion.      Cervical back: Normal range of motion and neck supple.   Skin:     General: Skin is warm.   Neurological:      General: No focal deficit present.      Mental Status: She is alert and oriented to person, place, and time.   Psychiatric:         Mood and Affect: Mood normal.         Behavior: Behavior normal.         Thought Content: Thought content normal.         Judgment: Judgment normal.       Results & Discussion:   I did review radiation oncology note and agree with adjuvant radiation given the significant andria involvement.  Patient aware that risk of lymphedema.  We did discuss the follow-up postmastectomy.  I did discussed in detail nature of breast cancer surveillance.  she understands and  agrees . All patient questions were answered.       Advance Care Planning/Advance Directives:  I Mert Brink MD discussed the disease status with Stacey Villatoro  today 01/13/25  treatment plans and follow-up with the patient.

## 2025-01-16 ENCOUNTER — TELEPHONE (OUTPATIENT)
Age: 50
End: 2025-01-16

## 2025-01-16 ENCOUNTER — APPOINTMENT (OUTPATIENT)
Dept: LAB | Facility: CLINIC | Age: 50
End: 2025-01-16
Payer: COMMERCIAL

## 2025-01-16 DIAGNOSIS — E04.9 ENLARGED THYROID: ICD-10-CM

## 2025-01-16 LAB — TSH SERPL DL<=0.05 MIU/L-ACNC: 2.81 UIU/ML (ref 0.45–4.5)

## 2025-01-16 PROCEDURE — 77338 DESIGN MLC DEVICE FOR IMRT: CPT | Performed by: RADIOLOGY

## 2025-01-16 PROCEDURE — 77301 RADIOTHERAPY DOSE PLAN IMRT: CPT | Performed by: RADIOLOGY

## 2025-01-16 PROCEDURE — 77300 RADIATION THERAPY DOSE PLAN: CPT | Performed by: RADIOLOGY

## 2025-01-16 PROCEDURE — 36415 COLL VENOUS BLD VENIPUNCTURE: CPT

## 2025-01-16 PROCEDURE — 84443 ASSAY THYROID STIM HORMONE: CPT

## 2025-01-16 NOTE — TELEPHONE ENCOUNTER
Call received by patient. Per patient she had SIM placement done on 1/8/25. Patient stated she was instructed to call the office back in one week to schedule her radiation treatments. Patient is requesting to have RT done at [facility].     Advised patient I will send message to  and they will reach out. Also advised patient it normally takes 10-14 days to create a RT plan for her per guide.     Please call patient.       Thanks!

## 2025-01-16 NOTE — TELEPHONE ENCOUNTER
RAD ONC- Return call to patient.  Reviewed status of Radiation Oncology treatment plan.  Confirmed [facility] for daily treatment.  Patient verbalized understanding and was very appreciative of call..KD

## 2025-01-17 ENCOUNTER — RESULTS FOLLOW-UP (OUTPATIENT)
Dept: FAMILY MEDICINE CLINIC | Facility: CLINIC | Age: 50
End: 2025-01-17

## 2025-01-21 DIAGNOSIS — C77.3 CARCINOMA OF RIGHT BREAST METASTATIC TO AXILLARY LYMPH NODE (HCC): Primary | ICD-10-CM

## 2025-01-21 DIAGNOSIS — C50.911 CARCINOMA OF RIGHT BREAST METASTATIC TO AXILLARY LYMPH NODE (HCC): Primary | ICD-10-CM

## 2025-01-22 ENCOUNTER — TELEPHONE (OUTPATIENT)
Age: 50
End: 2025-01-22

## 2025-01-22 ENCOUNTER — OFFICE VISIT (OUTPATIENT)
Dept: HEMATOLOGY ONCOLOGY | Facility: CLINIC | Age: 50
End: 2025-01-22
Payer: COMMERCIAL

## 2025-01-22 ENCOUNTER — TELEPHONE (OUTPATIENT)
Dept: HEMATOLOGY ONCOLOGY | Facility: CLINIC | Age: 50
End: 2025-01-22

## 2025-01-22 ENCOUNTER — APPOINTMENT (OUTPATIENT)
Dept: RADIATION ONCOLOGY | Facility: CLINIC | Age: 50
End: 2025-01-22
Attending: RADIOLOGY
Payer: COMMERCIAL

## 2025-01-22 VITALS
SYSTOLIC BLOOD PRESSURE: 112 MMHG | HEIGHT: 64 IN | DIASTOLIC BLOOD PRESSURE: 76 MMHG | HEART RATE: 82 BPM | WEIGHT: 164 LBS | TEMPERATURE: 98.2 F | BODY MASS INDEX: 28 KG/M2 | RESPIRATION RATE: 18 BRPM | OXYGEN SATURATION: 98 %

## 2025-01-22 DIAGNOSIS — C50.511 MALIGNANT NEOPLASM OF LOWER-OUTER QUADRANT OF RIGHT BREAST OF FEMALE, ESTROGEN RECEPTOR POSITIVE (HCC): Primary | ICD-10-CM

## 2025-01-22 DIAGNOSIS — Z17.0 MALIGNANT NEOPLASM OF LOWER-OUTER QUADRANT OF RIGHT BREAST OF FEMALE, ESTROGEN RECEPTOR POSITIVE (HCC): Primary | ICD-10-CM

## 2025-01-22 DIAGNOSIS — C50.912 INVASIVE DUCTAL CARCINOMA OF BREAST, FEMALE, LEFT (HCC): ICD-10-CM

## 2025-01-22 LAB
RAD ONC ARIA COURSE FIRST TREATMENT DATE: NORMAL
RAD ONC ARIA COURSE ID: NORMAL
RAD ONC ARIA COURSE INTENT: NORMAL
RAD ONC ARIA COURSE LAST TREATMENT DATE: NORMAL
RAD ONC ARIA COURSE SESSION NUMBER: 1
RAD ONC ARIA COURSE START DATE: NORMAL
RAD ONC ARIA COURSE TREATMENT ELAPSED DAYS: 0
RAD ONC ARIA PLAN FRACTIONS TREATED TO DATE: 1
RAD ONC ARIA PLAN ID: NORMAL
RAD ONC ARIA PLAN PRESCRIBED DOSE PER FRACTION: 2 GY
RAD ONC ARIA PLAN PRIMARY REFERENCE POINT: NORMAL
RAD ONC ARIA PLAN TOTAL FRACTIONS PRESCRIBED: 25
RAD ONC ARIA PLAN TOTAL PRESCRIBED DOSE: 5000 CGY
RAD ONC ARIA REFERENCE POINT DOSAGE GIVEN TO DATE: 2 GY
RAD ONC ARIA REFERENCE POINT ID: NORMAL
RAD ONC ARIA REFERENCE POINT SESSION DOSAGE GIVEN: 2 GY

## 2025-01-22 PROCEDURE — 77014 CHG CT GUIDANCE RADIATION THERAPY FLDS PLACEMENT: CPT | Performed by: RADIOLOGY

## 2025-01-22 PROCEDURE — 77427 RADIATION TX MANAGEMENT X5: CPT | Performed by: RADIOLOGY

## 2025-01-22 PROCEDURE — 99215 OFFICE O/P EST HI 40 MIN: CPT | Performed by: INTERNAL MEDICINE

## 2025-01-22 PROCEDURE — 77385 HB NTSTY MODUL RAD TX DLVR SMPL: CPT | Performed by: RADIOLOGY

## 2025-01-22 RX ORDER — ANASTROZOLE 1 MG/1
1 TABLET ORAL DAILY
Qty: 30 TABLET | Refills: 11 | Status: SHIPPED | OUTPATIENT
Start: 2025-01-22

## 2025-01-22 NOTE — PROGRESS NOTES
Name: Stacey Villatoro      : 1975      MRN: [id number]  Encounter Provider: Ros Hyman MD  Encounter Date: 2025   Encounter department: [facility] [place]  :  Assessment & Plan  Malignant neoplasm of lower-outer quadrant of right breast of female, estrogen receptor positive (HCC)  Anatomical stage IIIa and pathological stage Ib (pT2, pN2a, cM0, G2), ER positive, HI positive, HER2/divina 1+ negative, 4 /10 involved with metastatic disease with extranodal extension. Oncotype DX recurrence score of 20.     Status post radical bilateral mastectomy on 2024.  In the left breast mastectomy was found to have noninvasive lobular neoplasia.  She completed 4 cycles of adjuvant chemotherapy with docetaxel and cyclophosphamide on 2025.    The patient is about to get started on adjuvant radiation to the right mastectomy site and right axilla which will last for about 5-1/2 weeks.    We did discuss the need for adjuvant endocrine therapy with one of the aromatase inhibitors along with the LHRH agonist for ovarian suppression.  The patient was educated extensively about anastrozole and the potential side effects/toxicity.  She will be also started on Lupron injections on a monthly basis to suppress her ovarian function.  We then discussed the potential benefit of adjuvant CDK 4/6 inhibitor.  She would be a candidate for abemaciclib since fits the high risk profile with 4 lymph nodes involved with malignancy.  The patient was also educated about abemaciclib side effects and toxicity including diarrhea which may require dose adjustment.  She will be started on 150 mg twice a day for a total of 2 years.    We did also discuss the need for a bone density scan and vitamin D and calcium supplements on a daily basis.      Orders:    CBC and differential; Future    Comprehensive metabolic panel; Future    Magnesium; Future    Vitamin D 25 hydroxy; Future    Invasive ductal carcinoma  of breast, female, left (HCC)  She is status post left total mastectomy as well.  Pathology showed noninvasive lobular neoplasia and atypical ductal hyperplasia.  Pathology was negative for invasive carcinoma.  Orders:    CBC and differential; Future    Comprehensive metabolic panel; Future    Magnesium; Future    DXA body comp analysis; Future    Vitamin D 25 hydroxy; Future        History of Present Illness   Chief Complaint   Patient presents with    Follow-up   HPI:  This is a 49-year-old female without significant past medical history.  Family history is negative for breast cancer.  The patient apparently had her first screening mammogram on 6/18/2024 which was read as abnormal with right breast mass at 7 o'clock position 7 cm from the nipple with suspicious lymph adenopathy in the right axilla.  She then had multiple imaging including diagnostic mammogram, ultrasound of the right breast and ultrasound-guided biopsy of the right breast mass and right axillary lymph node on 7/11/2024.  The pathology was compatible with invasive breast cancer, ER strongly +95%, AR +70%, HER2/divina negative 1+.  CT scan of the chest abdomen pelvis on 7/18/2024 was negative for any obvious signs of metastatic disease.  MRI of the breast bilaterally on 7/19/2024 showed  MPRESSION:   Continued surgical and oncologic management is recommended for the biopsy proven malignancy of the right breast corresponding to a 3.4 cm mass at 7:00 in the right breast and the biopsy proven metastatic right axillary lymph node. There are approximately 4 enlarged right level 1 lymph nodes. No internal mammary adenopathy.      Indeterminate mass at 12:00 and non-mass enhancement at 9:00 in the right breast. If patient is pursuing breast conservation therapy, two site MRI guided biopsy of the right breast is recommended.      Indeterminate non-mass enhancement at 7:00 in the left breast. MRI guided biopsy is recommended.      Biopsy from the left breast at  7 o'clock position showed lobular neoplasia( ALH & LCIS, 4 foci with 5mm largest focus) .  She also had an MRI guided biopsy of the right breast on T 12:00 and 10 o'clock position which came back negative for malignant process.     [employer] molecular screening on 6/4/2024 was negative for obvious genetic alteration.     The patient then underwent bilateral total mastectomy on 9/19/2024 which showed:  Final Diagnosis   A.  Right breast (modified radical mastectomy):     - Invasive breast carcinoma of no special type (ductal NST / invasive ductal carcinoma).      - Tumor size: 34 mm.  Tumor stgstrstastdstest:st st1st of 3.      - Invasive carcinoma less than 1 mm from posterior margin.      - Separate tumor nodule, 11 mm, most compatible with completely replaced lymph node.     - One (1) additional lymph node, negative for carcinoma.      B.  Levels 1-2, right axilla (dissection):     - Metastatic mammary carcinoma involving four (4) of ten (10) lymph nodes.      - Tumor deposits measure from 7-20 mm; positive for extranodal extension.     C.  Left breast (mastectomy):     - Breast tissue with non-invasive lobular neoplasia (ALH / LCIS) and atypical ductal hyperplasia (ADH).     - Negative for invasive carcinoma.     D.  New superior margin, right breast (excision):     - Benign skin and subcutaneous tissue.         Oncotype Dx recurrence score came back 20.        Interval history:   The patient came today for a follow-up visit.  She completed the adjuvant chemotherapy on 1/2/2025 which she tolerated relatively well.  She is in the process of getting adjuvant radiation treatment to the right mastectomy and axilla site.  Blood work on 12/30/2024 showed white cell count of 3.6 with normal hemoglobin and platelet.  Creatinine 0.5 with normal calcium and liver enzymes.    Oncology History   Cancer Staging   Malignant neoplasm of lower-outer quadrant of right breast of female, estrogen receptor positive (HCC)  Staging form: Breast, AJCC 8th  Edition  - Pathologic stage from 9/19/2024: Stage IB (pT2, pN2a, cM0, G2, ER+, MD+, HER2-, Oncotype DX score: 20) - Signed by Ros Hyman MD on 9/26/2024  Stage prefix: Initial diagnosis  Multigene prognostic tests performed: Oncotype DX  Recurrence score range: Greater than or equal to 11  Histologic grading system: 3 grade system  Oncology History   Malignant neoplasm of lower-outer quadrant of right breast of female, estrogen receptor positive (HCC)   7/11/2024 Biopsy    Right breast ultrasound guided biopsy  A. 7 o'clock 7 cm from nipple  Invasive breast carcinoma of no special type (ductal NST/invasive ductal carcinoma)   Grade 2  ER 95, MD 61, HER2 1+  No lymphovascular invasion    B. Right axillary lymph node  Invasive ductal carcinoma of mammary origin, involving fibroadipose tissue, see comment.   Lymphoid tissue is not identified.    C. Right axillary lymph node  Invasive ductal carcinoma of mammary origin, involving fibroadipose tissue, see comment.   Lymphoid tissue is not identified.    Right malignancy appears unifocal. The biopsy-proven carcinoma measured 0.6 cm on ultrasound. There is metastatic disease to a right axillary lymph node.  A second abnormal appearing lymph node is noted in the right axilla (not biopsied). 6/18/2024 left breast mammo negative.     7/18/2024 Genetic Testing    NEGATIVE: No Clinically Significant Variants Detected  [facility] - A total of 59 genes were evaluated with RNA insight: APC, RICKY, BAP1, BARD1, BMPR1A, BRCA1, BRCA2, BRIP1, CDH1, CDK4, CDKN1B, CDKN2A, CHEK2, DICER1, FH, FLCN, KIF1B, MAX, MEN1, MET, MLH1, MSH2, MSH6, MUTYH, NF1, NTHL1, PALB2, PMS2, POT1, PTEN, RAD51C, RAD51D, RB1, RET, SDHA, SDHAF2, SDHB, SDHC, SDHD, SMAD4, SMARCA4, STK11, LJKW797, TP53, TSC1, TSC2 and VHL (sequencing and deletion/duplication); AXIN2, CTNNA1, EGLN1, HOXB13, KIT, MITF, MSH3, PDGFRA, POLD1 and POLE (sequencing only); EPCAM and GREM1 (deletion/duplication only).     7/19/2024 Observation     Bilateral Breast MRI IMPRESSION:  Continued surgical and oncologic management is recommended for the biopsy proven malignancy of the right breast corresponding to a 3.4 cm mass at 7:00 in the right breast and the biopsy proven metastatic right axillary lymph node. There are approximately 4 enlarged right level 1 lymph nodes. No internal mammary adenopathy.      Indeterminate mass at 12:00 and non-mass enhancement at 9:00 in the right breast. If patient is pursuing breast conservation therapy, two site MRI guided biopsy of the right breast is recommended.      Indeterminate non-mass enhancement at 7:00 in the left breast. MRI guided biopsy is recommended.      Of note, bilateral biopsies cannot be preformed on the same day as the left side will need a medial approach.      8/9/2024 Biopsy    Left breast MRI-guided biopsy  7 o'clock  Lobular neoplasia (ALH and LCIS)    Concordant. Area of non-mass enhancement measured 8 mm on MRI.     8/16/2024 Biopsy    Right breast MRI-guided biopsy  A. 12 o'clock  Unremarkable ductules and lobules in a background of dense stromal fibrosis  Focal changes suggestive for prior biopsy  Negative for atypia or carcinoma    B. 10 o'clock  Focus of columnar cell change and columnar cell hyperplasia with calcifications  Negative for atypia or carcinoma    Concordant. Continued surgical management for known ipsilateral breast carcinoma recommended.     9/19/2024 Surgery    Right modified radical mastectomy left simple mastectomy immediate reconstruction with Dr. Archibald  RIGHT  Invasive breast carcinoma of no special type (ductal NST / invasive ductal carcinoma)   34 mm  Separate tumor nodule, 11 mm, most compatible with completely replaced lymph node   Grade 2   Invasive carcinoma less than 1 mm from posterior margin   Subdermal invasion by tumor. Perineural invasion by tumor  5/12 Lymph nodes; positive for extranodal extension  Anatomic stage: at least stage IIIA  Prognostic stage:  IB    LEFT  Breast tissue with non-invasive lobular neoplasia (ALH / LCIS) and atypical ductal hyperplasia (ADH)      9/19/2024 -  Cancer Staged    Staging form: Breast, AJCC 8th Edition  - Pathologic stage from 9/19/2024: Stage IB (pT2, pN2a, cM0, G2, ER+, NM+, HER2-, Oncotype DX score: 20) - Signed by Ros Hyman MD on 9/26/2024  Stage prefix: Initial diagnosis  Multigene prognostic tests performed: Oncotype DX  Recurrence score range: Greater than or equal to 11  Histologic grading system: 3 grade system       10/31/2024 -  Chemotherapy    alteplase (CATHFLO), 2 mg, Intracatheter, Every 1 Minute as needed, 4 of 4 cycles  pegfilgrastim (NEULASTA ONPRO), 6 mg, Subcutaneous, Once, 4 of 4 cycles  Administration: 6 mg (10/31/2024), 6 mg (11/21/2024), 6 mg (12/12/2024)  cyclophosphamide (CYTOXAN) IVPB, 1,020 mg, Intravenous, Once, 4 of 4 cycles  Administration: 1,000 mg (10/31/2024), 1,000 mg (11/21/2024), 1,000 mg (12/12/2024)  DOCEtaxel (TAXOTERE) chemo infusion, 75 mg/m2 = 127.6 mg, Intravenous, Once, 4 of 4 cycles  Administration: 127.6 mg (10/31/2024), 127.6 mg (11/21/2024), 127.6 mg (12/12/2024)     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 Archibald  RIGHT  Invasive breast carcinoma of no special type (ductal NST / invasive ductal carcinoma)   34 mm  Separate tumor nodule, 11 mm, most compatible with completely replaced lymph node   Grade 2   Invasive carcinoma less than 1 mm from posterior margin   Subdermal invasion by tumor. Perineural invasion by tumor  5/12 Lymph nodes; positive for extranodal extension  Anatomic stage: at least stage IIIA  Prognostic stage: IB    LEFT  Breast tissue with non-invasive lobular neoplasia (ALH / LCIS) and atypical ductal hyperplasia (ADH)           Review of Systems   Constitutional:  Positive for fatigue. Negative for chills and fever.    HENT:  Negative for ear pain and sore throat.    Eyes:  Negative for pain and visual disturbance.   Respiratory:  Negative for cough and shortness of breath.    Cardiovascular:  Negative for chest pain and palpitations.   Gastrointestinal:  Positive for constipation. Negative for abdominal pain and vomiting.   Genitourinary:  Negative for dysuria and hematuria.   Musculoskeletal:  Negative for arthralgias and back pain.   Skin:  Negative for color change and rash.   Neurological:  Negative for seizures and syncope.   All other systems reviewed and are negative.    Medical History Reviewed by provider this encounter:  Tobacco  Allergies  Meds  Problems  Med Hx  Surg Hx  Fam Hx     .  Current Outpatient Medications on File Prior to Visit   Medication Sig Dispense Refill    cyclobenzaprine (FLEXERIL) 10 mg tablet Take 1 tablet (10 mg total) by mouth 3 (three) times a day as needed for muscle spasms 30 tablet 0    dexamethasone (DECADRON) 4 mg tablet Take 2 tablets (8 mg total) by mouth 2 (two) times a day with meals START DAY BEFORE CHEMO DAY OF CHEMO AND DAY AFTER CHEMO EVERY 3 WEEKS FOR FOUR CYCLES 12 tablet 3    loratadine (CLARITIN) 10 mg tablet Take 10 mg by mouth as needed for allergies      ondansetron (ZOFRAN) 8 mg tablet Take 1 tablet (8 mg total) by mouth every 8 (eight) hours as needed for nausea or vomiting 20 tablet 3    oxyCODONE (Roxicodone) 5 immediate release tablet Take 1 tablet (5 mg total) by mouth every 6 (six) hours as needed for severe pain Max Daily Amount: 20 mg 10 tablet 0    enoxaparin (Lovenox) 40 mg/0.4 mL Inject 0.4 mL (40 mg total) under the skin in the morning for 7 days 2.8 mL 0    gabapentin (Neurontin) 300 mg capsule Take 1 capsule (300 mg total) by mouth 3 (three) times a day for 7 days 21 capsule 0     No current facility-administered medications on file prior to visit.      Social History     Tobacco Use    Smoking status: Never     Passive exposure: Never    Smokeless  "tobacco: Never   Vaping Use    Vaping status: Never Used   Substance and Sexual Activity    Alcohol use: Not Currently     Comment: Very rarely, 1 or 2 beers if i do.    Drug use: No    Sexual activity: Yes     Partners: Male     Comment: Tubes tied 16 years ago         Objective   /76 (BP Location: Left arm, Patient Position: Sitting, Cuff Size: Adult)   Pulse 82   Temp 98.2 °F (36.8 °C)   Resp 18   Ht 5' 4\" (1.626 m)   Wt 74.4 kg (164 lb)   SpO2 98%   BMI 28.15 kg/m²     Pain Screening:  Pain Score: 0-No pain  ECOG   1  Physical Exam  Constitutional:       General: She is not in acute distress.     Appearance: She is well-developed. She is not diaphoretic.   HENT:      Head: Normocephalic and atraumatic.      Nose: Nose normal.   Eyes:      General: No scleral icterus.        Right eye: No discharge.         Left eye: No discharge.      Conjunctiva/sclera: Conjunctivae normal.      Pupils: Pupils are equal, round, and reactive to light.   Neck:      Thyroid: No thyromegaly.      Vascular: No JVD.      Trachea: No tracheal deviation.   Cardiovascular:      Rate and Rhythm: Normal rate and regular rhythm.      Heart sounds: Normal heart sounds. No murmur heard.     No friction rub.   Pulmonary:      Effort: Pulmonary effort is normal. No respiratory distress.      Breath sounds: Normal breath sounds. No stridor. No wheezing or rales.   Chest:      Chest wall: No tenderness.   Abdominal:      General: There is no distension.      Palpations: Abdomen is soft. There is no hepatomegaly or splenomegaly.      Tenderness: There is no abdominal tenderness. There is no guarding or rebound.   Musculoskeletal:         General: No tenderness or deformity. Normal range of motion.      Cervical back: Normal range of motion and neck supple.   Lymphadenopathy:      Cervical: No cervical adenopathy.   Skin:     General: Skin is warm and dry.      Coloration: Skin is not pale.      Findings: No erythema or rash. "   Neurological:      Mental Status: She is alert and oriented to person, place, and time.      Cranial Nerves: No cranial nerve deficit.      Coordination: Coordination normal.      Deep Tendon Reflexes: Reflexes are normal and symmetric.   Psychiatric:         Behavior: Behavior normal.         Thought Content: Thought content normal.         Judgment: Judgment normal.         Labs: I have reviewed the following labs:  Lab Results   Component Value Date/Time    WBC 3.68 (L) 12/30/2024 08:03 AM    RBC 3.99 12/30/2024 08:03 AM    Hemoglobin 12.0 12/30/2024 08:03 AM    Hematocrit 37.7 12/30/2024 08:03 AM    MCV 95 12/30/2024 08:03 AM    MCH 30.1 12/30/2024 08:03 AM    RDW 14.8 12/30/2024 08:03 AM    Platelets 166 12/30/2024 08:03 AM    Segmented % 56 12/30/2024 08:03 AM    Lymphocytes % 25 12/30/2024 08:03 AM    Monocytes % 15 (H) 12/30/2024 08:03 AM    Eosinophils Relative 1 12/30/2024 08:03 AM    Basophils Relative 2 (H) 12/30/2024 08:03 AM    Immature Grans % 1 12/30/2024 08:03 AM    Absolute Neutrophils 2.10 12/30/2024 08:03 AM     Lab Results   Component Value Date/Time    Potassium 4.5 12/30/2024 08:03 AM    Chloride 106 12/30/2024 08:03 AM    CO2 30 12/30/2024 08:03 AM    BUN 14 12/30/2024 08:03 AM    Creatinine 0.58 (L) 12/30/2024 08:03 AM    Glucose, Fasting 80 12/30/2024 08:03 AM    Calcium 8.9 12/30/2024 08:03 AM    AST 18 12/30/2024 08:03 AM    ALT 16 12/30/2024 08:03 AM    Alkaline Phosphatase 58 12/30/2024 08:03 AM    Total Protein 6.1 (L) 12/30/2024 08:03 AM    Albumin 4.0 12/30/2024 08:03 AM    Total Bilirubin 0.41 12/30/2024 08:03 AM    eGFR 110 12/30/2024 08:03 AM     Lab Results   Component Value Date/Time    WBC 3.68 (L) 12/30/2024 08:03 AM    RBC 3.99 12/30/2024 08:03 AM    Hemoglobin 12.0 12/30/2024 08:03 AM    Hematocrit 37.7 12/30/2024 08:03 AM    MCV 95 12/30/2024 08:03 AM    MCH 30.1 12/30/2024 08:03 AM    RDW 14.8 12/30/2024 08:03 AM    Platelets 166 12/30/2024 08:03 AM    Segmented % 56  12/30/2024 08:03 AM    Lymphocytes % 25 12/30/2024 08:03 AM    Monocytes % 15 (H) 12/30/2024 08:03 AM    Eosinophils Relative 1 12/30/2024 08:03 AM    Basophils Relative 2 (H) 12/30/2024 08:03 AM    Immature Grans % 1 12/30/2024 08:03 AM    Absolute Neutrophils 2.10 12/30/2024 08:03 AM      Lab Results   Component Value Date/Time    Sodium 139 12/30/2024 08:03 AM    Potassium 4.5 12/30/2024 08:03 AM    Chloride 106 12/30/2024 08:03 AM    CO2 30 12/30/2024 08:03 AM    ANION GAP 3 (L) 12/30/2024 08:03 AM    BUN 14 12/30/2024 08:03 AM    Creatinine 0.58 (L) 12/30/2024 08:03 AM    Glucose 102 11/05/2024 10:22 AM    Glucose, Fasting 80 12/30/2024 08:03 AM    Calcium 8.9 12/30/2024 08:03 AM    AST 18 12/30/2024 08:03 AM    ALT 16 12/30/2024 08:03 AM    Alkaline Phosphatase 58 12/30/2024 08:03 AM    Total Protein 6.1 (L) 12/30/2024 08:03 AM    Albumin 4.0 12/30/2024 08:03 AM    Total Bilirubin 0.41 12/30/2024 08:03 AM    eGFR 110 12/30/2024 08:03 AM        Radiology Results Review : No pertinent imaging studies reviewed.

## 2025-01-22 NOTE — TELEPHONE ENCOUNTER
Colleen called from [facility] Prior Authorization department. They received an electronic PA request for Verzenio tablets from the office today. Colleen will like us to fax documentation stating patient's Hormone receptor status as well as her  HER 2 status, to allow them complete the prior authorization.    Prior authorization # [id number] -To be included in the fax    Fax # [phone]- Please, fax requested information to this number    Phone # [phone]- Please, call this number with any questions.

## 2025-01-22 NOTE — ASSESSMENT & PLAN NOTE
Anatomical stage IIIa and pathological stage Ib (pT2, pN2a, cM0, G2), ER positive, MA positive, HER2/divina 1+ negative, 4 /10 involved with metastatic disease with extranodal extension. Oncotype DX recurrence score of 20.     Status post radical bilateral mastectomy on 9/19/2024.  In the left breast mastectomy was found to have noninvasive lobular neoplasia.  She completed 4 cycles of adjuvant chemotherapy with docetaxel and cyclophosphamide on 1/2/2025.    The patient is about to get started on adjuvant radiation to the right mastectomy site and right axilla which will last for about 5-1/2 weeks.    We did discuss the need for adjuvant endocrine therapy with one of the aromatase inhibitors along with the LHRH agonist for ovarian suppression.  The patient was educated extensively about anastrozole and the potential side effects/toxicity.  She will be also started on Lupron injections on a monthly basis to suppress her ovarian function.  We then discussed the potential benefit of adjuvant CDK 4/6 inhibitor.  She would be a candidate for abemaciclib since fits the high risk profile with 4 lymph nodes involved with malignancy.  The patient was also educated about abemaciclib side effects and toxicity including diarrhea which may require dose adjustment.  She will be started on 150 mg twice a day for a total of 2 years.    We did also discuss the need for a bone density scan and vitamin D and calcium supplements on a daily basis.      Orders:    CBC and differential; Future    Comprehensive metabolic panel; Future    Magnesium; Future    Vitamin D 25 hydroxy; Future

## 2025-01-22 NOTE — PROGRESS NOTES
Chemotherapy education provided to patient    Plan is to start after radiation treatment completed:   Lupron injection monthly  Anastrozole 1m PO daily  Abemeciclib (Verzino) 150mg PO BID       Provided and reviewed medication information sheets with patient.  Reviewed calcium and vitamin D recommendations.      Prescriptions reviewed and sent to pharmacy.  Email sent to oral chemotherapy team     Chemotherapy consent signed.      No additional questions at this time

## 2025-01-23 ENCOUNTER — APPOINTMENT (OUTPATIENT)
Dept: RADIATION ONCOLOGY | Facility: CLINIC | Age: 50
End: 2025-01-23
Attending: RADIOLOGY
Payer: COMMERCIAL

## 2025-01-23 ENCOUNTER — APPOINTMENT (OUTPATIENT)
Dept: RADIATION ONCOLOGY | Facility: CLINIC | Age: 50
End: 2025-01-23
Payer: COMMERCIAL

## 2025-01-23 LAB
RAD ONC ARIA COURSE FIRST TREATMENT DATE: NORMAL
RAD ONC ARIA COURSE ID: NORMAL
RAD ONC ARIA COURSE INTENT: NORMAL
RAD ONC ARIA COURSE LAST TREATMENT DATE: NORMAL
RAD ONC ARIA COURSE SESSION NUMBER: 2
RAD ONC ARIA COURSE START DATE: NORMAL
RAD ONC ARIA COURSE TREATMENT ELAPSED DAYS: 1
RAD ONC ARIA PLAN FRACTIONS TREATED TO DATE: 2
RAD ONC ARIA PLAN ID: NORMAL
RAD ONC ARIA PLAN PRESCRIBED DOSE PER FRACTION: 2 GY
RAD ONC ARIA PLAN PRIMARY REFERENCE POINT: NORMAL
RAD ONC ARIA PLAN TOTAL FRACTIONS PRESCRIBED: 25
RAD ONC ARIA PLAN TOTAL PRESCRIBED DOSE: 5000 CGY
RAD ONC ARIA REFERENCE POINT DOSAGE GIVEN TO DATE: 4 GY
RAD ONC ARIA REFERENCE POINT ID: NORMAL
RAD ONC ARIA REFERENCE POINT SESSION DOSAGE GIVEN: 2 GY

## 2025-01-23 PROCEDURE — 77385 HB NTSTY MODUL RAD TX DLVR SMPL: CPT | Performed by: STUDENT IN AN ORGANIZED HEALTH CARE EDUCATION/TRAINING PROGRAM

## 2025-01-23 PROCEDURE — 77014 CHG CT GUIDANCE RADIATION THERAPY FLDS PLACEMENT: CPT | Performed by: STUDENT IN AN ORGANIZED HEALTH CARE EDUCATION/TRAINING PROGRAM

## 2025-01-24 ENCOUNTER — APPOINTMENT (OUTPATIENT)
Dept: RADIATION ONCOLOGY | Facility: CLINIC | Age: 50
End: 2025-01-24
Payer: COMMERCIAL

## 2025-01-24 ENCOUNTER — DOCUMENTATION (OUTPATIENT)
Age: 50
End: 2025-01-24

## 2025-01-24 ENCOUNTER — APPOINTMENT (OUTPATIENT)
Dept: RADIATION ONCOLOGY | Facility: CLINIC | Age: 50
End: 2025-01-24
Attending: RADIOLOGY
Payer: COMMERCIAL

## 2025-01-24 LAB
RAD ONC ARIA COURSE FIRST TREATMENT DATE: NORMAL
RAD ONC ARIA COURSE ID: NORMAL
RAD ONC ARIA COURSE INTENT: NORMAL
RAD ONC ARIA COURSE LAST TREATMENT DATE: NORMAL
RAD ONC ARIA COURSE SESSION NUMBER: 3
RAD ONC ARIA COURSE START DATE: NORMAL
RAD ONC ARIA COURSE TREATMENT ELAPSED DAYS: 2
RAD ONC ARIA PLAN FRACTIONS TREATED TO DATE: 3
RAD ONC ARIA PLAN ID: NORMAL
RAD ONC ARIA PLAN PRESCRIBED DOSE PER FRACTION: 2 GY
RAD ONC ARIA PLAN PRIMARY REFERENCE POINT: NORMAL
RAD ONC ARIA PLAN TOTAL FRACTIONS PRESCRIBED: 25
RAD ONC ARIA PLAN TOTAL PRESCRIBED DOSE: 5000 CGY
RAD ONC ARIA REFERENCE POINT DOSAGE GIVEN TO DATE: 6 GY
RAD ONC ARIA REFERENCE POINT ID: NORMAL
RAD ONC ARIA REFERENCE POINT SESSION DOSAGE GIVEN: 2 GY

## 2025-01-24 PROCEDURE — 77014 CHG CT GUIDANCE RADIATION THERAPY FLDS PLACEMENT: CPT | Performed by: RADIOLOGY

## 2025-01-24 PROCEDURE — 77385 HB NTSTY MODUL RAD TX DLVR SMPL: CPT | Performed by: RADIOLOGY

## 2025-01-24 NOTE — PROGRESS NOTES
Received request from clinical for patient to start on Verzenio 150 MG BID. Auth has been approved    Authorized from January 23, 2025 to January 23, 2026

## 2025-01-27 ENCOUNTER — APPOINTMENT (OUTPATIENT)
Dept: RADIATION ONCOLOGY | Facility: CLINIC | Age: 50
End: 2025-01-27
Payer: COMMERCIAL

## 2025-01-27 ENCOUNTER — APPOINTMENT (OUTPATIENT)
Dept: RADIATION ONCOLOGY | Facility: CLINIC | Age: 50
End: 2025-01-27
Attending: RADIOLOGY
Payer: COMMERCIAL

## 2025-01-27 LAB
RAD ONC ARIA COURSE FIRST TREATMENT DATE: NORMAL
RAD ONC ARIA COURSE ID: NORMAL
RAD ONC ARIA COURSE INTENT: NORMAL
RAD ONC ARIA COURSE LAST TREATMENT DATE: NORMAL
RAD ONC ARIA COURSE SESSION NUMBER: 4
RAD ONC ARIA COURSE START DATE: NORMAL
RAD ONC ARIA COURSE TREATMENT ELAPSED DAYS: 5
RAD ONC ARIA PLAN FRACTIONS TREATED TO DATE: 4
RAD ONC ARIA PLAN ID: NORMAL
RAD ONC ARIA PLAN PRESCRIBED DOSE PER FRACTION: 2 GY
RAD ONC ARIA PLAN PRIMARY REFERENCE POINT: NORMAL
RAD ONC ARIA PLAN TOTAL FRACTIONS PRESCRIBED: 25
RAD ONC ARIA PLAN TOTAL PRESCRIBED DOSE: 5000 CGY
RAD ONC ARIA REFERENCE POINT DOSAGE GIVEN TO DATE: 8 GY
RAD ONC ARIA REFERENCE POINT ID: NORMAL
RAD ONC ARIA REFERENCE POINT SESSION DOSAGE GIVEN: 2 GY

## 2025-01-27 PROCEDURE — 77385 HB NTSTY MODUL RAD TX DLVR SMPL: CPT | Performed by: RADIOLOGY

## 2025-01-27 PROCEDURE — 77014 CHG CT GUIDANCE RADIATION THERAPY FLDS PLACEMENT: CPT | Performed by: RADIOLOGY

## 2025-01-28 ENCOUNTER — APPOINTMENT (OUTPATIENT)
Dept: RADIATION ONCOLOGY | Facility: CLINIC | Age: 50
End: 2025-01-28
Payer: COMMERCIAL

## 2025-01-28 ENCOUNTER — APPOINTMENT (OUTPATIENT)
Dept: RADIATION ONCOLOGY | Facility: CLINIC | Age: 50
End: 2025-01-28
Attending: RADIOLOGY
Payer: COMMERCIAL

## 2025-01-28 LAB
RAD ONC ARIA COURSE FIRST TREATMENT DATE: NORMAL
RAD ONC ARIA COURSE ID: NORMAL
RAD ONC ARIA COURSE INTENT: NORMAL
RAD ONC ARIA COURSE LAST TREATMENT DATE: NORMAL
RAD ONC ARIA COURSE SESSION NUMBER: 5
RAD ONC ARIA COURSE START DATE: NORMAL
RAD ONC ARIA COURSE TREATMENT ELAPSED DAYS: 6
RAD ONC ARIA PLAN FRACTIONS TREATED TO DATE: 5
RAD ONC ARIA PLAN ID: NORMAL
RAD ONC ARIA PLAN PRESCRIBED DOSE PER FRACTION: 2 GY
RAD ONC ARIA PLAN PRIMARY REFERENCE POINT: NORMAL
RAD ONC ARIA PLAN TOTAL FRACTIONS PRESCRIBED: 25
RAD ONC ARIA PLAN TOTAL PRESCRIBED DOSE: 5000 CGY
RAD ONC ARIA REFERENCE POINT DOSAGE GIVEN TO DATE: 10 GY
RAD ONC ARIA REFERENCE POINT ID: NORMAL
RAD ONC ARIA REFERENCE POINT SESSION DOSAGE GIVEN: 2 GY

## 2025-01-28 PROCEDURE — 77385 HB NTSTY MODUL RAD TX DLVR SMPL: CPT | Performed by: STUDENT IN AN ORGANIZED HEALTH CARE EDUCATION/TRAINING PROGRAM

## 2025-01-28 PROCEDURE — 77336 RADIATION PHYSICS CONSULT: CPT | Performed by: RADIOLOGY

## 2025-01-28 PROCEDURE — 77014 CHG CT GUIDANCE RADIATION THERAPY FLDS PLACEMENT: CPT | Performed by: STUDENT IN AN ORGANIZED HEALTH CARE EDUCATION/TRAINING PROGRAM

## 2025-01-29 ENCOUNTER — APPOINTMENT (OUTPATIENT)
Dept: RADIATION ONCOLOGY | Facility: CLINIC | Age: 50
End: 2025-01-29
Payer: COMMERCIAL

## 2025-01-29 ENCOUNTER — APPOINTMENT (OUTPATIENT)
Dept: RADIATION ONCOLOGY | Facility: CLINIC | Age: 50
End: 2025-01-29
Attending: RADIOLOGY
Payer: COMMERCIAL

## 2025-01-29 LAB
RAD ONC ARIA COURSE FIRST TREATMENT DATE: NORMAL
RAD ONC ARIA COURSE ID: NORMAL
RAD ONC ARIA COURSE INTENT: NORMAL
RAD ONC ARIA COURSE LAST TREATMENT DATE: NORMAL
RAD ONC ARIA COURSE SESSION NUMBER: 6
RAD ONC ARIA COURSE START DATE: NORMAL
RAD ONC ARIA COURSE TREATMENT ELAPSED DAYS: 7
RAD ONC ARIA PLAN FRACTIONS TREATED TO DATE: 6
RAD ONC ARIA PLAN ID: NORMAL
RAD ONC ARIA PLAN PRESCRIBED DOSE PER FRACTION: 2 GY
RAD ONC ARIA PLAN PRIMARY REFERENCE POINT: NORMAL
RAD ONC ARIA PLAN TOTAL FRACTIONS PRESCRIBED: 25
RAD ONC ARIA PLAN TOTAL PRESCRIBED DOSE: 5000 CGY
RAD ONC ARIA REFERENCE POINT DOSAGE GIVEN TO DATE: 12 GY
RAD ONC ARIA REFERENCE POINT ID: NORMAL
RAD ONC ARIA REFERENCE POINT SESSION DOSAGE GIVEN: 2 GY

## 2025-01-29 PROCEDURE — 77014 CHG CT GUIDANCE RADIATION THERAPY FLDS PLACEMENT: CPT | Performed by: RADIOLOGY

## 2025-01-29 PROCEDURE — 77427 RADIATION TX MANAGEMENT X5: CPT | Performed by: STUDENT IN AN ORGANIZED HEALTH CARE EDUCATION/TRAINING PROGRAM

## 2025-01-29 PROCEDURE — 77385 HB NTSTY MODUL RAD TX DLVR SMPL: CPT | Performed by: RADIOLOGY

## 2025-01-30 ENCOUNTER — APPOINTMENT (OUTPATIENT)
Dept: RADIATION ONCOLOGY | Facility: CLINIC | Age: 50
End: 2025-01-30
Payer: COMMERCIAL

## 2025-01-30 ENCOUNTER — APPOINTMENT (OUTPATIENT)
Dept: RADIATION ONCOLOGY | Facility: CLINIC | Age: 50
End: 2025-01-30
Attending: RADIOLOGY
Payer: COMMERCIAL

## 2025-01-30 LAB
RAD ONC ARIA COURSE FIRST TREATMENT DATE: NORMAL
RAD ONC ARIA COURSE ID: NORMAL
RAD ONC ARIA COURSE INTENT: NORMAL
RAD ONC ARIA COURSE LAST TREATMENT DATE: NORMAL
RAD ONC ARIA COURSE SESSION NUMBER: 7
RAD ONC ARIA COURSE START DATE: NORMAL
RAD ONC ARIA COURSE TREATMENT ELAPSED DAYS: 8
RAD ONC ARIA PLAN FRACTIONS TREATED TO DATE: 7
RAD ONC ARIA PLAN ID: NORMAL
RAD ONC ARIA PLAN PRESCRIBED DOSE PER FRACTION: 2 GY
RAD ONC ARIA PLAN PRIMARY REFERENCE POINT: NORMAL
RAD ONC ARIA PLAN TOTAL FRACTIONS PRESCRIBED: 25
RAD ONC ARIA PLAN TOTAL PRESCRIBED DOSE: 5000 CGY
RAD ONC ARIA REFERENCE POINT DOSAGE GIVEN TO DATE: 14 GY
RAD ONC ARIA REFERENCE POINT ID: NORMAL
RAD ONC ARIA REFERENCE POINT SESSION DOSAGE GIVEN: 2 GY

## 2025-01-30 PROCEDURE — 77014 CHG CT GUIDANCE RADIATION THERAPY FLDS PLACEMENT: CPT | Performed by: STUDENT IN AN ORGANIZED HEALTH CARE EDUCATION/TRAINING PROGRAM

## 2025-01-30 PROCEDURE — 77385 HB NTSTY MODUL RAD TX DLVR SMPL: CPT | Performed by: STUDENT IN AN ORGANIZED HEALTH CARE EDUCATION/TRAINING PROGRAM

## 2025-01-31 ENCOUNTER — APPOINTMENT (OUTPATIENT)
Dept: RADIATION ONCOLOGY | Facility: CLINIC | Age: 50
End: 2025-01-31
Attending: RADIOLOGY
Payer: COMMERCIAL

## 2025-01-31 ENCOUNTER — APPOINTMENT (OUTPATIENT)
Dept: RADIATION ONCOLOGY | Facility: CLINIC | Age: 50
End: 2025-01-31
Payer: COMMERCIAL

## 2025-01-31 LAB
RAD ONC ARIA COURSE FIRST TREATMENT DATE: NORMAL
RAD ONC ARIA COURSE ID: NORMAL
RAD ONC ARIA COURSE INTENT: NORMAL
RAD ONC ARIA COURSE LAST TREATMENT DATE: NORMAL
RAD ONC ARIA COURSE SESSION NUMBER: 8
RAD ONC ARIA COURSE START DATE: NORMAL
RAD ONC ARIA COURSE TREATMENT ELAPSED DAYS: 9
RAD ONC ARIA PLAN FRACTIONS TREATED TO DATE: 8
RAD ONC ARIA PLAN ID: NORMAL
RAD ONC ARIA PLAN PRESCRIBED DOSE PER FRACTION: 2 GY
RAD ONC ARIA PLAN PRIMARY REFERENCE POINT: NORMAL
RAD ONC ARIA PLAN TOTAL FRACTIONS PRESCRIBED: 25
RAD ONC ARIA PLAN TOTAL PRESCRIBED DOSE: 5000 CGY
RAD ONC ARIA REFERENCE POINT DOSAGE GIVEN TO DATE: 16 GY
RAD ONC ARIA REFERENCE POINT ID: NORMAL
RAD ONC ARIA REFERENCE POINT SESSION DOSAGE GIVEN: 2 GY

## 2025-01-31 PROCEDURE — 77385 HB NTSTY MODUL RAD TX DLVR SMPL: CPT | Performed by: RADIOLOGY

## 2025-01-31 PROCEDURE — 77014 CHG CT GUIDANCE RADIATION THERAPY FLDS PLACEMENT: CPT | Performed by: RADIOLOGY

## 2025-02-03 ENCOUNTER — APPOINTMENT (OUTPATIENT)
Dept: RADIATION ONCOLOGY | Facility: CLINIC | Age: 50
End: 2025-02-03
Attending: RADIOLOGY
Payer: COMMERCIAL

## 2025-02-03 LAB
RAD ONC ARIA COURSE FIRST TREATMENT DATE: NORMAL
RAD ONC ARIA COURSE ID: NORMAL
RAD ONC ARIA COURSE INTENT: NORMAL
RAD ONC ARIA COURSE LAST TREATMENT DATE: NORMAL
RAD ONC ARIA COURSE SESSION NUMBER: 9
RAD ONC ARIA COURSE START DATE: NORMAL
RAD ONC ARIA COURSE TREATMENT ELAPSED DAYS: 12
RAD ONC ARIA PLAN FRACTIONS TREATED TO DATE: 9
RAD ONC ARIA PLAN ID: NORMAL
RAD ONC ARIA PLAN PRESCRIBED DOSE PER FRACTION: 2 GY
RAD ONC ARIA PLAN PRIMARY REFERENCE POINT: NORMAL
RAD ONC ARIA PLAN TOTAL FRACTIONS PRESCRIBED: 25
RAD ONC ARIA PLAN TOTAL PRESCRIBED DOSE: 5000 CGY
RAD ONC ARIA REFERENCE POINT DOSAGE GIVEN TO DATE: 18 GY
RAD ONC ARIA REFERENCE POINT ID: NORMAL
RAD ONC ARIA REFERENCE POINT SESSION DOSAGE GIVEN: 2 GY

## 2025-02-03 PROCEDURE — 77014 CHG CT GUIDANCE RADIATION THERAPY FLDS PLACEMENT: CPT | Performed by: RADIOLOGY

## 2025-02-03 PROCEDURE — 77385 HB NTSTY MODUL RAD TX DLVR SMPL: CPT | Performed by: RADIOLOGY

## 2025-02-04 ENCOUNTER — APPOINTMENT (OUTPATIENT)
Dept: RADIATION ONCOLOGY | Facility: CLINIC | Age: 50
End: 2025-02-04
Attending: RADIOLOGY
Payer: COMMERCIAL

## 2025-02-04 LAB
RAD ONC ARIA COURSE FIRST TREATMENT DATE: NORMAL
RAD ONC ARIA COURSE ID: NORMAL
RAD ONC ARIA COURSE INTENT: NORMAL
RAD ONC ARIA COURSE LAST TREATMENT DATE: NORMAL
RAD ONC ARIA COURSE SESSION NUMBER: 10
RAD ONC ARIA COURSE START DATE: NORMAL
RAD ONC ARIA COURSE TREATMENT ELAPSED DAYS: 13
RAD ONC ARIA PLAN FRACTIONS TREATED TO DATE: 10
RAD ONC ARIA PLAN ID: NORMAL
RAD ONC ARIA PLAN PRESCRIBED DOSE PER FRACTION: 2 GY
RAD ONC ARIA PLAN PRIMARY REFERENCE POINT: NORMAL
RAD ONC ARIA PLAN TOTAL FRACTIONS PRESCRIBED: 25
RAD ONC ARIA PLAN TOTAL PRESCRIBED DOSE: 5000 CGY
RAD ONC ARIA REFERENCE POINT DOSAGE GIVEN TO DATE: 20 GY
RAD ONC ARIA REFERENCE POINT ID: NORMAL
RAD ONC ARIA REFERENCE POINT SESSION DOSAGE GIVEN: 2 GY

## 2025-02-04 PROCEDURE — 77385 HB NTSTY MODUL RAD TX DLVR SMPL: CPT | Performed by: RADIOLOGY

## 2025-02-04 PROCEDURE — 77336 RADIATION PHYSICS CONSULT: CPT | Performed by: STUDENT IN AN ORGANIZED HEALTH CARE EDUCATION/TRAINING PROGRAM

## 2025-02-04 PROCEDURE — 77014 CHG CT GUIDANCE RADIATION THERAPY FLDS PLACEMENT: CPT | Performed by: RADIOLOGY

## 2025-02-05 ENCOUNTER — APPOINTMENT (OUTPATIENT)
Dept: RADIATION ONCOLOGY | Facility: CLINIC | Age: 50
End: 2025-02-05
Attending: RADIOLOGY
Payer: COMMERCIAL

## 2025-02-05 LAB
RAD ONC ARIA COURSE FIRST TREATMENT DATE: NORMAL
RAD ONC ARIA COURSE ID: NORMAL
RAD ONC ARIA COURSE INTENT: NORMAL
RAD ONC ARIA COURSE LAST TREATMENT DATE: NORMAL
RAD ONC ARIA COURSE SESSION NUMBER: 11
RAD ONC ARIA COURSE START DATE: NORMAL
RAD ONC ARIA COURSE TREATMENT ELAPSED DAYS: 14
RAD ONC ARIA PLAN FRACTIONS TREATED TO DATE: 11
RAD ONC ARIA PLAN ID: NORMAL
RAD ONC ARIA PLAN PRESCRIBED DOSE PER FRACTION: 2 GY
RAD ONC ARIA PLAN PRIMARY REFERENCE POINT: NORMAL
RAD ONC ARIA PLAN TOTAL FRACTIONS PRESCRIBED: 25
RAD ONC ARIA PLAN TOTAL PRESCRIBED DOSE: 5000 CGY
RAD ONC ARIA REFERENCE POINT DOSAGE GIVEN TO DATE: 22 GY
RAD ONC ARIA REFERENCE POINT ID: NORMAL
RAD ONC ARIA REFERENCE POINT SESSION DOSAGE GIVEN: 2 GY

## 2025-02-05 PROCEDURE — 77427 RADIATION TX MANAGEMENT X5: CPT | Performed by: RADIOLOGY

## 2025-02-05 PROCEDURE — 77014 CHG CT GUIDANCE RADIATION THERAPY FLDS PLACEMENT: CPT | Performed by: RADIOLOGY

## 2025-02-05 PROCEDURE — 77385 HB NTSTY MODUL RAD TX DLVR SMPL: CPT | Performed by: RADIOLOGY

## 2025-02-06 ENCOUNTER — APPOINTMENT (OUTPATIENT)
Dept: RADIATION ONCOLOGY | Facility: CLINIC | Age: 50
End: 2025-02-06
Attending: RADIOLOGY
Payer: COMMERCIAL

## 2025-02-07 ENCOUNTER — APPOINTMENT (OUTPATIENT)
Dept: RADIATION ONCOLOGY | Facility: CLINIC | Age: 50
End: 2025-02-07
Attending: RADIOLOGY
Payer: COMMERCIAL

## 2025-02-07 LAB
RAD ONC ARIA COURSE FIRST TREATMENT DATE: NORMAL
RAD ONC ARIA COURSE ID: NORMAL
RAD ONC ARIA COURSE INTENT: NORMAL
RAD ONC ARIA COURSE LAST TREATMENT DATE: NORMAL
RAD ONC ARIA COURSE SESSION NUMBER: 12
RAD ONC ARIA COURSE START DATE: NORMAL
RAD ONC ARIA COURSE TREATMENT ELAPSED DAYS: 16
RAD ONC ARIA PLAN FRACTIONS TREATED TO DATE: 12
RAD ONC ARIA PLAN ID: NORMAL
RAD ONC ARIA PLAN PRESCRIBED DOSE PER FRACTION: 2 GY
RAD ONC ARIA PLAN PRIMARY REFERENCE POINT: NORMAL
RAD ONC ARIA PLAN TOTAL FRACTIONS PRESCRIBED: 25
RAD ONC ARIA PLAN TOTAL PRESCRIBED DOSE: 5000 CGY
RAD ONC ARIA REFERENCE POINT DOSAGE GIVEN TO DATE: 24 GY
RAD ONC ARIA REFERENCE POINT ID: NORMAL
RAD ONC ARIA REFERENCE POINT SESSION DOSAGE GIVEN: 2 GY

## 2025-02-07 PROCEDURE — 77385 HB NTSTY MODUL RAD TX DLVR SMPL: CPT | Performed by: RADIOLOGY

## 2025-02-07 PROCEDURE — 77014 CHG CT GUIDANCE RADIATION THERAPY FLDS PLACEMENT: CPT | Performed by: RADIOLOGY

## 2025-02-10 ENCOUNTER — TELEPHONE (OUTPATIENT)
Age: 50
End: 2025-02-10

## 2025-02-10 ENCOUNTER — APPOINTMENT (OUTPATIENT)
Dept: RADIATION ONCOLOGY | Facility: CLINIC | Age: 50
End: 2025-02-10
Attending: RADIOLOGY
Payer: COMMERCIAL

## 2025-02-10 LAB
RAD ONC ARIA COURSE FIRST TREATMENT DATE: NORMAL
RAD ONC ARIA COURSE ID: NORMAL
RAD ONC ARIA COURSE INTENT: NORMAL
RAD ONC ARIA COURSE LAST TREATMENT DATE: NORMAL
RAD ONC ARIA COURSE SESSION NUMBER: 13
RAD ONC ARIA COURSE START DATE: NORMAL
RAD ONC ARIA COURSE TREATMENT ELAPSED DAYS: 19
RAD ONC ARIA PLAN FRACTIONS TREATED TO DATE: 13
RAD ONC ARIA PLAN ID: NORMAL
RAD ONC ARIA PLAN PRESCRIBED DOSE PER FRACTION: 2 GY
RAD ONC ARIA PLAN PRIMARY REFERENCE POINT: NORMAL
RAD ONC ARIA PLAN TOTAL FRACTIONS PRESCRIBED: 25
RAD ONC ARIA PLAN TOTAL PRESCRIBED DOSE: 5000 CGY
RAD ONC ARIA REFERENCE POINT DOSAGE GIVEN TO DATE: 26 GY
RAD ONC ARIA REFERENCE POINT ID: NORMAL
RAD ONC ARIA REFERENCE POINT SESSION DOSAGE GIVEN: 2 GY

## 2025-02-10 PROCEDURE — 77385 HB NTSTY MODUL RAD TX DLVR SMPL: CPT | Performed by: RADIOLOGY

## 2025-02-10 PROCEDURE — 77014 CHG CT GUIDANCE RADIATION THERAPY FLDS PLACEMENT: CPT | Performed by: RADIOLOGY

## 2025-02-10 NOTE — TELEPHONE ENCOUNTER
Pt calling in requesting to speak to Dr. Hyman's team regarding medications that she will be starting next month. Pt denied CTS.

## 2025-02-11 ENCOUNTER — TELEPHONE (OUTPATIENT)
Dept: HEMATOLOGY ONCOLOGY | Facility: CLINIC | Age: 50
End: 2025-02-11

## 2025-02-11 ENCOUNTER — APPOINTMENT (OUTPATIENT)
Dept: RADIATION ONCOLOGY | Facility: CLINIC | Age: 50
End: 2025-02-11
Attending: RADIOLOGY
Payer: COMMERCIAL

## 2025-02-11 DIAGNOSIS — C50.511 MALIGNANT NEOPLASM OF LOWER-OUTER QUADRANT OF RIGHT BREAST OF FEMALE, ESTROGEN RECEPTOR POSITIVE (HCC): Primary | ICD-10-CM

## 2025-02-11 DIAGNOSIS — C50.511 MALIGNANT NEOPLASM OF LOWER-OUTER QUADRANT OF RIGHT BREAST OF FEMALE, ESTROGEN RECEPTOR POSITIVE (HCC): ICD-10-CM

## 2025-02-11 DIAGNOSIS — Z17.0 MALIGNANT NEOPLASM OF LOWER-OUTER QUADRANT OF RIGHT BREAST OF FEMALE, ESTROGEN RECEPTOR POSITIVE (HCC): Primary | ICD-10-CM

## 2025-02-11 DIAGNOSIS — Z17.0 MALIGNANT NEOPLASM OF LOWER-OUTER QUADRANT OF RIGHT BREAST OF FEMALE, ESTROGEN RECEPTOR POSITIVE (HCC): ICD-10-CM

## 2025-02-11 LAB
RAD ONC ARIA COURSE FIRST TREATMENT DATE: NORMAL
RAD ONC ARIA COURSE ID: NORMAL
RAD ONC ARIA COURSE INTENT: NORMAL
RAD ONC ARIA COURSE LAST TREATMENT DATE: NORMAL
RAD ONC ARIA COURSE SESSION NUMBER: 14
RAD ONC ARIA COURSE START DATE: NORMAL
RAD ONC ARIA COURSE TREATMENT ELAPSED DAYS: 20
RAD ONC ARIA PLAN FRACTIONS TREATED TO DATE: 14
RAD ONC ARIA PLAN ID: NORMAL
RAD ONC ARIA PLAN PRESCRIBED DOSE PER FRACTION: 2 GY
RAD ONC ARIA PLAN PRIMARY REFERENCE POINT: NORMAL
RAD ONC ARIA PLAN TOTAL FRACTIONS PRESCRIBED: 25
RAD ONC ARIA PLAN TOTAL PRESCRIBED DOSE: 5000 CGY
RAD ONC ARIA REFERENCE POINT DOSAGE GIVEN TO DATE: 28 GY
RAD ONC ARIA REFERENCE POINT ID: NORMAL
RAD ONC ARIA REFERENCE POINT SESSION DOSAGE GIVEN: 2 GY

## 2025-02-11 PROCEDURE — 77385 HB NTSTY MODUL RAD TX DLVR SMPL: CPT | Performed by: STUDENT IN AN ORGANIZED HEALTH CARE EDUCATION/TRAINING PROGRAM

## 2025-02-11 PROCEDURE — 77014 CHG CT GUIDANCE RADIATION THERAPY FLDS PLACEMENT: CPT | Performed by: STUDENT IN AN ORGANIZED HEALTH CARE EDUCATION/TRAINING PROGRAM

## 2025-02-11 NOTE — TELEPHONE ENCOUNTER
Returned call to pt to let her know that most likely she is not post menopausal as she had a menstrual cycle in Dec. A woman is considered post menopausal when no menses for over one year. Pt seemed to be confsued as to how to tell if she was post menopausal. Pt asked if she could have ovaries out and then not need Verzenio, explained to pt that the verzenio was decreasing her risk of breast cancer recurrence. Pt  did nto have any more questions but did ask if she would have side effects from Verzenio the whole two years she would take it and I let her know it is hard to tell.

## 2025-02-11 NOTE — TELEPHONE ENCOUNTER
Phoned pt to answer questions and had to leave a voice message asking pt to return call to [phone] or send a My chart message regarding her questions.

## 2025-02-12 ENCOUNTER — APPOINTMENT (OUTPATIENT)
Dept: RADIATION ONCOLOGY | Facility: CLINIC | Age: 50
End: 2025-02-12
Attending: RADIOLOGY
Payer: COMMERCIAL

## 2025-02-12 LAB
RAD ONC ARIA COURSE FIRST TREATMENT DATE: NORMAL
RAD ONC ARIA COURSE ID: NORMAL
RAD ONC ARIA COURSE INTENT: NORMAL
RAD ONC ARIA COURSE LAST TREATMENT DATE: NORMAL
RAD ONC ARIA COURSE SESSION NUMBER: 15
RAD ONC ARIA COURSE START DATE: NORMAL
RAD ONC ARIA COURSE TREATMENT ELAPSED DAYS: 21
RAD ONC ARIA PLAN FRACTIONS TREATED TO DATE: 15
RAD ONC ARIA PLAN ID: NORMAL
RAD ONC ARIA PLAN PRESCRIBED DOSE PER FRACTION: 2 GY
RAD ONC ARIA PLAN PRIMARY REFERENCE POINT: NORMAL
RAD ONC ARIA PLAN TOTAL FRACTIONS PRESCRIBED: 25
RAD ONC ARIA PLAN TOTAL PRESCRIBED DOSE: 5000 CGY
RAD ONC ARIA REFERENCE POINT DOSAGE GIVEN TO DATE: 30 GY
RAD ONC ARIA REFERENCE POINT ID: NORMAL
RAD ONC ARIA REFERENCE POINT SESSION DOSAGE GIVEN: 2 GY

## 2025-02-12 PROCEDURE — 77336 RADIATION PHYSICS CONSULT: CPT | Performed by: RADIOLOGY

## 2025-02-12 PROCEDURE — 77385 HB NTSTY MODUL RAD TX DLVR SMPL: CPT | Performed by: RADIOLOGY

## 2025-02-12 PROCEDURE — 77014 CHG CT GUIDANCE RADIATION THERAPY FLDS PLACEMENT: CPT | Performed by: RADIOLOGY

## 2025-02-13 ENCOUNTER — APPOINTMENT (OUTPATIENT)
Dept: RADIATION ONCOLOGY | Facility: CLINIC | Age: 50
End: 2025-02-13
Attending: RADIOLOGY
Payer: COMMERCIAL

## 2025-02-13 ENCOUNTER — TELEPHONE (OUTPATIENT)
Dept: SURGICAL ONCOLOGY | Facility: CLINIC | Age: 50
End: 2025-02-13

## 2025-02-13 LAB
RAD ONC ARIA COURSE FIRST TREATMENT DATE: NORMAL
RAD ONC ARIA COURSE ID: NORMAL
RAD ONC ARIA COURSE INTENT: NORMAL
RAD ONC ARIA COURSE LAST TREATMENT DATE: NORMAL
RAD ONC ARIA COURSE SESSION NUMBER: 16
RAD ONC ARIA COURSE START DATE: NORMAL
RAD ONC ARIA COURSE TREATMENT ELAPSED DAYS: 22
RAD ONC ARIA PLAN FRACTIONS TREATED TO DATE: 16
RAD ONC ARIA PLAN ID: NORMAL
RAD ONC ARIA PLAN PRESCRIBED DOSE PER FRACTION: 2 GY
RAD ONC ARIA PLAN PRIMARY REFERENCE POINT: NORMAL
RAD ONC ARIA PLAN TOTAL FRACTIONS PRESCRIBED: 25
RAD ONC ARIA PLAN TOTAL PRESCRIBED DOSE: 5000 CGY
RAD ONC ARIA REFERENCE POINT DOSAGE GIVEN TO DATE: 32 GY
RAD ONC ARIA REFERENCE POINT ID: NORMAL
RAD ONC ARIA REFERENCE POINT SESSION DOSAGE GIVEN: 2 GY

## 2025-02-13 PROCEDURE — 77385 HB NTSTY MODUL RAD TX DLVR SMPL: CPT | Performed by: STUDENT IN AN ORGANIZED HEALTH CARE EDUCATION/TRAINING PROGRAM

## 2025-02-13 PROCEDURE — 77014 CHG CT GUIDANCE RADIATION THERAPY FLDS PLACEMENT: CPT | Performed by: STUDENT IN AN ORGANIZED HEALTH CARE EDUCATION/TRAINING PROGRAM

## 2025-02-13 PROCEDURE — 77427 RADIATION TX MANAGEMENT X5: CPT | Performed by: RADIOLOGY

## 2025-02-13 NOTE — TELEPHONE ENCOUNTER
Good Afternoon    We received a Verzenio Rx for Stacey Villatoro, 1975.    Her PA is on file with [facility] until 1/23/2026- she has a current estimated copay of $504.12- has there been a copay card obtained for the patient yet?    Once copay card assistance obtained we will send this to our larger specialty mail site for direct shipment to patient.    TY    Rosa Patino PharmSIRI, CSP Pharmacy Manager, [facility]  p [phone]  c [phone]  f [phone]      From: Tressa Shukla <[email or web address]>   Sent: Thursday, February 13, 2025 8:23 AM  To: 'Rosa Dugan' <[email or web address]>; Aliya Pepper <[email or web address]>; [facility] <[email or web address]>  Cc: [facility] <[email or web address]>; Maggie Novak <[email or web address]>; Emy Betancur <[email or web address]>; [facility] [employer] <[email or web address]>  Subject: RE: [EXTERNAL] Dr srini Wade-     FA Team please see below, thank you    Enroll patient for Co Pay Card

## 2025-02-14 ENCOUNTER — APPOINTMENT (OUTPATIENT)
Dept: RADIATION ONCOLOGY | Facility: CLINIC | Age: 50
End: 2025-02-14
Attending: RADIOLOGY
Payer: COMMERCIAL

## 2025-02-14 ENCOUNTER — TELEPHONE (OUTPATIENT)
Dept: SURGICAL ONCOLOGY | Facility: CLINIC | Age: 50
End: 2025-02-14

## 2025-02-14 LAB
RAD ONC ARIA COURSE FIRST TREATMENT DATE: NORMAL
RAD ONC ARIA COURSE ID: NORMAL
RAD ONC ARIA COURSE INTENT: NORMAL
RAD ONC ARIA COURSE LAST TREATMENT DATE: NORMAL
RAD ONC ARIA COURSE SESSION NUMBER: 17
RAD ONC ARIA COURSE START DATE: NORMAL
RAD ONC ARIA COURSE TREATMENT ELAPSED DAYS: 23
RAD ONC ARIA PLAN FRACTIONS TREATED TO DATE: 17
RAD ONC ARIA PLAN ID: NORMAL
RAD ONC ARIA PLAN PRESCRIBED DOSE PER FRACTION: 2 GY
RAD ONC ARIA PLAN PRIMARY REFERENCE POINT: NORMAL
RAD ONC ARIA PLAN TOTAL FRACTIONS PRESCRIBED: 25
RAD ONC ARIA PLAN TOTAL PRESCRIBED DOSE: 5000 CGY
RAD ONC ARIA REFERENCE POINT DOSAGE GIVEN TO DATE: 34 GY
RAD ONC ARIA REFERENCE POINT ID: NORMAL
RAD ONC ARIA REFERENCE POINT SESSION DOSAGE GIVEN: 2 GY

## 2025-02-14 PROCEDURE — 77385 HB NTSTY MODUL RAD TX DLVR SMPL: CPT | Performed by: RADIOLOGY

## 2025-02-14 PROCEDURE — 77014 CHG CT GUIDANCE RADIATION THERAPY FLDS PLACEMENT: CPT | Performed by: RADIOLOGY

## 2025-02-14 NOTE — TELEPHONE ENCOUNTER
I received a call today from the patient regarding her receiving an e mail to verify her co pay card and because she was unaware that a co pay card was being processed on her behalf for security reasons was de-activated. After our conversation I called [facility] and spoke with Rodrigo CAN who walked me through the process of obtaining a new card for the patient.     Bin: [id number]  PCN: [id number]  Group: [id number]  ID: [id number]  Ends: 12/31/25    I then sent an e mail to the [facility] team with the updated card information and they asked if the patient was contacted for I responded that I am in contact with her and I called the patient to provide her with the new card information.

## 2025-02-17 ENCOUNTER — DOCUMENTATION (OUTPATIENT)
Dept: HEMATOLOGY ONCOLOGY | Facility: CLINIC | Age: 50
End: 2025-02-17

## 2025-02-17 ENCOUNTER — APPOINTMENT (OUTPATIENT)
Dept: RADIATION ONCOLOGY | Facility: CLINIC | Age: 50
End: 2025-02-17
Attending: RADIOLOGY
Payer: COMMERCIAL

## 2025-02-17 ENCOUNTER — TELEPHONE (OUTPATIENT)
Dept: HEMATOLOGY ONCOLOGY | Facility: CLINIC | Age: 50
End: 2025-02-17

## 2025-02-17 LAB
RAD ONC ARIA COURSE FIRST TREATMENT DATE: NORMAL
RAD ONC ARIA COURSE ID: NORMAL
RAD ONC ARIA COURSE INTENT: NORMAL
RAD ONC ARIA COURSE LAST TREATMENT DATE: NORMAL
RAD ONC ARIA COURSE SESSION NUMBER: 18
RAD ONC ARIA COURSE START DATE: NORMAL
RAD ONC ARIA COURSE TREATMENT ELAPSED DAYS: 26
RAD ONC ARIA PLAN FRACTIONS TREATED TO DATE: 18
RAD ONC ARIA PLAN ID: NORMAL
RAD ONC ARIA PLAN PRESCRIBED DOSE PER FRACTION: 2 GY
RAD ONC ARIA PLAN PRIMARY REFERENCE POINT: NORMAL
RAD ONC ARIA PLAN TOTAL FRACTIONS PRESCRIBED: 25
RAD ONC ARIA PLAN TOTAL PRESCRIBED DOSE: 5000 CGY
RAD ONC ARIA REFERENCE POINT DOSAGE GIVEN TO DATE: 36 GY
RAD ONC ARIA REFERENCE POINT ID: NORMAL
RAD ONC ARIA REFERENCE POINT SESSION DOSAGE GIVEN: 2 GY

## 2025-02-17 PROCEDURE — 77385 HB NTSTY MODUL RAD TX DLVR SMPL: CPT | Performed by: RADIOLOGY

## 2025-02-17 PROCEDURE — 77014 CHG CT GUIDANCE RADIATION THERAPY FLDS PLACEMENT: CPT | Performed by: RADIOLOGY

## 2025-02-17 NOTE — TELEPHONE ENCOUNTER
Patient calling in reporting retirement through chemotherapy she stopped having menstrual periods, about 3 months ago.  She reports currently getting radiation treatment then well start Lupron.  She is asking if she is in menopause, does she still need to take the Lupron?  I informed her that I will have someone in her care team get back to her her on her question.  She thanked me.

## 2025-02-18 ENCOUNTER — APPOINTMENT (OUTPATIENT)
Dept: RADIATION ONCOLOGY | Facility: CLINIC | Age: 50
End: 2025-02-18
Attending: RADIOLOGY
Payer: COMMERCIAL

## 2025-02-18 LAB
RAD ONC ARIA COURSE FIRST TREATMENT DATE: NORMAL
RAD ONC ARIA COURSE ID: NORMAL
RAD ONC ARIA COURSE INTENT: NORMAL
RAD ONC ARIA COURSE LAST TREATMENT DATE: NORMAL
RAD ONC ARIA COURSE SESSION NUMBER: 19
RAD ONC ARIA COURSE START DATE: NORMAL
RAD ONC ARIA COURSE TREATMENT ELAPSED DAYS: 27
RAD ONC ARIA PLAN FRACTIONS TREATED TO DATE: 19
RAD ONC ARIA PLAN ID: NORMAL
RAD ONC ARIA PLAN PRESCRIBED DOSE PER FRACTION: 2 GY
RAD ONC ARIA PLAN PRIMARY REFERENCE POINT: NORMAL
RAD ONC ARIA PLAN TOTAL FRACTIONS PRESCRIBED: 25
RAD ONC ARIA PLAN TOTAL PRESCRIBED DOSE: 5000 CGY
RAD ONC ARIA REFERENCE POINT DOSAGE GIVEN TO DATE: 38 GY
RAD ONC ARIA REFERENCE POINT ID: NORMAL
RAD ONC ARIA REFERENCE POINT SESSION DOSAGE GIVEN: 0.65 GY

## 2025-02-18 PROCEDURE — 77385 HB NTSTY MODUL RAD TX DLVR SMPL: CPT | Performed by: STUDENT IN AN ORGANIZED HEALTH CARE EDUCATION/TRAINING PROGRAM

## 2025-02-18 PROCEDURE — 77014 CHG CT GUIDANCE RADIATION THERAPY FLDS PLACEMENT: CPT | Performed by: STUDENT IN AN ORGANIZED HEALTH CARE EDUCATION/TRAINING PROGRAM

## 2025-02-19 ENCOUNTER — APPOINTMENT (OUTPATIENT)
Dept: RADIATION ONCOLOGY | Facility: CLINIC | Age: 50
End: 2025-02-19
Attending: RADIOLOGY
Payer: COMMERCIAL

## 2025-02-19 LAB
RAD ONC ARIA COURSE FIRST TREATMENT DATE: NORMAL
RAD ONC ARIA COURSE ID: NORMAL
RAD ONC ARIA COURSE INTENT: NORMAL
RAD ONC ARIA COURSE LAST TREATMENT DATE: NORMAL
RAD ONC ARIA COURSE SESSION NUMBER: 20
RAD ONC ARIA COURSE START DATE: NORMAL
RAD ONC ARIA COURSE TREATMENT ELAPSED DAYS: 28
RAD ONC ARIA PLAN FRACTIONS TREATED TO DATE: 20
RAD ONC ARIA PLAN ID: NORMAL
RAD ONC ARIA PLAN PRESCRIBED DOSE PER FRACTION: 2 GY
RAD ONC ARIA PLAN PRIMARY REFERENCE POINT: NORMAL
RAD ONC ARIA PLAN TOTAL FRACTIONS PRESCRIBED: 25
RAD ONC ARIA PLAN TOTAL PRESCRIBED DOSE: 5000 CGY
RAD ONC ARIA REFERENCE POINT DOSAGE GIVEN TO DATE: 40 GY
RAD ONC ARIA REFERENCE POINT ID: NORMAL
RAD ONC ARIA REFERENCE POINT SESSION DOSAGE GIVEN: 2 GY

## 2025-02-19 PROCEDURE — 77385 HB NTSTY MODUL RAD TX DLVR SMPL: CPT | Performed by: RADIOLOGY

## 2025-02-19 PROCEDURE — 77014 CHG CT GUIDANCE RADIATION THERAPY FLDS PLACEMENT: CPT | Performed by: RADIOLOGY

## 2025-02-19 PROCEDURE — 77336 RADIATION PHYSICS CONSULT: CPT | Performed by: RADIOLOGY

## 2025-02-19 NOTE — TELEPHONE ENCOUNTER
Attempted to return call to patient.  Left voice message that lupron will need to continue as she is not considered post menopausal until 12 months with out menstrual cycle.  Provided hopeline number for return call if questions.

## 2025-02-20 ENCOUNTER — APPOINTMENT (OUTPATIENT)
Dept: RADIATION ONCOLOGY | Facility: CLINIC | Age: 50
End: 2025-02-20
Attending: RADIOLOGY
Payer: COMMERCIAL

## 2025-02-20 LAB
RAD ONC ARIA COURSE FIRST TREATMENT DATE: NORMAL
RAD ONC ARIA COURSE ID: NORMAL
RAD ONC ARIA COURSE INTENT: NORMAL
RAD ONC ARIA COURSE LAST TREATMENT DATE: NORMAL
RAD ONC ARIA COURSE SESSION NUMBER: 21
RAD ONC ARIA COURSE START DATE: NORMAL
RAD ONC ARIA COURSE TREATMENT ELAPSED DAYS: 29
RAD ONC ARIA PLAN FRACTIONS TREATED TO DATE: 21
RAD ONC ARIA PLAN ID: NORMAL
RAD ONC ARIA PLAN PRESCRIBED DOSE PER FRACTION: 2 GY
RAD ONC ARIA PLAN PRIMARY REFERENCE POINT: NORMAL
RAD ONC ARIA PLAN TOTAL FRACTIONS PRESCRIBED: 25
RAD ONC ARIA PLAN TOTAL PRESCRIBED DOSE: 5000 CGY
RAD ONC ARIA REFERENCE POINT DOSAGE GIVEN TO DATE: 42 GY
RAD ONC ARIA REFERENCE POINT ID: NORMAL
RAD ONC ARIA REFERENCE POINT SESSION DOSAGE GIVEN: 2 GY

## 2025-02-20 PROCEDURE — 77427 RADIATION TX MANAGEMENT X5: CPT | Performed by: RADIOLOGY

## 2025-02-20 PROCEDURE — 77385 HB NTSTY MODUL RAD TX DLVR SMPL: CPT | Performed by: STUDENT IN AN ORGANIZED HEALTH CARE EDUCATION/TRAINING PROGRAM

## 2025-02-20 PROCEDURE — 77014 CHG CT GUIDANCE RADIATION THERAPY FLDS PLACEMENT: CPT | Performed by: STUDENT IN AN ORGANIZED HEALTH CARE EDUCATION/TRAINING PROGRAM

## 2025-02-21 ENCOUNTER — APPOINTMENT (OUTPATIENT)
Dept: RADIATION ONCOLOGY | Facility: CLINIC | Age: 50
End: 2025-02-21
Attending: RADIOLOGY
Payer: COMMERCIAL

## 2025-02-21 LAB
RAD ONC ARIA COURSE FIRST TREATMENT DATE: NORMAL
RAD ONC ARIA COURSE ID: NORMAL
RAD ONC ARIA COURSE INTENT: NORMAL
RAD ONC ARIA COURSE LAST TREATMENT DATE: NORMAL
RAD ONC ARIA COURSE SESSION NUMBER: 22
RAD ONC ARIA COURSE START DATE: NORMAL
RAD ONC ARIA COURSE TREATMENT ELAPSED DAYS: 30
RAD ONC ARIA PLAN FRACTIONS TREATED TO DATE: 22
RAD ONC ARIA PLAN ID: NORMAL
RAD ONC ARIA PLAN PRESCRIBED DOSE PER FRACTION: 2 GY
RAD ONC ARIA PLAN PRIMARY REFERENCE POINT: NORMAL
RAD ONC ARIA PLAN TOTAL FRACTIONS PRESCRIBED: 25
RAD ONC ARIA PLAN TOTAL PRESCRIBED DOSE: 5000 CGY
RAD ONC ARIA REFERENCE POINT DOSAGE GIVEN TO DATE: 44 GY
RAD ONC ARIA REFERENCE POINT ID: NORMAL
RAD ONC ARIA REFERENCE POINT SESSION DOSAGE GIVEN: 2 GY

## 2025-02-21 PROCEDURE — 77385 HB NTSTY MODUL RAD TX DLVR SMPL: CPT | Performed by: RADIOLOGY

## 2025-02-21 PROCEDURE — 77014 CHG CT GUIDANCE RADIATION THERAPY FLDS PLACEMENT: CPT | Performed by: RADIOLOGY

## 2025-02-24 ENCOUNTER — APPOINTMENT (OUTPATIENT)
Dept: RADIATION ONCOLOGY | Facility: CLINIC | Age: 50
End: 2025-02-24
Attending: RADIOLOGY
Payer: COMMERCIAL

## 2025-02-24 LAB
RAD ONC ARIA COURSE FIRST TREATMENT DATE: NORMAL
RAD ONC ARIA COURSE ID: NORMAL
RAD ONC ARIA COURSE INTENT: NORMAL
RAD ONC ARIA COURSE LAST TREATMENT DATE: NORMAL
RAD ONC ARIA COURSE SESSION NUMBER: 23
RAD ONC ARIA COURSE START DATE: NORMAL
RAD ONC ARIA COURSE TREATMENT ELAPSED DAYS: 33
RAD ONC ARIA PLAN FRACTIONS TREATED TO DATE: 23
RAD ONC ARIA PLAN ID: NORMAL
RAD ONC ARIA PLAN PRESCRIBED DOSE PER FRACTION: 2 GY
RAD ONC ARIA PLAN PRIMARY REFERENCE POINT: NORMAL
RAD ONC ARIA PLAN TOTAL FRACTIONS PRESCRIBED: 25
RAD ONC ARIA PLAN TOTAL PRESCRIBED DOSE: 5000 CGY
RAD ONC ARIA REFERENCE POINT DOSAGE GIVEN TO DATE: 46 GY
RAD ONC ARIA REFERENCE POINT ID: NORMAL
RAD ONC ARIA REFERENCE POINT SESSION DOSAGE GIVEN: 2 GY

## 2025-02-24 PROCEDURE — 77385 HB NTSTY MODUL RAD TX DLVR SMPL: CPT | Performed by: RADIOLOGY

## 2025-02-24 PROCEDURE — 77014 CHG CT GUIDANCE RADIATION THERAPY FLDS PLACEMENT: CPT | Performed by: RADIOLOGY

## 2025-02-25 ENCOUNTER — APPOINTMENT (OUTPATIENT)
Dept: RADIATION ONCOLOGY | Facility: CLINIC | Age: 50
End: 2025-02-25
Attending: RADIOLOGY
Payer: COMMERCIAL

## 2025-02-25 LAB
RAD ONC ARIA COURSE FIRST TREATMENT DATE: NORMAL
RAD ONC ARIA COURSE ID: NORMAL
RAD ONC ARIA COURSE INTENT: NORMAL
RAD ONC ARIA COURSE LAST TREATMENT DATE: NORMAL
RAD ONC ARIA COURSE SESSION NUMBER: 24
RAD ONC ARIA COURSE START DATE: NORMAL
RAD ONC ARIA COURSE TREATMENT ELAPSED DAYS: 34
RAD ONC ARIA PLAN FRACTIONS TREATED TO DATE: 24
RAD ONC ARIA PLAN ID: NORMAL
RAD ONC ARIA PLAN PRESCRIBED DOSE PER FRACTION: 2 GY
RAD ONC ARIA PLAN PRIMARY REFERENCE POINT: NORMAL
RAD ONC ARIA PLAN TOTAL FRACTIONS PRESCRIBED: 25
RAD ONC ARIA PLAN TOTAL PRESCRIBED DOSE: 5000 CGY
RAD ONC ARIA REFERENCE POINT DOSAGE GIVEN TO DATE: 48 GY
RAD ONC ARIA REFERENCE POINT ID: NORMAL
RAD ONC ARIA REFERENCE POINT SESSION DOSAGE GIVEN: 2 GY

## 2025-02-25 PROCEDURE — 77385 HB NTSTY MODUL RAD TX DLVR SMPL: CPT | Performed by: STUDENT IN AN ORGANIZED HEALTH CARE EDUCATION/TRAINING PROGRAM

## 2025-02-25 PROCEDURE — 77014 CHG CT GUIDANCE RADIATION THERAPY FLDS PLACEMENT: CPT | Performed by: STUDENT IN AN ORGANIZED HEALTH CARE EDUCATION/TRAINING PROGRAM

## 2025-02-26 ENCOUNTER — APPOINTMENT (OUTPATIENT)
Dept: RADIATION ONCOLOGY | Facility: CLINIC | Age: 50
End: 2025-02-26
Attending: RADIOLOGY
Payer: COMMERCIAL

## 2025-02-26 ENCOUNTER — APPOINTMENT (OUTPATIENT)
Dept: RADIATION ONCOLOGY | Facility: CLINIC | Age: 50
End: 2025-02-26
Payer: COMMERCIAL

## 2025-02-26 LAB
RAD ONC ARIA COURSE FIRST TREATMENT DATE: NORMAL
RAD ONC ARIA COURSE ID: NORMAL
RAD ONC ARIA COURSE INTENT: NORMAL
RAD ONC ARIA COURSE LAST TREATMENT DATE: NORMAL
RAD ONC ARIA COURSE SESSION NUMBER: 25
RAD ONC ARIA COURSE START DATE: NORMAL
RAD ONC ARIA COURSE TREATMENT ELAPSED DAYS: 35
RAD ONC ARIA PLAN FRACTIONS TREATED TO DATE: 25
RAD ONC ARIA PLAN ID: NORMAL
RAD ONC ARIA PLAN PRESCRIBED DOSE PER FRACTION: 2 GY
RAD ONC ARIA PLAN PRIMARY REFERENCE POINT: NORMAL
RAD ONC ARIA PLAN TOTAL FRACTIONS PRESCRIBED: 25
RAD ONC ARIA PLAN TOTAL PRESCRIBED DOSE: 5000 CGY
RAD ONC ARIA REFERENCE POINT DOSAGE GIVEN TO DATE: 50 GY
RAD ONC ARIA REFERENCE POINT ID: NORMAL
RAD ONC ARIA REFERENCE POINT SESSION DOSAGE GIVEN: 2 GY

## 2025-02-26 PROCEDURE — 77014 CHG CT GUIDANCE RADIATION THERAPY FLDS PLACEMENT: CPT | Performed by: RADIOLOGY

## 2025-02-26 PROCEDURE — 77385 HB NTSTY MODUL RAD TX DLVR SMPL: CPT | Performed by: RADIOLOGY

## 2025-02-26 PROCEDURE — 77336 RADIATION PHYSICS CONSULT: CPT | Performed by: RADIOLOGY

## 2025-02-28 ENCOUNTER — APPOINTMENT (OUTPATIENT)
Dept: LAB | Facility: CLINIC | Age: 50
End: 2025-02-28
Payer: COMMERCIAL

## 2025-02-28 DIAGNOSIS — C50.511 MALIGNANT NEOPLASM OF LOWER-OUTER QUADRANT OF RIGHT BREAST OF FEMALE, ESTROGEN RECEPTOR POSITIVE (HCC): ICD-10-CM

## 2025-02-28 DIAGNOSIS — Z17.0 MALIGNANT NEOPLASM OF LOWER-OUTER QUADRANT OF RIGHT BREAST OF FEMALE, ESTROGEN RECEPTOR POSITIVE (HCC): ICD-10-CM

## 2025-02-28 LAB
ALBUMIN SERPL BCG-MCNC: 4.1 G/DL (ref 3.5–5)
ALP SERPL-CCNC: 55 U/L (ref 34–104)
ALT SERPL W P-5'-P-CCNC: 13 U/L (ref 7–52)
ANION GAP SERPL CALCULATED.3IONS-SCNC: 5 MMOL/L (ref 4–13)
AST SERPL W P-5'-P-CCNC: 17 U/L (ref 13–39)
BASOPHILS # BLD AUTO: 0.03 THOUSANDS/ÂΜL (ref 0–0.1)
BASOPHILS NFR BLD AUTO: 1 % (ref 0–1)
BILIRUB SERPL-MCNC: 0.6 MG/DL (ref 0.2–1)
BUN SERPL-MCNC: 14 MG/DL (ref 5–25)
CALCIUM SERPL-MCNC: 9.4 MG/DL (ref 8.4–10.2)
CHLORIDE SERPL-SCNC: 106 MMOL/L (ref 96–108)
CO2 SERPL-SCNC: 30 MMOL/L (ref 21–32)
CREAT SERPL-MCNC: 0.68 MG/DL (ref 0.6–1.3)
EOSINOPHIL # BLD AUTO: 0.32 THOUSAND/ÂΜL (ref 0–0.61)
EOSINOPHIL NFR BLD AUTO: 13 % (ref 0–6)
ERYTHROCYTE [DISTWIDTH] IN BLOOD BY AUTOMATED COUNT: 11.9 % (ref 11.6–15.1)
GFR SERPL CREATININE-BSD FRML MDRD: 104 ML/MIN/1.73SQ M
GLUCOSE SERPL-MCNC: 105 MG/DL (ref 65–140)
HCT VFR BLD AUTO: 43.5 % (ref 34.8–46.1)
HGB BLD-MCNC: 14.6 G/DL (ref 11.5–15.4)
IMM GRANULOCYTES # BLD AUTO: 0.01 THOUSAND/UL (ref 0–0.2)
IMM GRANULOCYTES NFR BLD AUTO: 0 % (ref 0–2)
LYMPHOCYTES # BLD AUTO: 0.21 THOUSANDS/ÂΜL (ref 0.6–4.47)
LYMPHOCYTES NFR BLD AUTO: 9 % (ref 14–44)
MCH RBC QN AUTO: 31.7 PG (ref 26.8–34.3)
MCHC RBC AUTO-ENTMCNC: 33.6 G/DL (ref 31.4–37.4)
MCV RBC AUTO: 95 FL (ref 82–98)
MONOCYTES # BLD AUTO: 0.24 THOUSAND/ÂΜL (ref 0.17–1.22)
MONOCYTES NFR BLD AUTO: 10 % (ref 4–12)
NEUTROPHILS # BLD AUTO: 1.67 THOUSANDS/ÂΜL (ref 1.85–7.62)
NEUTS SEG NFR BLD AUTO: 67 % (ref 43–75)
NRBC BLD AUTO-RTO: 0 /100 WBCS
PLATELET # BLD AUTO: 169 THOUSANDS/UL (ref 149–390)
PMV BLD AUTO: 10.2 FL (ref 8.9–12.7)
POTASSIUM SERPL-SCNC: 4.4 MMOL/L (ref 3.5–5.3)
PROT SERPL-MCNC: 6.7 G/DL (ref 6.4–8.4)
RBC # BLD AUTO: 4.6 MILLION/UL (ref 3.81–5.12)
SODIUM SERPL-SCNC: 141 MMOL/L (ref 135–147)
WBC # BLD AUTO: 2.48 THOUSAND/UL (ref 4.31–10.16)

## 2025-02-28 PROCEDURE — 36415 COLL VENOUS BLD VENIPUNCTURE: CPT

## 2025-02-28 PROCEDURE — 85025 COMPLETE CBC W/AUTO DIFF WBC: CPT

## 2025-02-28 PROCEDURE — 80053 COMPREHEN METABOLIC PANEL: CPT

## 2025-03-03 ENCOUNTER — OFFICE VISIT (OUTPATIENT)
Age: 50
End: 2025-03-03
Payer: COMMERCIAL

## 2025-03-03 DIAGNOSIS — Z48.89 ENCOUNTER FOR FOLLOW-UP CARE INVOLVING PLASTIC SURGERY: Primary | ICD-10-CM

## 2025-03-03 PROCEDURE — 99214 OFFICE O/P EST MOD 30 MIN: CPT | Performed by: PHYSICIAN ASSISTANT

## 2025-03-03 NOTE — PROGRESS NOTES
Patient Identification: Stacey Villatoro is a 49 y.o. female     History of Present Illness: The patient is a 49 y.o.  year-old female  who presents to the office for a follow up visit. Patient is 165 days s/p Bilateral first stage breast reconstruction and placement of submuscular [place] textured 750 mL Yoel tissue expanders, initial fill 100 mL, reinforcement of inferior pole with Flex HD acellular dermal matrix, bilateral pectoralis block with Exparel, right sided axillary advancement flap, 15 cm x 10 cm, placement of bilateral lori none DME negative pressure wound therapy dressings, less than 50 cm² each, total less than 50 cm² on 9/19/2024.    She completed 4 cycles of adjuvant chemotherapy with docetaxel and cyclophosphamide on 1/2/2025. Finished RT to right chest on 2/26/25. Pt is doing well today, denies wounds, fevers, chills, drainage, sign of infection or significant pain. Wearing bra.  She offers no complaints today. Desires implant based recon.     Total TE volume:   Right: 600/750 ml   Left: 600/750 ml    Past Medical History:   Diagnosis Date    Arthritis     BRCA1 negative     BRCA2 negative     Breast cancer (HCC)       Patient Active Problem List   Diagnosis    Bilateral primary osteoarthritis of knee    BMI 30.0-30.9,adult    Malignant neoplasm of lower-outer quadrant of right breast of female, estrogen receptor positive (HCC)    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)    Status post bilateral breast reconstruction    Status post bilateral mastectomy    Chemotherapy induced neutropenia (HCC)       Current Outpatient Medications:     Abemaciclib (Verzenio) 150 MG TABS, Take 150 mg by mouth 2 (two) times a day, Disp: 56 tablet, Rfl: 5    anastrozole (ARIMIDEX) 1 mg tablet, Take 1 tablet (1 mg total) by mouth daily, Disp: 30 tablet, Rfl: 11    cyclobenzaprine (FLEXERIL) 10 mg tablet, Take 1 tablet (10 mg total) by mouth 3 (three) times a day as needed for muscle spasms,  Disp: 30 tablet, Rfl: 0    dexamethasone (DECADRON) 4 mg tablet, Take 2 tablets (8 mg total) by mouth 2 (two) times a day with meals START DAY BEFORE CHEMO DAY OF CHEMO AND DAY AFTER CHEMO EVERY 3 WEEKS FOR FOUR CYCLES, Disp: 12 tablet, Rfl: 3    loratadine (CLARITIN) 10 mg tablet, Take 10 mg by mouth as needed for allergies, Disp: , Rfl:     ondansetron (ZOFRAN) 8 mg tablet, Take 1 tablet (8 mg total) by mouth every 8 (eight) hours as needed for nausea or vomiting, Disp: 20 tablet, Rfl: 3    oxyCODONE (Roxicodone) 5 immediate release tablet, Take 1 tablet (5 mg total) by mouth every 6 (six) hours as needed for severe pain Max Daily Amount: 20 mg, Disp: 10 tablet, Rfl: 0    enoxaparin (Lovenox) 40 mg/0.4 mL, Inject 0.4 mL (40 mg total) under the skin in the morning for 7 days, Disp: 2.8 mL, Rfl: 0    gabapentin (Neurontin) 300 mg capsule, Take 1 capsule (300 mg total) by mouth 3 (three) times a day for 7 days, Disp: 21 capsule, Rfl: 0    Past Surgical History:   Procedure Laterality Date    BREAST BIOPSY Right 2024     SECTION      x2    FOOT SURGERY      MAMMO NEEDLE LOCALIZATION LEFT (ALL INC) Left 9/3/2024    MRI BREAST BIOPSY LEFT (ALL INCLUSIVE) Left 2024    MRI BREAST BIOPSY RIGHT (ALL INCLUSIVE) Right 2024    SD MAST MODF RAD W/AX LYMPH NOD W/WO PECT/AMALIA MIN Right 2024    Procedure: RIGHT BREAST MODIFIED RADICAL MASTECTOMY LEVEL I AND II LYMPH NODE DISSECTION;  Surgeon: Mert Brink MD;  Location: [facility];  Service: Surgical Oncology    SD MASTECTOMY SIMPLE COMPLETE Left 2024    Procedure: LEFT MASTECTOMY, SAMY CLIPS IN THE RIGHT BREAST/AXILLARY LYMPH NODE AND LEFT BREAST;  Surgeon: Mert Brink MD;  Location: [facility];  Service: Surgical Oncology    SD TISSUE EXPANDER PLACEMENT BREAST RECONSTRUCTION Bilateral 2024    Procedure: FIRST STAGE BILATERAL BREAST RECONSTRUCTION WITH TISSUE EXPANDERS AND ADM;  Surgeon: Jonathan Archibald MD;   Location: [facility];  Service: Plastics    US GUIDED BREAST BIOPSY RIGHT COMPLETE Right 7/11/2024    US GUIDED BREAST LYMPH NODE BIOPSY RIGHT Right 7/11/2024     Social History     Tobacco Use    Smoking status: Never     Passive exposure: Never    Smokeless tobacco: Never   Substance Use Topics    Alcohol use: Not Currently     Comment: Very rarely, 1 or 2 beers if i do.     There were no vitals filed for this visit.      Physical Exam  General: NAD, alert  Breasts: Bilateral incisions are clean, dry, and intact. There is no evidence of hematoma or seroma formation.  There is no surrounding erythema, wound breakdown, drainage, or signs of infection. Expected radiation changes to right chest wall.       Assessment and Plan: 49 y.o.  year-old female s/p Right Breast Modified Radical Mastectomy Level I And Ii Lymph Node Dissection - Right, Left Mastectomy, Sheron Clips In The Right Breast/axillary Lymph Node And Left Breast - Left, and First Stage Bilateral Breast Reconstruction With Tissue Expanders And Adm - Bilateral       -Recommend vaseline/aquaphor to breast incisions daily.   -Patient not interested in PT at this time.  -Discussed patient with Dr. Archibald.  Patient's expansions are not complete, radiation simulation was completed prior to the completion of her expansions.  We will allow her to heal from radiation for about 2 months, then restart tissue expansions.  The patient is to return in 1 week for additional fills.  - Pt still desires implant based reconstruction - she is aware we have to wait 6-12 months from the end of radiation before next surgery.  -The patient is to call the office with any questions or concerns. All of the patient's questions were answered at this time and they agree with the plan of care.      I have spent a total time of 30 minutes in caring for this patient on the day of the visit/encounter including Risks and benefits of tx options, Instructions for management, Patient and family  education, Impressions, Counseling / Coordination of care, Documenting in the medical record, Obtaining or reviewing history  , and Communicating with other healthcare professionals .      Casey Taylor PA-C  Plastic & Reconstructive Surgery

## 2025-03-10 ENCOUNTER — HOSPITAL ENCOUNTER (OUTPATIENT)
Dept: INFUSION CENTER | Facility: CLINIC | Age: 50
Discharge: HOME | End: 2025-03-10
Payer: COMMERCIAL

## 2025-03-10 DIAGNOSIS — Z17.0 MALIGNANT NEOPLASM OF LOWER-OUTER QUADRANT OF RIGHT BREAST OF FEMALE, ESTROGEN RECEPTOR POSITIVE (HCC): Primary | ICD-10-CM

## 2025-03-10 DIAGNOSIS — C50.511 MALIGNANT NEOPLASM OF LOWER-OUTER QUADRANT OF RIGHT BREAST OF FEMALE, ESTROGEN RECEPTOR POSITIVE (HCC): Primary | ICD-10-CM

## 2025-03-10 PROCEDURE — 96402 CHEMO HORMON ANTINEOPL SQ/IM: CPT

## 2025-03-10 RX ADMIN — LEUPROLIDE ACETATE 7.5 MG: KIT at 09:03

## 2025-03-10 NOTE — PROGRESS NOTES
Pt presents for lupron injection offering no complaints. Pt tolerated treatment without incident, injection administered in the right ventrogluteal. AVS declined. Next appointment on 4/7/25 at 9am.

## 2025-03-18 DIAGNOSIS — C50.511 MALIGNANT NEOPLASM OF LOWER-OUTER QUADRANT OF RIGHT BREAST OF FEMALE, ESTROGEN RECEPTOR POSITIVE (HCC): Primary | ICD-10-CM

## 2025-03-18 DIAGNOSIS — E83.42 HYPOMAGNESEMIA: ICD-10-CM

## 2025-03-18 DIAGNOSIS — Z17.0 MALIGNANT NEOPLASM OF LOWER-OUTER QUADRANT OF RIGHT BREAST OF FEMALE, ESTROGEN RECEPTOR POSITIVE (HCC): Primary | ICD-10-CM

## 2025-03-20 ENCOUNTER — TELEPHONE (OUTPATIENT)
Age: 50
End: 2025-03-20

## 2025-03-20 DIAGNOSIS — C50.511 MALIGNANT NEOPLASM OF LOWER-OUTER QUADRANT OF RIGHT BREAST OF FEMALE, ESTROGEN RECEPTOR POSITIVE (HCC): Primary | ICD-10-CM

## 2025-03-20 DIAGNOSIS — Z17.0 MALIGNANT NEOPLASM OF LOWER-OUTER QUADRANT OF RIGHT BREAST OF FEMALE, ESTROGEN RECEPTOR POSITIVE (HCC): Primary | ICD-10-CM

## 2025-03-20 NOTE — TELEPHONE ENCOUNTER
Reviewed with Dr. Hyman.  Recommending stopping Verzenio for a few days to allow diarrhea to resolve and then start reduced dose.    Phoned patient with above information.  Patient aware and agreeable.

## 2025-03-20 NOTE — TELEPHONE ENCOUNTER
Spoke with patient who was advised by Dr. Hyman to decrease Verzenio dose to 100mg BID and new script was sent into [facility] specialty pharmacy. She is calling them now to set up shipment for new script but would like to know what dose she should take until her new script arrived. She currently has 150mg tablets, not scored. I let her know we will review with Dr. Hyman and patient is ok with [employer] message response back.

## 2025-03-21 ENCOUNTER — APPOINTMENT (OUTPATIENT)
Dept: LAB | Facility: CLINIC | Age: 50
End: 2025-03-21
Payer: COMMERCIAL

## 2025-03-21 DIAGNOSIS — C50.511 MALIGNANT NEOPLASM OF LOWER-OUTER QUADRANT OF RIGHT BREAST OF FEMALE, ESTROGEN RECEPTOR POSITIVE (HCC): ICD-10-CM

## 2025-03-21 DIAGNOSIS — C50.912 INVASIVE DUCTAL CARCINOMA OF BREAST, FEMALE, LEFT (HCC): ICD-10-CM

## 2025-03-21 DIAGNOSIS — Z17.0 MALIGNANT NEOPLASM OF LOWER-OUTER QUADRANT OF RIGHT BREAST OF FEMALE, ESTROGEN RECEPTOR POSITIVE (HCC): ICD-10-CM

## 2025-03-21 LAB
25(OH)D3 SERPL-MCNC: 20.6 NG/ML (ref 30–100)
ALBUMIN SERPL BCG-MCNC: 4 G/DL (ref 3.5–5)
ALP SERPL-CCNC: 63 U/L (ref 34–104)
ALT SERPL W P-5'-P-CCNC: 11 U/L (ref 7–52)
ANION GAP SERPL CALCULATED.3IONS-SCNC: 8 MMOL/L (ref 4–13)
ANISOCYTOSIS BLD QL SMEAR: PRESENT
AST SERPL W P-5'-P-CCNC: 15 U/L (ref 13–39)
BASOPHILS # BLD AUTO: 0.02 THOUSANDS/ÂΜL (ref 0–0.1)
BASOPHILS NFR BLD AUTO: 1 % (ref 0–1)
BILIRUB SERPL-MCNC: 0.49 MG/DL (ref 0.2–1)
BUN SERPL-MCNC: 18 MG/DL (ref 5–25)
CALCIUM SERPL-MCNC: 9.2 MG/DL (ref 8.4–10.2)
CHLORIDE SERPL-SCNC: 107 MMOL/L (ref 96–108)
CO2 SERPL-SCNC: 27 MMOL/L (ref 21–32)
CREAT SERPL-MCNC: 0.92 MG/DL (ref 0.6–1.3)
EOSINOPHIL # BLD AUTO: 0.1 THOUSAND/ÂΜL (ref 0–0.61)
EOSINOPHIL NFR BLD AUTO: 7 % (ref 0–6)
ERYTHROCYTE [DISTWIDTH] IN BLOOD BY AUTOMATED COUNT: 11.1 % (ref 11.6–15.1)
GFR SERPL CREATININE-BSD FRML MDRD: 73 ML/MIN/1.73SQ M
GLUCOSE SERPL-MCNC: 89 MG/DL (ref 65–140)
HCT VFR BLD AUTO: 38.8 % (ref 34.8–46.1)
HGB BLD-MCNC: 13.6 G/DL (ref 11.5–15.4)
IMM GRANULOCYTES # BLD AUTO: 0.01 THOUSAND/UL (ref 0–0.2)
IMM GRANULOCYTES NFR BLD AUTO: 1 % (ref 0–2)
LYMPHOCYTES # BLD AUTO: 0.33 THOUSANDS/ÂΜL (ref 0.6–4.47)
LYMPHOCYTES NFR BLD AUTO: 22 % (ref 14–44)
MACROCYTES BLD QL AUTO: PRESENT
MAGNESIUM SERPL-MCNC: 1.8 MG/DL (ref 1.9–2.7)
MCH RBC QN AUTO: 31.2 PG (ref 26.8–34.3)
MCHC RBC AUTO-ENTMCNC: 35.1 G/DL (ref 31.4–37.4)
MCV RBC AUTO: 89 FL (ref 82–98)
MONOCYTES # BLD AUTO: 0.1 THOUSAND/ÂΜL (ref 0.17–1.22)
MONOCYTES NFR BLD AUTO: 7 % (ref 4–12)
NEUTROPHILS # BLD AUTO: 0.92 THOUSANDS/ÂΜL (ref 1.85–7.62)
NEUTS SEG NFR BLD AUTO: 62 % (ref 43–75)
NRBC BLD AUTO-RTO: 0 /100 WBCS
PLATELET # BLD AUTO: 89 THOUSANDS/UL (ref 149–390)
PLATELET BLD QL SMEAR: ABNORMAL
PMV BLD AUTO: 10.7 FL (ref 8.9–12.7)
POTASSIUM SERPL-SCNC: 3.4 MMOL/L (ref 3.5–5.3)
PROT SERPL-MCNC: 6 G/DL (ref 6.4–8.4)
RBC # BLD AUTO: 4.36 MILLION/UL (ref 3.81–5.12)
RBC MORPH BLD: PRESENT
SODIUM SERPL-SCNC: 142 MMOL/L (ref 135–147)
WBC # BLD AUTO: 1.48 THOUSAND/UL (ref 4.31–10.16)

## 2025-03-21 PROCEDURE — 80053 COMPREHEN METABOLIC PANEL: CPT

## 2025-03-21 PROCEDURE — 82306 VITAMIN D 25 HYDROXY: CPT

## 2025-03-21 PROCEDURE — 36415 COLL VENOUS BLD VENIPUNCTURE: CPT

## 2025-03-21 PROCEDURE — 83735 ASSAY OF MAGNESIUM: CPT

## 2025-03-21 PROCEDURE — 85025 COMPLETE CBC W/AUTO DIFF WBC: CPT

## 2025-03-21 NOTE — TELEPHONE ENCOUNTER
Patient states she spoke to [facility] that they still have not received the prescription for the Verzenio 100 mg tablets. I stated that the order states it was received by the pharmacy. She requests that we please call the pharmacy to clarify.    Called [facility] to clarify and spoke to Dariela. She states that they did receive the updated script for Verzenio 100 mg BID and that the script just needs to be triaged and sent to their mail order location. She states it will take about an hour or so for them to get the order and then they will call the patient to schedule the delivery of the new script.     [employer] message sent to patient to make her aware.

## 2025-03-24 DIAGNOSIS — C50.911 CARCINOMA OF RIGHT BREAST METASTATIC TO AXILLARY LYMPH NODE (HCC): Primary | ICD-10-CM

## 2025-03-24 DIAGNOSIS — C77.3 CARCINOMA OF RIGHT BREAST METASTATIC TO AXILLARY LYMPH NODE (HCC): Primary | ICD-10-CM

## 2025-03-26 ENCOUNTER — OFFICE VISIT (OUTPATIENT)
Dept: RADIATION ONCOLOGY | Facility: CLINIC | Age: 50
End: 2025-03-26
Attending: RADIOLOGY
Payer: COMMERCIAL

## 2025-03-26 VITALS
RESPIRATION RATE: 16 BRPM | HEART RATE: 76 BPM | WEIGHT: 160 LBS | BODY MASS INDEX: 27.46 KG/M2 | TEMPERATURE: 97.6 F | OXYGEN SATURATION: 99 % | DIASTOLIC BLOOD PRESSURE: 80 MMHG | SYSTOLIC BLOOD PRESSURE: 128 MMHG

## 2025-03-26 DIAGNOSIS — Z17.0 MALIGNANT NEOPLASM OF LOWER-OUTER QUADRANT OF RIGHT BREAST OF FEMALE, ESTROGEN RECEPTOR POSITIVE (HCC): Primary | ICD-10-CM

## 2025-03-26 DIAGNOSIS — C50.511 MALIGNANT NEOPLASM OF LOWER-OUTER QUADRANT OF RIGHT BREAST OF FEMALE, ESTROGEN RECEPTOR POSITIVE (HCC): Primary | ICD-10-CM

## 2025-03-26 PROCEDURE — 99024 POSTOP FOLLOW-UP VISIT: CPT | Performed by: RADIOLOGY

## 2025-03-26 PROCEDURE — 99211 OFF/OP EST MAY X REQ PHY/QHP: CPT | Performed by: RADIOLOGY

## 2025-03-26 PROCEDURE — G0463 HOSPITAL OUTPT CLINIC VISIT: HCPCS | Performed by: RADIOLOGY

## 2025-03-26 NOTE — ASSESSMENT & PLAN NOTE
Stacey Villatoro 1975 is a 50 y.o. female with a history of stage IIIA ([id number]) right breast cancer status post bilateral mastectomies with axillary dissection on the right with tissue expander placement bilaterally.  She had 5/12 axillary lymph node metastases with extranodal extension.  Tumor cells were ER/OK(+) and HER 2(-).   She completed adjuvant radiation to the right chest wall and regional lymph nodes on 2/26/25. She is maintained on anastrozole and Lupron.       Clinically SHAY  -Maintained on Lupron and anastrazole under care of Medical Oncology.    -Evaluation for restarting Verzenio with Medical Oncology.  -Compliant with Surgical Oncology follow-up.  -Continues reconstructions with Plastics.  -No lymphedema noted and good range of motion.  Instructed to seek evaluation with oncology team if she develop upper extremity edema or more restriction of motion as healing continues.  Bernarda barker.  -Follow-up 6 months.    Post radiation  -Patient healing well from radiation.    -Instructed to practice sun protection to irradiated skin.  Recommended daily emollient use to right chest wall daily for at least 6 months.

## 2025-03-26 NOTE — PROGRESS NOTES
Stacey Villatoro 1975 is a 50 y.o. female with a history of stage IIIA ([id number]), prognostic stage IB of the right breast status post bilateral mastectomies with axillary dissection with tissue expander placement.  She had 5/12 axillary lymph node metastases with extranodal extension.  Endocrine therapy planned.  She completed adjuvant radiation to the right chest wall and regional lymph nodes on 25. She returns today for follow up.    She tolerated radiation well with G2+ skin toxicity managed aggressively with emollients to good effect.  She suffered no other significant side effects due to radiation.       3/3/25 Plastic surgeryClaudia  Total TE volume:   Right: 600/750 ml   Left: 600/750 ml  Recommend vaseline/aquaphor to breast incisions daily.   -Patient not interested in PT at this time.  -Discussed patient with Dr. Archibald.  Patient's expansions are not complete, radiation simulation was completed prior to the completion of her expansions.  We will allow her to heal from radiation for about 2 months, then restart tissue expansions.   Pt still desires implant based reconstruction - she is aware we have to wait 6-12 months from the end of radiation before next surgery.  -The patient is to call the office with any questions or concerns.         Upcomin25 Dr. Hyman  25 Plastic surgery- tissue expander fill  25 Dr. Brink    Follow up visit     Oncology History   Malignant neoplasm of lower-outer quadrant of right breast of female, estrogen receptor positive (HCC)   2024 Biopsy    Right breast ultrasound guided biopsy  A. 7 o'clock 7 cm from nipple  Invasive breast carcinoma of no special type (ductal NST/invasive ductal carcinoma)   Grade 2  ER 95, VT 61, HER2 1+  No lymphovascular invasion    B. Right axillary lymph node  Invasive ductal carcinoma of mammary origin, involving fibroadipose tissue, see comment.   Lymphoid tissue is not identified.    C. Right axillary lymph  node  Invasive ductal carcinoma of mammary origin, involving fibroadipose tissue, see comment.   Lymphoid tissue is not identified.    Right malignancy appears unifocal. The biopsy-proven carcinoma measured 0.6 cm on ultrasound. There is metastatic disease to a right axillary lymph node.  A second abnormal appearing lymph node is noted in the right axilla (not biopsied). 6/18/2024 left breast mammo negative.     7/18/2024 Genetic Testing    NEGATIVE: No Clinically Significant Variants Detected  [place] - A total of 59 genes were evaluated with RNA insight: APC, RICKY, BAP1, BARD1, BMPR1A, BRCA1, BRCA2, BRIP1, CDH1, CDK4, CDKN1B, CDKN2A, CHEK2, DICER1, FH, FLCN, KIF1B, MAX, MEN1, MET, MLH1, MSH2, MSH6, MUTYH, NF1, NTHL1, PALB2, PMS2, POT1, PTEN, RAD51C, RAD51D, RB1, RET, SDHA, SDHAF2, SDHB, SDHC, SDHD, SMAD4, SMARCA4, STK11, FSQU220, TP53, TSC1, TSC2 and VHL (sequencing and deletion/duplication); AXIN2, CTNNA1, EGLN1, HOXB13, KIT, MITF, MSH3, PDGFRA, POLD1 and POLE (sequencing only); EPCAM and GREM1 (deletion/duplication only).     7/19/2024 Observation    Bilateral Breast MRI IMPRESSION:  Continued surgical and oncologic management is recommended for the biopsy proven malignancy of the right breast corresponding to a 3.4 cm mass at 7:00 in the right breast and the biopsy proven metastatic right axillary lymph node. There are approximately 4 enlarged right level 1 lymph nodes. No internal mammary adenopathy.      Indeterminate mass at 12:00 and non-mass enhancement at 9:00 in the right breast. If patient is pursuing breast conservation therapy, two site MRI guided biopsy of the right breast is recommended.      Indeterminate non-mass enhancement at 7:00 in the left breast. MRI guided biopsy is recommended.      Of note, bilateral biopsies cannot be preformed on the same day as the left side will need a medial approach.      8/9/2024 Biopsy    Left breast MRI-guided biopsy  7 o'clock  Lobular neoplasia (ALH and  LCIS)    Concordant. Area of non-mass enhancement measured 8 mm on MRI.     8/16/2024 Biopsy    Right breast MRI-guided biopsy  A. 12 o'clock  Unremarkable ductules and lobules in a background of dense stromal fibrosis  Focal changes suggestive for prior biopsy  Negative for atypia or carcinoma    B. 10 o'clock  Focus of columnar cell change and columnar cell hyperplasia with calcifications  Negative for atypia or carcinoma    Concordant. Continued surgical management for known ipsilateral breast carcinoma recommended.     9/19/2024 Surgery    Right modified radical mastectomy left simple mastectomy immediate reconstruction with Dr. Archibadl  RIGHT  Invasive breast carcinoma of no special type (ductal NST / invasive ductal carcinoma)   34 mm  Separate tumor nodule, 11 mm, most compatible with completely replaced lymph node   Grade 2   Invasive carcinoma less than 1 mm from posterior margin   Subdermal invasion by tumor. Perineural invasion by tumor  5/12 Lymph nodes; positive for extranodal extension  Anatomic stage: at least stage IIIA  Prognostic stage: IB    LEFT  Breast tissue with non-invasive lobular neoplasia (ALH / LCIS) and atypical ductal hyperplasia (ADH)      9/19/2024 -  Cancer Staged    Staging form: Breast, AJCC 8th Edition  - Pathologic stage from 9/19/2024: Stage IB (pT2, pN2a, cM0, G2, ER+, NC+, HER2-, Oncotype DX score: 20) - Signed by Ros Hyman MD on 9/26/2024  Stage prefix: Initial diagnosis  Multigene prognostic tests performed: Oncotype DX  Recurrence score range: Greater than or equal to 11  Histologic grading system: 3 grade system       10/31/2024 -  Chemotherapy    alteplase (CATHFLO), 2 mg, Intracatheter, Every 1 Minute as needed, 4 of 4 cycles  pegfilgrastim (NEULASTA ONPRO), 6 mg, Subcutaneous, Once, 4 of 4 cycles  Administration: 6 mg (10/31/2024), 6 mg (11/21/2024), 6 mg (12/12/2024)  cyclophosphamide (CYTOXAN) IVPB, 1,020 mg, Intravenous, Once, 4 of 4 cycles  Administration: 1,000  mg (10/31/2024), 1,000 mg (11/21/2024), 1,000 mg (12/12/2024)  DOCEtaxel (TAXOTERE) chemo infusion, 75 mg/m2 = 127.6 mg, Intravenous, Once, 4 of 4 cycles  Administration: 127.6 mg (10/31/2024), 127.6 mg (11/21/2024), 127.6 mg (12/12/2024)     1/22/2025 - 2/26/2025 Radiation    Treatments:  Course: C1  Plan ID Energy Fractions Dose per Fraction (cGy) Dose Correction (cGy) Total Dose Delivered (cGy) Elapsed Days   RtCW_CNI 10X/6X 25 / 25 200 0 5,000 35    Treatment Dates:  1/22/2025 - 2/26/2025.      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 Archibald  RIGHT  Invasive breast carcinoma of no special type (ductal NST / invasive ductal carcinoma)   34 mm  Separate tumor nodule, 11 mm, most compatible with completely replaced lymph node   Grade 2   Invasive carcinoma less than 1 mm from posterior margin   Subdermal invasion by tumor. Perineural invasion by tumor  5/12 Lymph nodes; positive for extranodal extension  Anatomic stage: at least stage IIIA  Prognostic stage: IB    LEFT  Breast tissue with non-invasive lobular neoplasia (ALH / LCIS) and atypical ductal hyperplasia (ADH)      1/22/2025 - 2/26/2025 Radiation    Treatments:  Course: C1  Plan ID Energy Fractions Dose per Fraction (cGy) Dose Correction (cGy) Total Dose Delivered (cGy) Elapsed Days   RtCW_CNI 10X/6X 25 / 25 200 0 5,000 35    Treatment Dates:  1/22/2025 - 2/26/2025.          Review of Systems:  Review of Systems   Constitutional:  Positive for fatigue.   Musculoskeletal:         Good ROM both arms   Skin:  Negative for color change and rash.     Health Maintenance   Topic Date Due    BMI: Followup Plan  Never done    Zoster Vaccine (1 of 2) Never done    Pneumococcal Vaccine: Pediatrics (0 to 5 Years) and At-Risk Patients (6 to 64 Years) (1 of 2 - PCV) Never done    Cervical Cancer Screening  10/03/2024    Annual  Physical  2025    COVID-19 Vaccine (7 - Pfizer risk - season) 2025    Depression Screening  10/23/2025    BMI: Adult  2026    Colorectal Cancer Screening  2027    DTaP,Tdap,and Td Vaccines (2 - Td or Tdap) 10/03/2029    HIV Screening  Completed    Hepatitis C Screening  Completed    Influenza Vaccine  Completed    Meningococcal B Vaccine  Aged Out    RSV Vaccine age 0-20 Months  Aged Out    HIB Vaccine  Aged Out    IPV Vaccine  Aged Out    Hepatitis A Vaccine  Aged Out    Meningococcal ACWY Vaccine  Aged Out    HPV Vaccine  Aged Out     Patient Active Problem List   Diagnosis    Bilateral primary osteoarthritis of knee    BMI 30.0-30.9,adult    Malignant neoplasm of lower-outer quadrant of right breast of female, estrogen receptor positive (HCC)    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)    Status post bilateral breast reconstruction    Status post bilateral mastectomy    Chemotherapy induced neutropenia (HCC)     Past Medical History:   Diagnosis Date    Arthritis     BRCA1 negative     BRCA2 negative     Breast cancer (HCC)      Past Surgical History:   Procedure Laterality Date    BREAST BIOPSY Right 2024     SECTION      x2    FOOT SURGERY      MAMMO NEEDLE LOCALIZATION LEFT (ALL INC) Left 9/3/2024    MRI BREAST BIOPSY LEFT (ALL INCLUSIVE) Left 2024    MRI BREAST BIOPSY RIGHT (ALL INCLUSIVE) Right 2024    FL MAST MODF RAD W/AX LYMPH NOD W/WO PECT/AMALIA MIN Right 2024    Procedure: RIGHT BREAST MODIFIED RADICAL MASTECTOMY LEVEL I AND II LYMPH NODE DISSECTION;  Surgeon: Mert Brink MD;  Location: [facility];  Service: Surgical Oncology    FL MASTECTOMY SIMPLE COMPLETE Left 2024    Procedure: LEFT MASTECTOMY, SAMY CLIPS IN THE RIGHT BREAST/AXILLARY LYMPH NODE AND LEFT BREAST;  Surgeon: Mert Brink MD;  Location: [facility];  Service: Surgical Oncology    FL TISSUE EXPANDER PLACEMENT BREAST  RECONSTRUCTION Bilateral 9/19/2024    Procedure: FIRST STAGE BILATERAL BREAST RECONSTRUCTION WITH TISSUE EXPANDERS AND ADM;  Surgeon: Jonathan Archibald MD;  Location: [facility];  Service: Plastics    US GUIDED BREAST BIOPSY RIGHT COMPLETE Right 7/11/2024    US GUIDED BREAST LYMPH NODE BIOPSY RIGHT Right 7/11/2024     Family History   Problem Relation Age of Onset    Dementia Mother     Lung cancer Mother     Heart disease Father     Skin cancer Father     Heart attack Father         AT THE AGE OF 60    No Known Problems Maternal Grandmother     No Known Problems Maternal Grandfather     No Known Problems Paternal Grandmother     No Known Problems Paternal Grandfather     No Known Problems Daughter     No Known Problems Son      Social History     Socioeconomic History    Marital status: /Civil Union     Spouse name: Not on file    Number of children: Not on file    Years of education: Not on file    Highest education level: Not on file   Occupational History    Not on file   Tobacco Use    Smoking status: Never     Passive exposure: Never    Smokeless tobacco: Never   Vaping Use    Vaping status: Never Used   Substance and Sexual Activity    Alcohol use: Not Currently     Comment: Very rarely, 1 or 2 beers if i do.    Drug use: No    Sexual activity: Yes     Partners: Male     Comment: Tubes tied 16 years ago   Other Topics Concern    Not on file   Social History Narrative    Lives with  and kids     Work for [employer]      Social Drivers of Health     Financial Resource Strain: Not on file   Food Insecurity: Not on file   Transportation Needs: Not on file   Physical Activity: Not on file   Stress: Not on file   Social Connections: Not on file   Intimate Partner Violence: Not on file   Housing Stability: Not on file       Current Outpatient Medications:     Abemaciclib (Verzenio) 100 MG TABS, Take 100 mg by mouth 2 (two) times a day, Disp: 56 tablet, Rfl: 5    anastrozole (ARIMIDEX) 1 mg tablet, Take  1 tablet (1 mg total) by mouth daily, Disp: 30 tablet, Rfl: 11    cyclobenzaprine (FLEXERIL) 10 mg tablet, Take 1 tablet (10 mg total) by mouth 3 (three) times a day as needed for muscle spasms, Disp: 30 tablet, Rfl: 0    dexamethasone (DECADRON) 4 mg tablet, Take 2 tablets (8 mg total) by mouth 2 (two) times a day with meals START DAY BEFORE CHEMO DAY OF CHEMO AND DAY AFTER CHEMO EVERY 3 WEEKS FOR FOUR CYCLES, Disp: 12 tablet, Rfl: 3    enoxaparin (Lovenox) 40 mg/0.4 mL, Inject 0.4 mL (40 mg total) under the skin in the morning for 7 days, Disp: 2.8 mL, Rfl: 0    gabapentin (Neurontin) 300 mg capsule, Take 1 capsule (300 mg total) by mouth 3 (three) times a day for 7 days, Disp: 21 capsule, Rfl: 0    loratadine (CLARITIN) 10 mg tablet, Take 10 mg by mouth as needed for allergies, Disp: , Rfl:     ondansetron (ZOFRAN) 8 mg tablet, Take 1 tablet (8 mg total) by mouth every 8 (eight) hours as needed for nausea or vomiting, Disp: 20 tablet, Rfl: 3    oxyCODONE (Roxicodone) 5 immediate release tablet, Take 1 tablet (5 mg total) by mouth every 6 (six) hours as needed for severe pain Max Daily Amount: 20 mg, Disp: 10 tablet, Rfl: 0  No Known Allergies  There were no vitals filed for this visit.

## 2025-03-26 NOTE — PROGRESS NOTES
Follow-up Visit   Name: Stacey Villatoro      : 1975      MRN: [id number]  Encounter Provider: Susana Duarte MD  Encounter Date: 3/26/2025   Encounter department: [facility] RADIATION ONCOLOGY  :  Assessment & Plan  Malignant neoplasm of lower-outer quadrant of right breast of female, estrogen receptor positive (HCC)  Stacey Villatoro 1975 is a 50 y.o. female with a history of stage IIIA ([id number]) right breast cancer status post bilateral mastectomies with axillary dissection on the right with tissue expander placement bilaterally.  She had 5/12 axillary lymph node metastases with extranodal extension.  Tumor cells were ER/ME(+) and HER 2(-).   She completed adjuvant radiation to the right chest wall and regional lymph nodes on 25. She is maintained on anastrozole and Lupron.       Clinically SHAY  -Maintained on Lupron and anastrazole under care of Medical Oncology.    -Evaluation for restarting Verzenio with Medical Oncology.  -Compliant with Surgical Oncology follow-up.  -Continues reconstructions with Plastics.  -No lymphedema noted and good range of motion.  Instructed to seek evaluation with oncology team if she develop upper extremity edema or more restriction of motion as healing continues.  Bernarda barker.  -Follow-up 6 months.    Post radiation  -Patient healing well from radiation.    -Instructed to practice sun protection to irradiated skin.  Recommended daily emollient use to right chest wall daily for at least 6 months.         History of Present Illness   Chief Complaint   Patient presents with    Breast Cancer    Follow-up   Pertinent Medical History   Stacey Villatoro 1975 is a 50 y.o. female with a history of stage IIIA ([id number]), prognostic stage IB invasive ductal carcinoma of the right breast status post bilateral mastectomies with axillary dissection with tissue expander placement.  She had 5/12 axillary lymph node metastases with extranodal extension.  Tumor  cells were ER/WY(+) and HER 2(-).   She completed adjuvant radiation to the right chest wall and regional lymph nodes on 25. She is maintained on anastrozole and Lupron.  She initiated Verzenio, but halted due to toxicity.  She is being considered for rechallenge with Dr. Hyman. She returns today for follow up.     3/3/25 Plastic surgery, Claudia follow-up  She is planned for the patient will continue expansions in about 2 months and then eventually proceed to implant based reconstruction in 6-12 months.  Total TE volume:   Right: 600/750 ml   Left: 600/750 ml    Patient has no breast related complaints. She denies pain or tenderness chest wall bilaterally.  She feels her skin has healed well.  She denies palpable nodules or suspicious skin changes.  She denies upper extremity edema or shoulder restriction.    Upcomin25 Dr. Hyman  25 Plastic surgery- tissue expander fill  25 Dr. Brink       Oncology History   Cancer Staging   Malignant neoplasm of lower-outer quadrant of right breast of female, estrogen receptor positive (HCC)  Staging form: Breast, AJCC 8th Edition  - Pathologic stage from 2024: Stage IB (pT2, pN2a, cM0, G2, ER+, WY+, HER2-, Oncotype DX score: 20) - Signed by Ros Hyman MD on 2024  Stage prefix: Initial diagnosis  Multigene prognostic tests performed: Oncotype DX  Recurrence score range: Greater than or equal to 11  Histologic grading system: 3 grade system  Oncology History   Malignant neoplasm of lower-outer quadrant of right breast of female, estrogen receptor positive (HCC)   2024 Biopsy    Right breast ultrasound guided biopsy  A. 7 o'clock 7 cm from nipple  Invasive breast carcinoma of no special type (ductal NST/invasive ductal carcinoma)   Grade 2  ER 95, WY 61, HER2 1+  No lymphovascular invasion    B. Right axillary lymph node  Invasive ductal carcinoma of mammary origin, involving fibroadipose tissue, see comment.   Lymphoid tissue is not  identified.    C. Right axillary lymph node  Invasive ductal carcinoma of mammary origin, involving fibroadipose tissue, see comment.   Lymphoid tissue is not identified.    Right malignancy appears unifocal. The biopsy-proven carcinoma measured 0.6 cm on ultrasound. There is metastatic disease to a right axillary lymph node.  A second abnormal appearing lymph node is noted in the right axilla (not biopsied). 6/18/2024 left breast mammo negative.     7/18/2024 Genetic Testing    NEGATIVE: No Clinically Significant Variants Detected  [place] - A total of 59 genes were evaluated with RNA insight: APC, IRCKY, BAP1, BARD1, BMPR1A, BRCA1, BRCA2, BRIP1, CDH1, CDK4, CDKN1B, CDKN2A, CHEK2, DICER1, FH, FLCN, KIF1B, MAX, MEN1, MET, MLH1, MSH2, MSH6, MUTYH, NF1, NTHL1, PALB2, PMS2, POT1, PTEN, RAD51C, RAD51D, RB1, RET, SDHA, SDHAF2, SDHB, SDHC, SDHD, SMAD4, SMARCA4, STK11, AXVY583, TP53, TSC1, TSC2 and VHL (sequencing and deletion/duplication); AXIN2, CTNNA1, EGLN1, HOXB13, KIT, MITF, MSH3, PDGFRA, POLD1 and POLE (sequencing only); EPCAM and GREM1 (deletion/duplication only).     7/19/2024 Observation    Bilateral Breast MRI IMPRESSION:  Continued surgical and oncologic management is recommended for the biopsy proven malignancy of the right breast corresponding to a 3.4 cm mass at 7:00 in the right breast and the biopsy proven metastatic right axillary lymph node. There are approximately 4 enlarged right level 1 lymph nodes. No internal mammary adenopathy.      Indeterminate mass at 12:00 and non-mass enhancement at 9:00 in the right breast. If patient is pursuing breast conservation therapy, two site MRI guided biopsy of the right breast is recommended.      Indeterminate non-mass enhancement at 7:00 in the left breast. MRI guided biopsy is recommended.      Of note, bilateral biopsies cannot be preformed on the same day as the left side will need a medial approach.      8/9/2024 Biopsy    Left breast MRI-guided biopsy  7  o'clock  Lobular neoplasia (ALH and LCIS)    Concordant. Area of non-mass enhancement measured 8 mm on MRI.     8/16/2024 Biopsy    Right breast MRI-guided biopsy  A. 12 o'clock  Unremarkable ductules and lobules in a background of dense stromal fibrosis  Focal changes suggestive for prior biopsy  Negative for atypia or carcinoma    B. 10 o'clock  Focus of columnar cell change and columnar cell hyperplasia with calcifications  Negative for atypia or carcinoma    Concordant. Continued surgical management for known ipsilateral breast carcinoma recommended.     9/19/2024 Surgery    Right modified radical mastectomy left simple mastectomy immediate reconstruction with Dr. Archibald  RIGHT  Invasive breast carcinoma of no special type (ductal NST / invasive ductal carcinoma)   34 mm  Separate tumor nodule, 11 mm, most compatible with completely replaced lymph node   Grade 2   Invasive carcinoma less than 1 mm from posterior margin   Subdermal invasion by tumor. Perineural invasion by tumor  5/12 Lymph nodes; positive for extranodal extension  Anatomic stage: at least stage IIIA  Prognostic stage: IB    LEFT  Breast tissue with non-invasive lobular neoplasia (ALH / LCIS) and atypical ductal hyperplasia (ADH)      9/19/2024 -  Cancer Staged    Staging form: Breast, AJCC 8th Edition  - Pathologic stage from 9/19/2024: Stage IB (pT2, pN2a, cM0, G2, ER+, SD+, HER2-, Oncotype DX score: 20) - Signed by Ros Hyman MD on 9/26/2024  Stage prefix: Initial diagnosis  Multigene prognostic tests performed: Oncotype DX  Recurrence score range: Greater than or equal to 11  Histologic grading system: 3 grade system       10/31/2024 -  Chemotherapy    alteplase (CATHFLO), 2 mg, Intracatheter, Every 1 Minute as needed, 4 of 4 cycles  pegfilgrastim (NEULASTA ONPRO), 6 mg, Subcutaneous, Once, 4 of 4 cycles  Administration: 6 mg (10/31/2024), 6 mg (11/21/2024), 6 mg (12/12/2024)  cyclophosphamide (CYTOXAN) IVPB, 1,020 mg, Intravenous, Once, 4  of 4 cycles  Administration: 1,000 mg (10/31/2024), 1,000 mg (11/21/2024), 1,000 mg (12/12/2024)  DOCEtaxel (TAXOTERE) chemo infusion, 75 mg/m2 = 127.6 mg, Intravenous, Once, 4 of 4 cycles  Administration: 127.6 mg (10/31/2024), 127.6 mg (11/21/2024), 127.6 mg (12/12/2024)     1/22/2025 - 2/26/2025 Radiation    Treatments:  Course: C1  Plan ID Energy Fractions Dose per Fraction (cGy) Dose Correction (cGy) Total Dose Delivered (cGy) Elapsed Days   RtCW_CNI 10X/6X 25 / 25 200 0 5,000 35    Treatment Dates:  1/22/2025 - 2/26/2025.      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 Archibald  RIGHT  Invasive breast carcinoma of no special type (ductal NST / invasive ductal carcinoma)   34 mm  Separate tumor nodule, 11 mm, most compatible with completely replaced lymph node   Grade 2   Invasive carcinoma less than 1 mm from posterior margin   Subdermal invasion by tumor. Perineural invasion by tumor  5/12 Lymph nodes; positive for extranodal extension  Anatomic stage: at least stage IIIA  Prognostic stage: IB    LEFT  Breast tissue with non-invasive lobular neoplasia (ALH / LCIS) and atypical ductal hyperplasia (ADH)      1/22/2025 - 2/26/2025 Radiation    Treatments:  Course: C1  Plan ID Energy Fractions Dose per Fraction (cGy) Dose Correction (cGy) Total Dose Delivered (cGy) Elapsed Days   RtCW_CNI 10X/6X 25 / 25 200 0 5,000 35    Treatment Dates:  1/22/2025 - 2/26/2025.         Review of Systems Refer to nursing note.    Current Outpatient Medications on File Prior to Visit   Medication Sig Dispense Refill    anastrozole (ARIMIDEX) 1 mg tablet Take 1 tablet (1 mg total) by mouth daily 30 tablet 11    loratadine (CLARITIN) 10 mg tablet Take 10 mg by mouth as needed for allergies      Abemaciclib (Verzenio) 100 MG TABS Take 100 mg by mouth 2 (two) times a day (Patient not taking: Reported  on 3/26/2025) 56 tablet 5    cyclobenzaprine (FLEXERIL) 10 mg tablet Take 1 tablet (10 mg total) by mouth 3 (three) times a day as needed for muscle spasms (Patient not taking: Reported on 3/26/2025) 30 tablet 0    dexamethasone (DECADRON) 4 mg tablet Take 2 tablets (8 mg total) by mouth 2 (two) times a day with meals START DAY BEFORE CHEMO DAY OF CHEMO AND DAY AFTER CHEMO EVERY 3 WEEKS FOR FOUR CYCLES (Patient not taking: Reported on 3/26/2025) 12 tablet 3    enoxaparin (Lovenox) 40 mg/0.4 mL Inject 0.4 mL (40 mg total) under the skin in the morning for 7 days 2.8 mL 0    gabapentin (Neurontin) 300 mg capsule Take 1 capsule (300 mg total) by mouth 3 (three) times a day for 7 days 21 capsule 0    ondansetron (ZOFRAN) 8 mg tablet Take 1 tablet (8 mg total) by mouth every 8 (eight) hours as needed for nausea or vomiting (Patient not taking: Reported on 3/26/2025) 20 tablet 3    oxyCODONE (Roxicodone) 5 immediate release tablet Take 1 tablet (5 mg total) by mouth every 6 (six) hours as needed for severe pain Max Daily Amount: 20 mg (Patient not taking: Reported on 3/26/2025) 10 tablet 0     No current facility-administered medications on file prior to visit.      Social History     Tobacco Use    Smoking status: Never     Passive exposure: Never    Smokeless tobacco: Never   Vaping Use    Vaping status: Never Used   Substance and Sexual Activity    Alcohol use: Not Currently     Comment: Very rarely, 1 or 2 beers if i do.    Drug use: No    Sexual activity: Yes     Partners: Male     Comment: Tubes tied 16 years ago         Objective   /80   Pulse 76   Temp 97.6 °F (36.4 °C)   Resp 16   Wt 72.6 kg (160 lb)   SpO2 99%   BMI 27.46 kg/m²     Pain Screening:  Pain Score: 0-No pain  ECOG  0  Physical Exam  Vitals and nursing note reviewed.   Constitutional:       General: She is not in acute distress.  Cardiovascular:      Rate and Rhythm: Normal rate.   Chest:          Comments: Status post bilateral  "mastectomies with expanders in place.  There is very mild diffuse hyperpigmentation of the right reconstructed breast and mild diffuse fibrosis, more notable along the lateral expander edge.  Tissue appears healthy and intact and scars are closed and healed.  Musculoskeletal:      Right lower leg: No edema.      Left lower leg: No edema.   Lymphadenopathy:      Cervical: No cervical adenopathy.      Upper Body:      Right upper body: No supraclavicular or axillary adenopathy.      Left upper body: No supraclavicular or axillary adenopathy.   Neurological:      Mental Status: She is alert and oriented to person, place, and time.      Gait: Gait normal.      Comments: No upper extremity edema bilaterally.  Full range of motion upper extremities bilaterally.            Administrative Statements   I have spent a total time of 25 minutes in caring for this patient on the day of the visit/encounter   Portions of the record may have been created with voice recognition software.  Occasional wrong word or \"sound a like\" substitutions may have occurred due to the inherent limitations of voice recognition software.  Read the chart carefully and recognize, using context, where substitutions have occurred.  "

## 2025-03-28 ENCOUNTER — APPOINTMENT (OUTPATIENT)
Dept: LAB | Facility: CLINIC | Age: 50
End: 2025-03-28
Payer: COMMERCIAL

## 2025-03-28 DIAGNOSIS — C77.3 CARCINOMA OF RIGHT BREAST METASTATIC TO AXILLARY LYMPH NODE (HCC): ICD-10-CM

## 2025-03-28 DIAGNOSIS — C50.911 CARCINOMA OF RIGHT BREAST METASTATIC TO AXILLARY LYMPH NODE (HCC): ICD-10-CM

## 2025-03-28 LAB
BASOPHILS # BLD AUTO: 0.02 THOUSANDS/ÂΜL (ref 0–0.1)
BASOPHILS NFR BLD AUTO: 1 % (ref 0–1)
EOSINOPHIL # BLD AUTO: 0.11 THOUSAND/ÂΜL (ref 0–0.61)
EOSINOPHIL NFR BLD AUTO: 8 % (ref 0–6)
ERYTHROCYTE [DISTWIDTH] IN BLOOD BY AUTOMATED COUNT: 11 % (ref 11.6–15.1)
HCT VFR BLD AUTO: 35.3 % (ref 34.8–46.1)
HGB BLD-MCNC: 12.5 G/DL (ref 11.5–15.4)
IMM GRANULOCYTES # BLD AUTO: 0.01 THOUSAND/UL (ref 0–0.2)
IMM GRANULOCYTES NFR BLD AUTO: 1 % (ref 0–2)
LYMPHOCYTES # BLD AUTO: 0.33 THOUSANDS/ÂΜL (ref 0.6–4.47)
LYMPHOCYTES NFR BLD AUTO: 23 % (ref 14–44)
MCH RBC QN AUTO: 31.2 PG (ref 26.8–34.3)
MCHC RBC AUTO-ENTMCNC: 35.4 G/DL (ref 31.4–37.4)
MCV RBC AUTO: 88 FL (ref 82–98)
MONOCYTES # BLD AUTO: 0.16 THOUSAND/ÂΜL (ref 0.17–1.22)
MONOCYTES NFR BLD AUTO: 11 % (ref 4–12)
NEUTROPHILS # BLD AUTO: 0.78 THOUSANDS/ÂΜL (ref 1.85–7.62)
NEUTS SEG NFR BLD AUTO: 56 % (ref 43–75)
NRBC BLD AUTO-RTO: 0 /100 WBCS
PLATELET # BLD AUTO: 81 THOUSANDS/UL (ref 149–390)
PMV BLD AUTO: 10.2 FL (ref 8.9–12.7)
RBC # BLD AUTO: 4.01 MILLION/UL (ref 3.81–5.12)
WBC # BLD AUTO: 1.41 THOUSAND/UL (ref 4.31–10.16)

## 2025-03-28 PROCEDURE — 36415 COLL VENOUS BLD VENIPUNCTURE: CPT

## 2025-03-28 PROCEDURE — 85025 COMPLETE CBC W/AUTO DIFF WBC: CPT

## 2025-04-02 ENCOUNTER — TELEPHONE (OUTPATIENT)
Dept: SURGICAL ONCOLOGY | Facility: CLINIC | Age: 50
End: 2025-04-02

## 2025-04-02 NOTE — TELEPHONE ENCOUNTER
Received e mail today from patient regarding her current bill situation:    Fadi Childs.  Right now we have a total bill that cost almost 16,000 dollars.  Obviously we cannot afford to pay this bill outright.  It will be a couple hundred a month to set up a payment plan, which we also cannot afford to do.    We are also already paying 100 per month for our Son's current medical bill due to an injury he sustained in a high school basketball practice in October 2023.  The absolute best we could possiby do is combine  the amount I owe with his bill and still pay the 100 per month we are currently paying.      Is there any way to negotiate my current bill down, or access any type of aid available for Cancer patients ?  We are a one income family, our son is now in college and we have a daughter that will be going to college soon.  We have a mortgage, car payments and today's economy with everything being so expensive, to add another monthly payment is impossible for us.  Any suggestions or help you can provide would be greatly appreciated.  Thank you.      Caroline Villatoro    I was able to figure out who the patient was who was contacting me through [employer] by her e mail address.    I placed a call back to the patient after reviewing her chart for notes.  She had an issue with renewing her co pay card which was resolved and then I had notes from back on 09/4/24 where I spoke to the patient's  for a high self pay balance back then and he asked that I e mail an application for FA assistance to his work e mail for him to review.     So on my call to the patient today it went to . I left my call back information and a brief message regarding her bills and that we CANNOT negotiate or merge bills and that in the past an application had been sent to either her (in February 2025) or her  for possible assistance with our [facility] Financial [facility]or team.    I will request that ANOTHER application be mailed out to  the patient to consider applying for possible FA Assistance through the [facility] Financial Counselor team today via e mail.

## 2025-04-04 ENCOUNTER — APPOINTMENT (OUTPATIENT)
Dept: LAB | Facility: CLINIC | Age: 50
End: 2025-04-04
Payer: COMMERCIAL

## 2025-04-04 DIAGNOSIS — C50.511 MALIGNANT NEOPLASM OF LOWER-OUTER QUADRANT OF RIGHT BREAST OF FEMALE, ESTROGEN RECEPTOR POSITIVE (HCC): ICD-10-CM

## 2025-04-04 DIAGNOSIS — Z17.0 MALIGNANT NEOPLASM OF LOWER-OUTER QUADRANT OF RIGHT BREAST OF FEMALE, ESTROGEN RECEPTOR POSITIVE (HCC): ICD-10-CM

## 2025-04-04 DIAGNOSIS — E83.42 HYPOMAGNESEMIA: ICD-10-CM

## 2025-04-04 LAB
ALBUMIN SERPL BCG-MCNC: 4.3 G/DL (ref 3.5–5)
ALP SERPL-CCNC: 72 U/L (ref 34–104)
ALT SERPL W P-5'-P-CCNC: 16 U/L (ref 7–52)
ANION GAP SERPL CALCULATED.3IONS-SCNC: 6 MMOL/L (ref 4–13)
AST SERPL W P-5'-P-CCNC: 20 U/L (ref 13–39)
BASOPHILS # BLD AUTO: 0.03 THOUSANDS/ÂΜL (ref 0–0.1)
BASOPHILS NFR BLD AUTO: 2 % (ref 0–1)
BILIRUB SERPL-MCNC: 0.52 MG/DL (ref 0.2–1)
BUN SERPL-MCNC: 9 MG/DL (ref 5–25)
CALCIUM SERPL-MCNC: 9.3 MG/DL (ref 8.4–10.2)
CHLORIDE SERPL-SCNC: 106 MMOL/L (ref 96–108)
CO2 SERPL-SCNC: 30 MMOL/L (ref 21–32)
CREAT SERPL-MCNC: 0.75 MG/DL (ref 0.6–1.3)
EOSINOPHIL # BLD AUTO: 0.13 THOUSAND/ÂΜL (ref 0–0.61)
EOSINOPHIL NFR BLD AUTO: 7 % (ref 0–6)
ERYTHROCYTE [DISTWIDTH] IN BLOOD BY AUTOMATED COUNT: 13 % (ref 11.6–15.1)
GFR SERPL CREATININE-BSD FRML MDRD: 94 ML/MIN/1.73SQ M
GLUCOSE P FAST SERPL-MCNC: 82 MG/DL (ref 65–99)
HCT VFR BLD AUTO: 36.9 % (ref 34.8–46.1)
HGB BLD-MCNC: 13 G/DL (ref 11.5–15.4)
IMM GRANULOCYTES # BLD AUTO: 0 THOUSAND/UL (ref 0–0.2)
IMM GRANULOCYTES NFR BLD AUTO: 0 % (ref 0–2)
LYMPHOCYTES # BLD AUTO: 0.34 THOUSANDS/ÂΜL (ref 0.6–4.47)
LYMPHOCYTES NFR BLD AUTO: 19 % (ref 14–44)
MAGNESIUM SERPL-MCNC: 2.1 MG/DL (ref 1.9–2.7)
MCH RBC QN AUTO: 31.5 PG (ref 26.8–34.3)
MCHC RBC AUTO-ENTMCNC: 35.2 G/DL (ref 31.4–37.4)
MCV RBC AUTO: 89 FL (ref 82–98)
MONOCYTES # BLD AUTO: 0.3 THOUSAND/ÂΜL (ref 0.17–1.22)
MONOCYTES NFR BLD AUTO: 17 % (ref 4–12)
NEUTROPHILS # BLD AUTO: 0.96 THOUSANDS/ÂΜL (ref 1.85–7.62)
NEUTS SEG NFR BLD AUTO: 55 % (ref 43–75)
NRBC BLD AUTO-RTO: 0 /100 WBCS
PLATELET # BLD AUTO: 171 THOUSANDS/UL (ref 149–390)
PMV BLD AUTO: 10 FL (ref 8.9–12.7)
POTASSIUM SERPL-SCNC: 4 MMOL/L (ref 3.5–5.3)
PROT SERPL-MCNC: 6.7 G/DL (ref 6.4–8.4)
RBC # BLD AUTO: 4.13 MILLION/UL (ref 3.81–5.12)
SODIUM SERPL-SCNC: 142 MMOL/L (ref 135–147)
WBC # BLD AUTO: 1.76 THOUSAND/UL (ref 4.31–10.16)

## 2025-04-04 PROCEDURE — 36415 COLL VENOUS BLD VENIPUNCTURE: CPT

## 2025-04-04 PROCEDURE — 85025 COMPLETE CBC W/AUTO DIFF WBC: CPT

## 2025-04-04 PROCEDURE — 83735 ASSAY OF MAGNESIUM: CPT

## 2025-04-04 PROCEDURE — 80053 COMPREHEN METABOLIC PANEL: CPT

## 2025-04-07 ENCOUNTER — HOSPITAL ENCOUNTER (OUTPATIENT)
Dept: INFUSION CENTER | Facility: CLINIC | Age: 50
Discharge: HOME | End: 2025-04-07
Payer: COMMERCIAL

## 2025-04-07 DIAGNOSIS — Z17.0 MALIGNANT NEOPLASM OF LOWER-OUTER QUADRANT OF RIGHT BREAST OF FEMALE, ESTROGEN RECEPTOR POSITIVE (HCC): Primary | ICD-10-CM

## 2025-04-07 DIAGNOSIS — C50.511 MALIGNANT NEOPLASM OF LOWER-OUTER QUADRANT OF RIGHT BREAST OF FEMALE, ESTROGEN RECEPTOR POSITIVE (HCC): Primary | ICD-10-CM

## 2025-04-07 PROCEDURE — 96402 CHEMO HORMON ANTINEOPL SQ/IM: CPT

## 2025-04-07 RX ADMIN — LEUPROLIDE ACETATE 7.5 MG: KIT at 09:07

## 2025-04-07 NOTE — PROGRESS NOTES
Pt presents for Lupron injection, offering no complaints. Lupron given L ventrogluteal, tolerated well. AVS declined. Next appt 5/5 9am. Walked out in stable condition.

## 2025-04-10 ENCOUNTER — TELEPHONE (OUTPATIENT)
Age: 50
End: 2025-04-10

## 2025-04-10 DIAGNOSIS — C77.3 CARCINOMA OF RIGHT BREAST METASTATIC TO AXILLARY LYMPH NODE (HCC): Primary | ICD-10-CM

## 2025-04-10 DIAGNOSIS — C50.911 CARCINOMA OF RIGHT BREAST METASTATIC TO AXILLARY LYMPH NODE (HCC): Primary | ICD-10-CM

## 2025-04-10 NOTE — TELEPHONE ENCOUNTER
Received a phone call from Anne-Marie from radiology.  Anne-Marie would like to confirm that DXA scan order is for body comp analysis.  Patient has appointment on 4/14 to have this done.  Please correct order if needed by then.

## 2025-04-14 ENCOUNTER — HOSPITAL ENCOUNTER (OUTPATIENT)
Age: 50
Discharge: HOME | End: 2025-04-14
Payer: COMMERCIAL

## 2025-04-14 DIAGNOSIS — C77.3 CARCINOMA OF RIGHT BREAST METASTATIC TO AXILLARY LYMPH NODE (HCC): ICD-10-CM

## 2025-04-14 DIAGNOSIS — C50.911 CARCINOMA OF RIGHT BREAST METASTATIC TO AXILLARY LYMPH NODE (HCC): ICD-10-CM

## 2025-04-14 PROCEDURE — 77080 DXA BONE DENSITY AXIAL: CPT

## 2025-04-22 ENCOUNTER — APPOINTMENT (OUTPATIENT)
Dept: LAB | Facility: CLINIC | Age: 50
End: 2025-04-22
Payer: COMMERCIAL

## 2025-04-22 DIAGNOSIS — Z17.0 MALIGNANT NEOPLASM OF LOWER-OUTER QUADRANT OF RIGHT BREAST OF FEMALE, ESTROGEN RECEPTOR POSITIVE (HCC): ICD-10-CM

## 2025-04-22 DIAGNOSIS — E83.42 HYPOMAGNESEMIA: ICD-10-CM

## 2025-04-22 DIAGNOSIS — C50.511 MALIGNANT NEOPLASM OF LOWER-OUTER QUADRANT OF RIGHT BREAST OF FEMALE, ESTROGEN RECEPTOR POSITIVE (HCC): ICD-10-CM

## 2025-04-22 LAB
ALBUMIN SERPL BCG-MCNC: 4.3 G/DL (ref 3.5–5)
ALP SERPL-CCNC: 59 U/L (ref 34–104)
ALT SERPL W P-5'-P-CCNC: 15 U/L (ref 7–52)
ANION GAP SERPL CALCULATED.3IONS-SCNC: 6 MMOL/L (ref 4–13)
AST SERPL W P-5'-P-CCNC: 21 U/L (ref 13–39)
BASOPHILS # BLD AUTO: 0.04 THOUSANDS/ÂΜL (ref 0–0.1)
BASOPHILS NFR BLD AUTO: 2 % (ref 0–1)
BILIRUB SERPL-MCNC: 0.65 MG/DL (ref 0.2–1)
BUN SERPL-MCNC: 13 MG/DL (ref 5–25)
CALCIUM SERPL-MCNC: 9.3 MG/DL (ref 8.4–10.2)
CHLORIDE SERPL-SCNC: 107 MMOL/L (ref 96–108)
CO2 SERPL-SCNC: 27 MMOL/L (ref 21–32)
CREAT SERPL-MCNC: 0.92 MG/DL (ref 0.6–1.3)
EOSINOPHIL # BLD AUTO: 0.07 THOUSAND/ÂΜL (ref 0–0.61)
EOSINOPHIL NFR BLD AUTO: 4 % (ref 0–6)
ERYTHROCYTE [DISTWIDTH] IN BLOOD BY AUTOMATED COUNT: 14.9 % (ref 11.6–15.1)
GFR SERPL CREATININE-BSD FRML MDRD: 73 ML/MIN/1.73SQ M
GLUCOSE P FAST SERPL-MCNC: 86 MG/DL (ref 65–99)
HCT VFR BLD AUTO: 38.7 % (ref 34.8–46.1)
HGB BLD-MCNC: 13.3 G/DL (ref 11.5–15.4)
IMM GRANULOCYTES # BLD AUTO: 0 THOUSAND/UL (ref 0–0.2)
IMM GRANULOCYTES NFR BLD AUTO: 0 % (ref 0–2)
LYMPHOCYTES # BLD AUTO: 0.47 THOUSANDS/ÂΜL (ref 0.6–4.47)
LYMPHOCYTES NFR BLD AUTO: 27 % (ref 14–44)
MAGNESIUM SERPL-MCNC: 2 MG/DL (ref 1.9–2.7)
MCH RBC QN AUTO: 31.6 PG (ref 26.8–34.3)
MCHC RBC AUTO-ENTMCNC: 34.4 G/DL (ref 31.4–37.4)
MCV RBC AUTO: 92 FL (ref 82–98)
MONOCYTES # BLD AUTO: 0.13 THOUSAND/ÂΜL (ref 0.17–1.22)
MONOCYTES NFR BLD AUTO: 8 % (ref 4–12)
NEUTROPHILS # BLD AUTO: 1.02 THOUSANDS/ÂΜL (ref 1.85–7.62)
NEUTS SEG NFR BLD AUTO: 59 % (ref 43–75)
NRBC BLD AUTO-RTO: 0 /100 WBCS
PLATELET # BLD AUTO: 166 THOUSANDS/UL (ref 149–390)
PMV BLD AUTO: 10.4 FL (ref 8.9–12.7)
POTASSIUM SERPL-SCNC: 4.1 MMOL/L (ref 3.5–5.3)
PROT SERPL-MCNC: 6.7 G/DL (ref 6.4–8.4)
RBC # BLD AUTO: 4.21 MILLION/UL (ref 3.81–5.12)
SODIUM SERPL-SCNC: 140 MMOL/L (ref 135–147)
WBC # BLD AUTO: 1.73 THOUSAND/UL (ref 4.31–10.16)

## 2025-04-22 PROCEDURE — 36415 COLL VENOUS BLD VENIPUNCTURE: CPT

## 2025-04-22 PROCEDURE — 83735 ASSAY OF MAGNESIUM: CPT

## 2025-04-22 PROCEDURE — 80053 COMPREHEN METABOLIC PANEL: CPT

## 2025-04-22 PROCEDURE — 85025 COMPLETE CBC W/AUTO DIFF WBC: CPT

## 2025-04-23 ENCOUNTER — TELEPHONE (OUTPATIENT)
Dept: HEMATOLOGY ONCOLOGY | Facility: CLINIC | Age: 50
End: 2025-04-23

## 2025-04-23 ENCOUNTER — OFFICE VISIT (OUTPATIENT)
Dept: HEMATOLOGY ONCOLOGY | Facility: CLINIC | Age: 50
End: 2025-04-23
Payer: COMMERCIAL

## 2025-04-23 VITALS
TEMPERATURE: 98.8 F | WEIGHT: 155 LBS | RESPIRATION RATE: 18 BRPM | SYSTOLIC BLOOD PRESSURE: 126 MMHG | HEIGHT: 63 IN | HEART RATE: 93 BPM | OXYGEN SATURATION: 99 % | BODY MASS INDEX: 27.46 KG/M2 | DIASTOLIC BLOOD PRESSURE: 80 MMHG

## 2025-04-23 DIAGNOSIS — C50.911 CARCINOMA OF RIGHT BREAST METASTATIC TO AXILLARY LYMPH NODE (HCC): Primary | ICD-10-CM

## 2025-04-23 DIAGNOSIS — E55.9 VITAMIN D DEFICIENCY: ICD-10-CM

## 2025-04-23 DIAGNOSIS — C50.511 MALIGNANT NEOPLASM OF LOWER-OUTER QUADRANT OF RIGHT BREAST OF FEMALE, ESTROGEN RECEPTOR POSITIVE (HCC): ICD-10-CM

## 2025-04-23 DIAGNOSIS — C77.3 CARCINOMA OF RIGHT BREAST METASTATIC TO AXILLARY LYMPH NODE (HCC): Primary | ICD-10-CM

## 2025-04-23 DIAGNOSIS — Z17.0 MALIGNANT NEOPLASM OF LOWER-OUTER QUADRANT OF RIGHT BREAST OF FEMALE, ESTROGEN RECEPTOR POSITIVE (HCC): ICD-10-CM

## 2025-04-23 PROCEDURE — 99214 OFFICE O/P EST MOD 30 MIN: CPT | Performed by: INTERNAL MEDICINE

## 2025-04-23 RX ORDER — ERGOCALCIFEROL 1.25 MG/1
50000 CAPSULE, LIQUID FILLED ORAL WEEKLY
Qty: 8 CAPSULE | Refills: 0 | Status: SHIPPED | OUTPATIENT
Start: 2025-04-23 | End: 2025-06-12

## 2025-04-23 NOTE — PROGRESS NOTES
Name: Stacey Villatoro      : 1975      MRN: [id number]  Encounter Provider: Ros Hyman MD  Encounter Date: 2025   Encounter department: [facility] [place]  :  Assessment & Plan  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)    Orders:    CBC and differential; Future    Comprehensive metabolic panel; Future    Magnesium; Future    Malignant neoplasm of lower-outer quadrant of right breast of female, estrogen receptor positive (HCC)    Anatomical stage IIIa and pathological stage Ib (pT2, pN2a, cM0, G2), ER positive, IA positive, HER2/divina 1+ negative, 4 /10 involved with metastatic disease with extranodal extension. Oncotype DX recurrence score of 20.     Status post radical bilateral mastectomy on 2024.  In the left breast mastectomy was found to have noninvasive lobular neoplasia.  She completed 4 cycles of adjuvant chemotherapy with docetaxel and cyclophosphamide on 2025.  She completed her adjuvant radiation treatment in 2025.    The patient was started on abemaciclib full dose 150 mg twice a day around the beginning of 2025.  She unfortunately did not tolerate the full dose of abemaciclib which had to be decreased down to 100 mg twice a day due to significant diarrhea.  She stated that she is tolerating the adjusted dose of abemaciclib relatively better with persistent watery diarrhea once a day.  I did ask her to continue to use Imodium and hydrate herself well.  She was told that she can stop the abemaciclib for a week while she is on vacation.  She was encouraged to continue with the anastrozole on daily basis without interruption.    Vitamin D deficiency  High vitamin D weekly x 8 weeks was prescribed.  She was encouraged to exercise on regular basis.    Orders:    ergocalciferol (VITAMIN D2) 50,000 units; Take 1 capsule (50,000 Units total) by mouth once a week for 8 doses        Return in about 3 months (around  7/23/2025) for Office Visit 20 min, Labs.    History of Present Illness   Chief Complaint   Patient presents with    Follow-up   HPI:  This is a 49-year-old female without significant past medical history.  Family history is negative for breast cancer.  The patient apparently had her first screening mammogram on 6/18/2024 which was read as abnormal with right breast mass at 7 o'clock position 7 cm from the nipple with suspicious lymph adenopathy in the right axilla.  She then had multiple imaging including diagnostic mammogram, ultrasound of the right breast and ultrasound-guided biopsy of the right breast mass and right axillary lymph node on 7/11/2024.  The pathology was compatible with invasive breast cancer, ER strongly +95%, OK +70%, HER2/divina negative 1+.  CT scan of the chest abdomen pelvis on 7/18/2024 was negative for any obvious signs of metastatic disease.  MRI of the breast bilaterally on 7/19/2024 showed  MPRESSION:   Continued surgical and oncologic management is recommended for the biopsy proven malignancy of the right breast corresponding to a 3.4 cm mass at 7:00 in the right breast and the biopsy proven metastatic right axillary lymph node. There are approximately 4 enlarged right level 1 lymph nodes. No internal mammary adenopathy.      Indeterminate mass at 12:00 and non-mass enhancement at 9:00 in the right breast. If patient is pursuing breast conservation therapy, two site MRI guided biopsy of the right breast is recommended.      Indeterminate non-mass enhancement at 7:00 in the left breast. MRI guided biopsy is recommended.      Biopsy from the left breast at 7 o'clock position showed lobular neoplasia( ALH & LCIS, 4 foci with 5mm largest focus) .  She also had an MRI guided biopsy of the right breast on T 12:00 and 10 o'clock position which came back negative for malignant process.     [facility] molecular screening on 6/4/2024 was negative for obvious genetic alteration.     The patient then  underwent bilateral total mastectomy on 9/19/2024 which showed:  Final Diagnosis   A.  Right breast (modified radical mastectomy):     - Invasive breast carcinoma of no special type (ductal NST / invasive ductal carcinoma).      - Tumor size: 34 mm.  Tumor rdgrdrrdarddrderd:rd rd3rd of 3.      - Invasive carcinoma less than 1 mm from posterior margin.      - Separate tumor nodule, 11 mm, most compatible with completely replaced lymph node.     - One (1) additional lymph node, negative for carcinoma.      B.  Levels 1-2, right axilla (dissection):     - Metastatic mammary carcinoma involving four (4) of ten (10) lymph nodes.      - Tumor deposits measure from 7-20 mm; positive for extranodal extension.     C.  Left breast (mastectomy):     - Breast tissue with non-invasive lobular neoplasia (ALH / LCIS) and atypical ductal hyperplasia (ADH).     - Negative for invasive carcinoma.     D.  New superior margin, right breast (excision):     - Benign skin and subcutaneous tissue.         Oncotype Dx recurrence score came back 20.        Interval history:   The patient came today for a follow-up visit.  She was not able to tolerate the full dose of abemaciclib of 150 mg twice a day due to significant diarrhea.  She states that she is doing a little bit better with the minocycline 100 milligram twice a day with only 1 watery stool.  She is also using Imodium frequently.  She is tolerating the anastrozole relatively well.  Recent blood work on 4/22/2025 showed white cell count of 1.7 with normal hemoglobin hematocrit and platelet count.  ANC was 1.0.  CMP was entirely normal.  Her vitamin D level on 3/21/2025 was 20.          Oncology History   Cancer Staging   Malignant neoplasm of lower-outer quadrant of right breast of female, estrogen receptor positive (HCC)  Staging form: Breast, AJCC 8th Edition  - Pathologic stage from 9/19/2024: Stage IB (pT2, pN2a, cM0, G2, ER+, SC+, HER2-, Oncotype DX score: 20) - Signed by Ros Hyman MD on  9/26/2024  Stage prefix: Initial diagnosis  Multigene prognostic tests performed: Oncotype DX  Recurrence score range: Greater than or equal to 11  Histologic grading system: 3 grade system  Oncology History   Malignant neoplasm of lower-outer quadrant of right breast of female, estrogen receptor positive (HCC)   7/11/2024 Biopsy    Right breast ultrasound guided biopsy  A. 7 o'clock 7 cm from nipple  Invasive breast carcinoma of no special type (ductal NST/invasive ductal carcinoma)   Grade 2  ER 95, NE 61, HER2 1+  No lymphovascular invasion    B. Right axillary lymph node  Invasive ductal carcinoma of mammary origin, involving fibroadipose tissue, see comment.   Lymphoid tissue is not identified.    C. Right axillary lymph node  Invasive ductal carcinoma of mammary origin, involving fibroadipose tissue, see comment.   Lymphoid tissue is not identified.    Right malignancy appears unifocal. The biopsy-proven carcinoma measured 0.6 cm on ultrasound. There is metastatic disease to a right axillary lymph node.  A second abnormal appearing lymph node is noted in the right axilla (not biopsied). 6/18/2024 left breast mammo negative.     7/18/2024 Genetic Testing    NEGATIVE: No Clinically Significant Variants Detected  [place] - A total of 59 genes were evaluated with RNA insight: APC, RICKY, BAP1, BARD1, BMPR1A, BRCA1, BRCA2, BRIP1, CDH1, CDK4, CDKN1B, CDKN2A, CHEK2, DICER1, FH, FLCN, KIF1B, MAX, MEN1, MET, MLH1, MSH2, MSH6, MUTYH, NF1, NTHL1, PALB2, PMS2, POT1, PTEN, RAD51C, RAD51D, RB1, RET, SDHA, SDHAF2, SDHB, SDHC, SDHD, SMAD4, SMARCA4, STK11, WVVE111, TP53, TSC1, TSC2 and VHL (sequencing and deletion/duplication); AXIN2, CTNNA1, EGLN1, HOXB13, KIT, MITF, MSH3, PDGFRA, POLD1 and POLE (sequencing only); EPCAM and GREM1 (deletion/duplication only).     7/19/2024 Observation    Bilateral Breast MRI IMPRESSION:  Continued surgical and oncologic management is recommended for the biopsy proven malignancy of the right breast  corresponding to a 3.4 cm mass at 7:00 in the right breast and the biopsy proven metastatic right axillary lymph node. There are approximately 4 enlarged right level 1 lymph nodes. No internal mammary adenopathy.      Indeterminate mass at 12:00 and non-mass enhancement at 9:00 in the right breast. If patient is pursuing breast conservation therapy, two site MRI guided biopsy of the right breast is recommended.      Indeterminate non-mass enhancement at 7:00 in the left breast. MRI guided biopsy is recommended.      Of note, bilateral biopsies cannot be preformed on the same day as the left side will need a medial approach.      8/9/2024 Biopsy    Left breast MRI-guided biopsy  7 o'clock  Lobular neoplasia (ALH and LCIS)    Concordant. Area of non-mass enhancement measured 8 mm on MRI.     8/16/2024 Biopsy    Right breast MRI-guided biopsy  A. 12 o'clock  Unremarkable ductules and lobules in a background of dense stromal fibrosis  Focal changes suggestive for prior biopsy  Negative for atypia or carcinoma    B. 10 o'clock  Focus of columnar cell change and columnar cell hyperplasia with calcifications  Negative for atypia or carcinoma    Concordant. Continued surgical management for known ipsilateral breast carcinoma recommended.     9/19/2024 Surgery    Right modified radical mastectomy left simple mastectomy immediate reconstruction with Dr. Archibald  RIGHT  Invasive breast carcinoma of no special type (ductal NST / invasive ductal carcinoma)   34 mm  Separate tumor nodule, 11 mm, most compatible with completely replaced lymph node   Grade 2   Invasive carcinoma less than 1 mm from posterior margin   Subdermal invasion by tumor. Perineural invasion by tumor  5/12 Lymph nodes; positive for extranodal extension  Anatomic stage: at least stage IIIA  Prognostic stage: IB    LEFT  Breast tissue with non-invasive lobular neoplasia (ALH / LCIS) and atypical ductal hyperplasia (ADH)      9/19/2024 -  Cancer Staged     Staging form: Breast, AJCC 8th Edition  - Pathologic stage from 9/19/2024: Stage IB (pT2, pN2a, cM0, G2, ER+, CA+, HER2-, Oncotype DX score: 20) - Signed by Ros Hyman MD on 9/26/2024  Stage prefix: Initial diagnosis  Multigene prognostic tests performed: Oncotype DX  Recurrence score range: Greater than or equal to 11  Histologic grading system: 3 grade system       10/31/2024 -  Chemotherapy    alteplase (CATHFLO), 2 mg, Intracatheter, Every 1 Minute as needed, 4 of 4 cycles  pegfilgrastim (NEULASTA ONPRO), 6 mg, Subcutaneous, Once, 4 of 4 cycles  Administration: 6 mg (10/31/2024), 6 mg (11/21/2024), 6 mg (12/12/2024)  cyclophosphamide (CYTOXAN) IVPB, 1,020 mg, Intravenous, Once, 4 of 4 cycles  Administration: 1,000 mg (10/31/2024), 1,000 mg (11/21/2024), 1,000 mg (12/12/2024)  DOCEtaxel (TAXOTERE) chemo infusion, 75 mg/m2 = 127.6 mg, Intravenous, Once, 4 of 4 cycles  Administration: 127.6 mg (10/31/2024), 127.6 mg (11/21/2024), 127.6 mg (12/12/2024)     1/22/2025 - 2/26/2025 Radiation    Treatments:  Course: C1  Plan ID Energy Fractions Dose per Fraction (cGy) Dose Correction (cGy) Total Dose Delivered (cGy) Elapsed Days   RtCW_CNI 10X/6X 25 / 25 200 0 5,000 35    Treatment Dates:  1/22/2025 - 2/26/2025.      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 Archibald  RIGHT  Invasive breast carcinoma of no special type (ductal NST / invasive ductal carcinoma)   34 mm  Separate tumor nodule, 11 mm, most compatible with completely replaced lymph node   Grade 2   Invasive carcinoma less than 1 mm from posterior margin   Subdermal invasion by tumor. Perineural invasion by tumor  5/12 Lymph nodes; positive for extranodal extension  Anatomic stage: at least stage IIIA  Prognostic stage: IB    LEFT  Breast tissue with non-invasive lobular neoplasia (ALH / LCIS) and atypical ductal  hyperplasia (ADH)      1/22/2025 - 2/26/2025 Radiation    Treatments:  Course: C1  Plan ID Energy Fractions Dose per Fraction (cGy) Dose Correction (cGy) Total Dose Delivered (cGy) Elapsed Days   RtCW_CNI 10X/6X 25 / 25 200 0 5,000 35    Treatment Dates:  1/22/2025 - 2/26/2025.              Review of Systems   Constitutional:  Negative for chills and fever.   HENT:  Negative for ear pain and sore throat.    Eyes:  Negative for pain and visual disturbance.   Respiratory:  Negative for cough and shortness of breath.    Cardiovascular:  Negative for chest pain and palpitations.   Gastrointestinal:  Positive for diarrhea. Negative for abdominal pain and vomiting.   Genitourinary:  Negative for dysuria and hematuria.   Musculoskeletal:  Negative for arthralgias and back pain.   Skin:  Negative for color change and rash.   Neurological:  Negative for seizures and syncope.   All other systems reviewed and are negative.    Medical History Reviewed by provider this encounter:  Tobacco  Allergies  Meds  Problems  Med Hx  Surg Hx  Fam Hx     .  Current Outpatient Medications on File Prior to Visit   Medication Sig Dispense Refill    anastrozole (ARIMIDEX) 1 mg tablet Take 1 tablet (1 mg total) by mouth daily 30 tablet 11    loratadine (CLARITIN) 10 mg tablet Take 10 mg by mouth as needed for allergies      ondansetron (ZOFRAN) 8 mg tablet Take 1 tablet (8 mg total) by mouth every 8 (eight) hours as needed for nausea or vomiting 20 tablet 3    Abemaciclib (Verzenio) 100 MG TABS Take 100 mg by mouth 2 (two) times a day (Patient not taking: Reported on 3/26/2025) 56 tablet 5    cyclobenzaprine (FLEXERIL) 10 mg tablet Take 1 tablet (10 mg total) by mouth 3 (three) times a day as needed for muscle spasms (Patient not taking: Reported on 3/26/2025) 30 tablet 0    dexamethasone (DECADRON) 4 mg tablet Take 2 tablets (8 mg total) by mouth 2 (two) times a day with meals START DAY BEFORE CHEMO DAY OF CHEMO AND DAY AFTER CHEMO  "EVERY 3 WEEKS FOR FOUR CYCLES (Patient not taking: Reported on 3/26/2025) 12 tablet 3    enoxaparin (Lovenox) 40 mg/0.4 mL Inject 0.4 mL (40 mg total) under the skin in the morning for 7 days 2.8 mL 0    gabapentin (Neurontin) 300 mg capsule Take 1 capsule (300 mg total) by mouth 3 (three) times a day for 7 days 21 capsule 0    oxyCODONE (Roxicodone) 5 immediate release tablet Take 1 tablet (5 mg total) by mouth every 6 (six) hours as needed for severe pain Max Daily Amount: 20 mg (Patient not taking: Reported on 3/26/2025) 10 tablet 0     No current facility-administered medications on file prior to visit.      Social History     Tobacco Use    Smoking status: Never     Passive exposure: Never    Smokeless tobacco: Never   Vaping Use    Vaping status: Never Used   Substance and Sexual Activity    Alcohol use: Not Currently     Comment: Very rarely, 1 or 2 beers if i do.    Drug use: No    Sexual activity: Yes     Partners: Male     Comment: Tubes tied 16 years ago         Objective   /80 (BP Location: Left arm, Patient Position: Sitting, Cuff Size: Adult)   Pulse 93   Temp 98.8 °F (37.1 °C)   Resp 18   Ht 5' 3\" (1.6 m)   Wt 70.3 kg (155 lb)   SpO2 99%   BMI 27.46 kg/m²     Pain Screening:  Pain Score: 0-No pain  ECOG   1  Physical Exam  Constitutional:       General: She is not in acute distress.     Appearance: She is well-developed. She is not diaphoretic.   HENT:      Head: Normocephalic and atraumatic.      Nose: Nose normal.   Eyes:      General: No scleral icterus.        Right eye: No discharge.         Left eye: No discharge.      Conjunctiva/sclera: Conjunctivae normal.      Pupils: Pupils are equal, round, and reactive to light.   Neck:      Thyroid: No thyromegaly.      Vascular: No JVD.      Trachea: No tracheal deviation.   Cardiovascular:      Rate and Rhythm: Normal rate and regular rhythm.      Heart sounds: Normal heart sounds. No murmur heard.     No friction rub.   Pulmonary:      " Effort: Pulmonary effort is normal. No respiratory distress.      Breath sounds: Normal breath sounds. No stridor. No wheezing or rales.   Chest:      Chest wall: No tenderness.   Abdominal:      General: There is no distension.      Palpations: Abdomen is soft. There is no hepatomegaly or splenomegaly.      Tenderness: There is no abdominal tenderness. There is no guarding or rebound.   Musculoskeletal:         General: No tenderness or deformity. Normal range of motion.      Cervical back: Normal range of motion and neck supple.   Lymphadenopathy:      Cervical: No cervical adenopathy.   Skin:     General: Skin is warm and dry.      Coloration: Skin is not pale.      Findings: No erythema or rash.   Neurological:      Mental Status: She is alert and oriented to person, place, and time.      Cranial Nerves: No cranial nerve deficit.      Coordination: Coordination normal.      Deep Tendon Reflexes: Reflexes are normal and symmetric.   Psychiatric:         Behavior: Behavior normal.         Thought Content: Thought content normal.         Judgment: Judgment normal.         Labs: I have reviewed the following labs:  Lab Results   Component Value Date/Time    WBC 1.73 (L) 04/22/2025 08:45 AM    RBC 4.21 04/22/2025 08:45 AM    Hemoglobin 13.3 04/22/2025 08:45 AM    Hematocrit 38.7 04/22/2025 08:45 AM    MCV 92 04/22/2025 08:45 AM    MCH 31.6 04/22/2025 08:45 AM    RDW 14.9 04/22/2025 08:45 AM    Platelets 166 04/22/2025 08:45 AM    Segmented % 59 04/22/2025 08:45 AM    Lymphocytes % 27 04/22/2025 08:45 AM    Monocytes % 8 04/22/2025 08:45 AM    Eosinophils Relative 4 04/22/2025 08:45 AM    Basophils Relative 2 (H) 04/22/2025 08:45 AM    Immature Grans % 0 04/22/2025 08:45 AM    Absolute Neutrophils 1.02 (L) 04/22/2025 08:45 AM     Lab Results   Component Value Date/Time    Potassium 4.1 04/22/2025 08:45 AM    Chloride 107 04/22/2025 08:45 AM    CO2 27 04/22/2025 08:45 AM    BUN 13 04/22/2025 08:45 AM    Creatinine 0.92  "04/22/2025 08:45 AM    Glucose, Fasting 86 04/22/2025 08:45 AM    Calcium 9.3 04/22/2025 08:45 AM    AST 21 04/22/2025 08:45 AM    ALT 15 04/22/2025 08:45 AM    Alkaline Phosphatase 59 04/22/2025 08:45 AM    Total Protein 6.7 04/22/2025 08:45 AM    Albumin 4.3 04/22/2025 08:45 AM    Total Bilirubin 0.65 04/22/2025 08:45 AM    eGFR 73 04/22/2025 08:45 AM     Lab Results   Component Value Date/Time    WBC 1.73 (L) 04/22/2025 08:45 AM    RBC 4.21 04/22/2025 08:45 AM    Hemoglobin 13.3 04/22/2025 08:45 AM    Hematocrit 38.7 04/22/2025 08:45 AM    MCV 92 04/22/2025 08:45 AM    MCH 31.6 04/22/2025 08:45 AM    RDW 14.9 04/22/2025 08:45 AM    Platelets 166 04/22/2025 08:45 AM    Segmented % 59 04/22/2025 08:45 AM    Lymphocytes % 27 04/22/2025 08:45 AM    Monocytes % 8 04/22/2025 08:45 AM    Eosinophils Relative 4 04/22/2025 08:45 AM    Basophils Relative 2 (H) 04/22/2025 08:45 AM    Immature Grans % 0 04/22/2025 08:45 AM    Absolute Neutrophils 1.02 (L) 04/22/2025 08:45 AM      Lab Results   Component Value Date/Time    Sodium 140 04/22/2025 08:45 AM    Potassium 4.1 04/22/2025 08:45 AM    Chloride 107 04/22/2025 08:45 AM    CO2 27 04/22/2025 08:45 AM    ANION GAP 6 04/22/2025 08:45 AM    BUN 13 04/22/2025 08:45 AM    Creatinine 0.92 04/22/2025 08:45 AM    Glucose 89 03/21/2025 09:31 AM    Glucose, Fasting 86 04/22/2025 08:45 AM    Calcium 9.3 04/22/2025 08:45 AM    AST 21 04/22/2025 08:45 AM    ALT 15 04/22/2025 08:45 AM    Alkaline Phosphatase 59 04/22/2025 08:45 AM    Total Protein 6.7 04/22/2025 08:45 AM    Albumin 4.3 04/22/2025 08:45 AM    Total Bilirubin 0.65 04/22/2025 08:45 AM    eGFR 73 04/22/2025 08:45 AM      No results found for: \"IRON\", \"CONCFE\", \"FERRITIN\", \"WDDJHZRL55\", \"FOLATE\", \"COPPER\", \"EPOREFLAB\", \"ERYTHROPRO\", \"ESR\", \"CRP\", \"HIVAGAB\", \"HEPATITIS\"   Results for orders placed or performed in visit on 04/22/25   CBC and differential   Result Value Ref Range    WBC 1.73 (L) 4.31 - 10.16 Thousand/uL    RBC " 4.21 3.81 - 5.12 Million/uL    Hemoglobin 13.3 11.5 - 15.4 g/dL    Hematocrit 38.7 34.8 - 46.1 %    MCV 92 82 - 98 fL    MCH 31.6 26.8 - 34.3 pg    MCHC 34.4 31.4 - 37.4 g/dL    RDW 14.9 11.6 - 15.1 %    MPV 10.4 8.9 - 12.7 fL    Platelets 166 149 - 390 Thousands/uL    nRBC 0 /100 WBCs    Segmented % 59 43 - 75 %    Immature Grans % 0 0 - 2 %    Lymphocytes % 27 14 - 44 %    Monocytes % 8 4 - 12 %    Eosinophils Relative 4 0 - 6 %    Basophils Relative 2 (H) 0 - 1 %    Absolute Neutrophils 1.02 (L) 1.85 - 7.62 Thousands/µL    Absolute Immature Grans 0.00 0.00 - 0.20 Thousand/uL    Absolute Lymphocytes 0.47 (L) 0.60 - 4.47 Thousands/µL    Absolute Monocytes 0.13 (L) 0.17 - 1.22 Thousand/µL    Eosinophils Absolute 0.07 0.00 - 0.61 Thousand/µL    Basophils Absolute 0.04 0.00 - 0.10 Thousands/µL   Comprehensive metabolic panel   Result Value Ref Range    Sodium 140 135 - 147 mmol/L    Potassium 4.1 3.5 - 5.3 mmol/L    Chloride 107 96 - 108 mmol/L    CO2 27 21 - 32 mmol/L    ANION GAP 6 4 - 13 mmol/L    BUN 13 5 - 25 mg/dL    Creatinine 0.92 0.60 - 1.30 mg/dL    Glucose, Fasting 86 65 - 99 mg/dL    Calcium 9.3 8.4 - 10.2 mg/dL    AST 21 13 - 39 U/L    ALT 15 7 - 52 U/L    Alkaline Phosphatase 59 34 - 104 U/L    Total Protein 6.7 6.4 - 8.4 g/dL    Albumin 4.3 3.5 - 5.0 g/dL    Total Bilirubin 0.65 0.20 - 1.00 mg/dL    eGFR 73 ml/min/1.73sq m   Result Value Ref Range    Magnesium 2.0 1.9 - 2.7 mg/dL

## 2025-04-23 NOTE — ASSESSMENT & PLAN NOTE
Anatomical stage IIIa and pathological stage Ib (pT2, pN2a, cM0, G2), ER positive, NM positive, HER2/divina 1+ negative, 4 /10 involved with metastatic disease with extranodal extension. Oncotype DX recurrence score of 20.     Status post radical bilateral mastectomy on 9/19/2024.  In the left breast mastectomy was found to have noninvasive lobular neoplasia.  She completed 4 cycles of adjuvant chemotherapy with docetaxel and cyclophosphamide on 1/2/2025.  She completed her adjuvant radiation treatment in February 2025.    The patient was started on abemaciclib full dose 150 mg twice a day around the beginning of February 2025.  She unfortunately did not tolerate the full dose of abemaciclib which had to be decreased down to 100 mg twice a day due to significant diarrhea.  She stated that she is tolerating the adjusted dose of abemaciclib relatively better with persistent watery diarrhea once a day.  I did ask her to continue to use Imodium and hydrate herself well.  She was told that she can stop the abemaciclib for a week while she is on vacation.  She was encouraged to continue with the anastrozole on daily basis without interruption.

## 2025-04-29 ENCOUNTER — TELEPHONE (OUTPATIENT)
Age: 50
End: 2025-04-29

## 2025-04-29 NOTE — TELEPHONE ENCOUNTER
Called pt to rescheduled her apt this morning due to Casey Taylor running late. She is stuck in traffic this morning. Pt did not answer, LVM to see if she would like to reschedule to Casey's schedule on Friday 5/2/25. Pt will now be seen 5/2/15 @ 10:30am

## 2025-05-02 ENCOUNTER — OFFICE VISIT (OUTPATIENT)
Age: 50
End: 2025-05-02
Payer: COMMERCIAL

## 2025-05-02 DIAGNOSIS — Z48.89 ENCOUNTER FOR FOLLOW-UP CARE INVOLVING PLASTIC SURGERY: Primary | ICD-10-CM

## 2025-05-02 PROCEDURE — 99214 OFFICE O/P EST MOD 30 MIN: CPT | Performed by: PHYSICIAN ASSISTANT

## 2025-05-02 NOTE — PROGRESS NOTES
Patient Identification: Stacey Villatoro is a 50 y.o. female     History of Present Illness: The patient is a 50 y.o.  year-old female  who presents to the office for a follow up visit. Patient is 225 days s/p Bilateral first stage breast reconstruction and placement of submuscular [place] textured 750 mL Yoel tissue expanders, initial fill 100 mL, reinforcement of inferior pole with Flex HD acellular dermal matrix, bilateral pectoralis block with Exparel, right sided axillary advancement flap, 15 cm x 10 cm, placement of bilateral olri none DME negative pressure wound therapy dressings, less than 50 cm² each, total less than 50 cm² on 9/19/2024.    She completed 4 cycles of adjuvant chemotherapy with docetaxel and cyclophosphamide on 1/2/2025. Finished RT to right chest on 2/26/25.   She is currently taking abemaciclib and anastrozole which she will be taking for several years.      Pt is doing well today, denies wounds, fevers, chills, drainage, sign of infection or significant pain. Wearing bra.  She offers no complaints today. Desires implant based recon. She is here to restart tissue expansions.     Total TE volume:   Right: 600/750 ml   Left: 600/750 ml      Past Medical History:   Diagnosis Date    Arthritis     BRCA1 negative     BRCA2 negative     Breast cancer (HCC)       Patient Active Problem List   Diagnosis    Bilateral primary osteoarthritis of knee    BMI 30.0-30.9,adult    Malignant neoplasm of lower-outer quadrant of right breast of female, estrogen receptor positive (HCC)    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)    Status post bilateral breast reconstruction    Status post bilateral mastectomy    Chemotherapy induced neutropenia (HCC)       Current Outpatient Medications:     Abemaciclib (Verzenio) 100 MG TABS, Take 100 mg by mouth 2 (two) times a day, Disp: 56 tablet, Rfl: 5    anastrozole (ARIMIDEX) 1 mg tablet, Take 1 tablet (1 mg total) by mouth daily, Disp: 30  tablet, Rfl: 11    ergocalciferol (VITAMIN D2) 50,000 units, Take 1 capsule (50,000 Units total) by mouth once a week for 8 doses, Disp: 8 capsule, Rfl: 0    loratadine (CLARITIN) 10 mg tablet, Take 10 mg by mouth as needed for allergies, Disp: , Rfl:     cyclobenzaprine (FLEXERIL) 10 mg tablet, Take 1 tablet (10 mg total) by mouth 3 (three) times a day as needed for muscle spasms (Patient not taking: Reported on 2025), Disp: 30 tablet, Rfl: 0    dexamethasone (DECADRON) 4 mg tablet, Take 2 tablets (8 mg total) by mouth 2 (two) times a day with meals START DAY BEFORE CHEMO DAY OF CHEMO AND DAY AFTER CHEMO EVERY 3 WEEKS FOR FOUR CYCLES (Patient not taking: Reported on 2025), Disp: 12 tablet, Rfl: 3    ondansetron (ZOFRAN) 8 mg tablet, Take 1 tablet (8 mg total) by mouth every 8 (eight) hours as needed for nausea or vomiting (Patient not taking: Reported on 2025), Disp: 20 tablet, Rfl: 3    oxyCODONE (Roxicodone) 5 immediate release tablet, Take 1 tablet (5 mg total) by mouth every 6 (six) hours as needed for severe pain Max Daily Amount: 20 mg (Patient not taking: Reported on 2025), Disp: 10 tablet, Rfl: 0    Past Surgical History:   Procedure Laterality Date    BREAST BIOPSY Right 2024     SECTION      x2    FOOT SURGERY      MAMMO NEEDLE LOCALIZATION LEFT (ALL INC) Left 9/3/2024    MRI BREAST BIOPSY LEFT (ALL INCLUSIVE) Left 2024    MRI BREAST BIOPSY RIGHT (ALL INCLUSIVE) Right 2024    CO MAST MODF RAD W/AX LYMPH NOD W/WO PECT/AMALIA MIN Right 2024    Procedure: RIGHT BREAST MODIFIED RADICAL MASTECTOMY LEVEL I AND II LYMPH NODE DISSECTION;  Surgeon: Mert Brink MD;  Location: [facility];  Service: Surgical Oncology    CO MASTECTOMY SIMPLE COMPLETE Left 2024    Procedure: LEFT MASTECTOMY, SAMY CLIPS IN THE RIGHT BREAST/AXILLARY LYMPH NODE AND LEFT BREAST;  Surgeon: Mert Brink MD;  Location: [facility];  Service: Surgical Oncology    CO  TISSUE EXPANDER PLACEMENT BREAST RECONSTRUCTION Bilateral 9/19/2024    Procedure: FIRST STAGE BILATERAL BREAST RECONSTRUCTION WITH TISSUE EXPANDERS AND ADM;  Surgeon: Jonathan Archibald MD;  Location: [facility];  Service: Plastics    US GUIDED BREAST BIOPSY RIGHT COMPLETE Right 7/11/2024    US GUIDED BREAST LYMPH NODE BIOPSY RIGHT Right 7/11/2024     Social History     Tobacco Use    Smoking status: Never     Passive exposure: Never    Smokeless tobacco: Never   Substance Use Topics    Alcohol use: Not Currently     Comment: Very rarely, 1 or 2 beers if i do.     There were no vitals filed for this visit.      Physical Exam  General: NAD, alert  Breasts: Bilateral incisions are clean, dry, and intact. There is no evidence of hematoma or seroma formation.  There is no surrounding erythema, wound breakdown, drainage, or signs of infection. Expected radiation changes to right chest wall.       TE Fill  Procedures:   Under sterile conditions the bilateral tissue expanders were percutaneously accessed without difficulty and 50 ml of NS was injected into the right and 50 ml of NS into the left.  Pt tolerated procedure well.     A ''time out'' was initiated prior to procedure. Non-OR time Out:  Time out was initiated under direction and supervision of provider Casey Taylor PA-C  Correct patient identity - Yes  Correct side and site - Yes  Procedure matches verbalized consent -Yes  Correct patient position - Yes  Availability of correct implants and any special equipment or special requirements - Yes  Time out occurred prior to procedure start - Yes     Total TE volume:   Right: 650/750 ml   Left: 650/750 ml      Assessment and Plan: 50 y.o.  year-old female s/p Right Breast Modified Radical Mastectomy Level I And Ii Lymph Node Dissection - Right, Left Mastectomy, Sheron Clips In The Right Breast/axillary Lymph Node And Left Breast - Left, and First Stage Bilateral Breast Reconstruction With Tissue Expanders And Adm -  Bilateral       -Recommend vaseline/aquaphor to breast incisions daily.   -Patient not interested in PT at this time.  -Restarted tissue expansions today.  The patient is to return in 1 week for additional fills.  - Pt still desires implant based reconstruction - she is aware we have to wait 6-12 months from the end of radiation before next surgery.  -The patient is to call the office with any questions or concerns. All of the patient's questions were answered at this time and they agree with the plan of care.      I have spent a total time of 30 minutes in caring for this patient on the day of the visit/encounter including Risks and benefits of tx options, Instructions for management, Patient and family education, Impressions, Counseling / Coordination of care, Documenting in the medical record, Obtaining or reviewing history  , and Communicating with other healthcare professionals .      Casey Taylor PA-C  Plastic & Reconstructive Surgery

## 2025-05-05 ENCOUNTER — HOSPITAL ENCOUNTER (OUTPATIENT)
Dept: INFUSION CENTER | Facility: CLINIC | Age: 50
Discharge: HOME | End: 2025-05-05
Payer: COMMERCIAL

## 2025-05-05 DIAGNOSIS — C50.511 MALIGNANT NEOPLASM OF LOWER-OUTER QUADRANT OF RIGHT BREAST OF FEMALE, ESTROGEN RECEPTOR POSITIVE (HCC): Primary | ICD-10-CM

## 2025-05-05 DIAGNOSIS — Z17.0 MALIGNANT NEOPLASM OF LOWER-OUTER QUADRANT OF RIGHT BREAST OF FEMALE, ESTROGEN RECEPTOR POSITIVE (HCC): Primary | ICD-10-CM

## 2025-05-05 PROCEDURE — 96402 CHEMO HORMON ANTINEOPL SQ/IM: CPT

## 2025-05-05 RX ADMIN — LEUPROLIDE ACETATE 7.5 MG: KIT at 09:10

## 2025-05-05 NOTE — PROGRESS NOTES
Pt to clinic for Lupron injection. Pt offers no complaints. Tolerated injection in R ventrogluteal without complications. AVS declined. Pt aware of next appointment on 6/2/25 at 930.

## 2025-05-08 ENCOUNTER — OFFICE VISIT (OUTPATIENT)
Age: 50
End: 2025-05-08
Payer: COMMERCIAL

## 2025-05-08 DIAGNOSIS — Z48.89 ENCOUNTER FOR FOLLOW-UP CARE INVOLVING PLASTIC SURGERY: Primary | ICD-10-CM

## 2025-05-08 PROCEDURE — 99214 OFFICE O/P EST MOD 30 MIN: CPT | Performed by: PHYSICIAN ASSISTANT

## 2025-05-08 NOTE — PROGRESS NOTES
Patient Identification: Stacey Villatoro is a 50 y.o. female     History of Present Illness: The patient is a 50 y.o.  year-old female  who presents to the office for a follow up visit. Patient is 231 days s/p Bilateral first stage breast reconstruction and placement of submuscular [place] textured 750 mL Yoel tissue expanders, initial fill 100 mL, reinforcement of inferior pole with Flex HD acellular dermal matrix, bilateral pectoralis block with Exparel, right sided axillary advancement flap, 15 cm x 10 cm, placement of bilateral lori none DME negative pressure wound therapy dressings, less than 50 cm² each, total less than 50 cm² on 9/19/2024.    She completed 4 cycles of adjuvant chemotherapy with docetaxel and cyclophosphamide on 1/2/2025. Finished RT to right chest on 2/26/25.   She is currently taking abemaciclib and anastrozole which she will be taking for several years.      Pt is doing well today, denies wounds, fevers, chills, drainage, sign of infection or significant pain. Wearing bra.  She offers no complaints today. Desires implant based recon. She is here to continue tissue expansions.     Past Medical History:   Diagnosis Date    Arthritis     BRCA1 negative     BRCA2 negative     Breast cancer (HCC)       Patient Active Problem List   Diagnosis    Bilateral primary osteoarthritis of knee    BMI 30.0-30.9,adult    Malignant neoplasm of lower-outer quadrant of right breast of female, estrogen receptor positive (HCC)    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)    Status post bilateral breast reconstruction    Status post bilateral mastectomy    Chemotherapy induced neutropenia (HCC)       Current Outpatient Medications:     Abemaciclib (Verzenio) 100 MG TABS, Take 100 mg by mouth 2 (two) times a day, Disp: 56 tablet, Rfl: 5    anastrozole (ARIMIDEX) 1 mg tablet, Take 1 tablet (1 mg total) by mouth daily, Disp: 30 tablet, Rfl: 11    ergocalciferol (VITAMIN D2) 50,000 units,  Take 1 capsule (50,000 Units total) by mouth once a week for 8 doses, Disp: 8 capsule, Rfl: 0    loratadine (CLARITIN) 10 mg tablet, Take 10 mg by mouth as needed for allergies, Disp: , Rfl:     ondansetron (ZOFRAN) 8 mg tablet, Take 1 tablet (8 mg total) by mouth every 8 (eight) hours as needed for nausea or vomiting, Disp: 20 tablet, Rfl: 3    cyclobenzaprine (FLEXERIL) 10 mg tablet, Take 1 tablet (10 mg total) by mouth 3 (three) times a day as needed for muscle spasms (Patient not taking: Reported on 2025), Disp: 30 tablet, Rfl: 0    dexamethasone (DECADRON) 4 mg tablet, Take 2 tablets (8 mg total) by mouth 2 (two) times a day with meals START DAY BEFORE CHEMO DAY OF CHEMO AND DAY AFTER CHEMO EVERY 3 WEEKS FOR FOUR CYCLES (Patient not taking: Reported on 2025), Disp: 12 tablet, Rfl: 3    oxyCODONE (Roxicodone) 5 immediate release tablet, Take 1 tablet (5 mg total) by mouth every 6 (six) hours as needed for severe pain Max Daily Amount: 20 mg (Patient not taking: Reported on 2025), Disp: 10 tablet, Rfl: 0    Past Surgical History:   Procedure Laterality Date    BREAST BIOPSY Right 2024     SECTION      x2    FOOT SURGERY      MAMMO NEEDLE LOCALIZATION LEFT (ALL INC) Left 9/3/2024    MRI BREAST BIOPSY LEFT (ALL INCLUSIVE) Left 2024    MRI BREAST BIOPSY RIGHT (ALL INCLUSIVE) Right 2024    PA MAST MODF RAD W/AX LYMPH NOD W/WO PECT/AMALIA MIN Right 2024    Procedure: RIGHT BREAST MODIFIED RADICAL MASTECTOMY LEVEL I AND II LYMPH NODE DISSECTION;  Surgeon: Mert Brink MD;  Location: [facility];  Service: Surgical Oncology    PA MASTECTOMY SIMPLE COMPLETE Left 2024    Procedure: LEFT MASTECTOMY, SAMY CLIPS IN THE RIGHT BREAST/AXILLARY LYMPH NODE AND LEFT BREAST;  Surgeon: Mert Brink MD;  Location: [facility];  Service: Surgical Oncology    PA TISSUE EXPANDER PLACEMENT BREAST RECONSTRUCTION Bilateral 2024    Procedure: FIRST STAGE BILATERAL  BREAST RECONSTRUCTION WITH TISSUE EXPANDERS AND ADM;  Surgeon: Jonathan Archibald MD;  Location: [facility];  Service: Plastics    US GUIDED BREAST BIOPSY RIGHT COMPLETE Right 7/11/2024    US GUIDED BREAST LYMPH NODE BIOPSY RIGHT Right 7/11/2024     Social History     Tobacco Use    Smoking status: Never     Passive exposure: Never    Smokeless tobacco: Never   Substance Use Topics    Alcohol use: Not Currently     Comment: Very rarely, 1 or 2 beers if i do.     There were no vitals filed for this visit.      Physical Exam  General: NAD, alert  Breasts: Bilateral incisions are clean, dry, and intact. There is no evidence of hematoma or seroma formation.  There is no surrounding erythema, wound breakdown, drainage, or signs of infection. Expected radiation changes to right chest wall.       TE Fill  Procedures:   Under sterile conditions the bilateral tissue expanders were percutaneously accessed without difficulty and 50 ml of NS was injected into the right and 50 ml of NS into the left.  Pt tolerated procedure well.     A ''time out'' was initiated prior to procedure. Non-OR time Out:  Time out was initiated under direction and supervision of provider Casey Taylor PA-C  Correct patient identity - Yes  Correct side and site - Yes  Procedure matches verbalized consent -Yes  Correct patient position - Yes  Availability of correct implants and any special equipment or special requirements - Yes  Time out occurred prior to procedure start - Yes     Total TE volume:   Right: 700/750 ml   Left: 700/750 ml      Assessment and Plan: 50 y.o.  year-old female s/p Right Breast Modified Radical Mastectomy Level I And Ii Lymph Node Dissection - Right, Left Mastectomy, Sheron Clips In The Right Breast/axillary Lymph Node And Left Breast - Left, and First Stage Bilateral Breast Reconstruction With Tissue Expanders And Adm - Bilateral       -Recommend vaseline/aquaphor to breast incisions daily.   -Patient not interested in PT at  this time.  -Restarted tissue expansions today.  The patient is to return in 1 week for additional fills.  - Pt still desires implant based reconstruction - she is aware we have to wait 6-12 months from the end of radiation before next surgery.  -The patient is to call the office with any questions or concerns. All of the patient's questions were answered at this time and they agree with the plan of care.      I have spent a total time of 30 minutes in caring for this patient on the day of the visit/encounter including Risks and benefits of tx options, Instructions for management, Patient and family education, Impressions, Counseling / Coordination of care, Documenting in the medical record, Obtaining or reviewing history  , and Communicating with other healthcare professionals .      Casey Taylor PA-C  Plastic & Reconstructive Surgery

## 2025-05-16 ENCOUNTER — OFFICE VISIT (OUTPATIENT)
Age: 50
End: 2025-05-16
Payer: COMMERCIAL

## 2025-05-16 DIAGNOSIS — Z48.89 ENCOUNTER FOR FOLLOW-UP CARE INVOLVING PLASTIC SURGERY: Primary | ICD-10-CM

## 2025-05-16 PROCEDURE — 99214 OFFICE O/P EST MOD 30 MIN: CPT | Performed by: PHYSICIAN ASSISTANT

## 2025-05-16 NOTE — PROGRESS NOTES
Patient Identification: Stacey Vlilatoro is a 50 y.o. female     History of Present Illness: The patient is a 50 y.o.  year-old female  who presents to the office for a follow up visit. Patient is 239 days s/p Bilateral first stage breast reconstruction and placement of submuscular [place] textured 750 mL Yoel tissue expanders, initial fill 100 mL, reinforcement of inferior pole with Flex HD acellular dermal matrix, bilateral pectoralis block with Exparel, right sided axillary advancement flap, 15 cm x 10 cm, placement of bilateral lori none DME negative pressure wound therapy dressings, less than 50 cm² each, total less than 50 cm² on 9/19/2024.    She completed 4 cycles of adjuvant chemotherapy with docetaxel and cyclophosphamide on 1/2/2025. Finished RT to right chest on 2/26/25.   She is currently taking abemaciclib and anastrozole which she will be taking for several years.      Pt is doing well today, denies wounds, fevers, chills, drainage, sign of infection or significant pain. Wearing bra.  She offers no complaints today. Desires implant based recon. She is here to continue tissue expansions.     Past Medical History:   Diagnosis Date    Arthritis     BRCA1 negative     BRCA2 negative     Breast cancer (HCC)       Patient Active Problem List   Diagnosis    Bilateral primary osteoarthritis of knee    BMI 30.0-30.9,adult    Malignant neoplasm of lower-outer quadrant of right breast of female, estrogen receptor positive (HCC)    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)    Status post bilateral breast reconstruction    Status post bilateral mastectomy    Chemotherapy induced neutropenia (HCC)       Current Outpatient Medications:     Abemaciclib (Verzenio) 100 MG TABS, Take 100 mg by mouth 2 (two) times a day, Disp: 56 tablet, Rfl: 5    ergocalciferol (VITAMIN D2) 50,000 units, Take 1 capsule (50,000 Units total) by mouth once a week for 8 doses, Disp: 8 capsule, Rfl: 0    loratadine  (CLARITIN) 10 mg tablet, Take 10 mg by mouth as needed for allergies, Disp: , Rfl:     ondansetron (ZOFRAN) 8 mg tablet, Take 1 tablet (8 mg total) by mouth every 8 (eight) hours as needed for nausea or vomiting, Disp: 20 tablet, Rfl: 3    anastrozole (ARIMIDEX) 1 mg tablet, Take 1 tablet (1 mg total) by mouth daily (Patient not taking: Reported on 2025), Disp: 30 tablet, Rfl: 11    cyclobenzaprine (FLEXERIL) 10 mg tablet, Take 1 tablet (10 mg total) by mouth 3 (three) times a day as needed for muscle spasms (Patient not taking: Reported on 2025), Disp: 30 tablet, Rfl: 0    dexamethasone (DECADRON) 4 mg tablet, Take 2 tablets (8 mg total) by mouth 2 (two) times a day with meals START DAY BEFORE CHEMO DAY OF CHEMO AND DAY AFTER CHEMO EVERY 3 WEEKS FOR FOUR CYCLES (Patient not taking: Reported on 2025), Disp: 12 tablet, Rfl: 3    oxyCODONE (Roxicodone) 5 immediate release tablet, Take 1 tablet (5 mg total) by mouth every 6 (six) hours as needed for severe pain Max Daily Amount: 20 mg (Patient not taking: Reported on 2025), Disp: 10 tablet, Rfl: 0    Past Surgical History:   Procedure Laterality Date    BREAST BIOPSY Right 2024     SECTION      x2    FOOT SURGERY      MAMMO NEEDLE LOCALIZATION LEFT (ALL INC) Left 9/3/2024    MRI BREAST BIOPSY LEFT (ALL INCLUSIVE) Left 2024    MRI BREAST BIOPSY RIGHT (ALL INCLUSIVE) Right 2024    ND MAST MODF RAD W/AX LYMPH NOD W/WO PECT/AMALIA MIN Right 2024    Procedure: RIGHT BREAST MODIFIED RADICAL MASTECTOMY LEVEL I AND II LYMPH NODE DISSECTION;  Surgeon: Mert Brink MD;  Location: [facility];  Service: Surgical Oncology    ND MASTECTOMY SIMPLE COMPLETE Left 2024    Procedure: LEFT MASTECTOMY, SAMY CLIPS IN THE RIGHT BREAST/AXILLARY LYMPH NODE AND LEFT BREAST;  Surgeon: Mert Brink MD;  [facility];  Service: Surgical Oncology    ND TISSUE EXPANDER PLACEMENT BREAST RECONSTRUCTION Bilateral  9/19/2024    Procedure: FIRST STAGE BILATERAL BREAST RECONSTRUCTION WITH TISSUE EXPANDERS AND ADM;  Surgeon: Jonathan Archibald MD;  [facility];  Service: Plastics    US GUIDED BREAST BIOPSY RIGHT COMPLETE Right 7/11/2024    US GUIDED BREAST LYMPH NODE BIOPSY RIGHT Right 7/11/2024     Social History     Tobacco Use    Smoking status: Never     Passive exposure: Never    Smokeless tobacco: Never   Substance Use Topics    Alcohol use: Not Currently     Comment: Very rarely, 1 or 2 beers if i do.     There were no vitals filed for this visit.      Physical Exam  General: NAD, alert  Breasts: Bilateral incisions are clean, dry, and intact. There is no evidence of hematoma or seroma formation.  There is no surrounding erythema, wound breakdown, drainage, or signs of infection. Expected radiation changes to right chest wall.       TE Fill  Procedures:   Under sterile conditions the bilateral tissue expanders were percutaneously accessed without difficulty and 50 ml of NS was injected into the right and 50 ml of NS into the left.  Pt tolerated procedure well.     A ''time out'' was initiated prior to procedure. Non-OR time Out:  Time out was initiated under direction and supervision of provider Casey Taylor PA-C  Correct patient identity - Yes  Correct side and site - Yes  Procedure matches verbalized consent -Yes  Correct patient position - Yes  Availability of correct implants and any special equipment or special requirements - Yes  Time out occurred prior to procedure start - Yes     Total TE volume:   Right: 750/750 ml   Left: 750/750 ml      Assessment and Plan: 50 y.o.  year-old female s/p Right Breast Modified Radical Mastectomy Level I And Ii Lymph Node Dissection - Right, Left Mastectomy, Sheron Clips In The Right Breast/axillary Lymph Node And Left Breast - Left, and First Stage Bilateral Breast Reconstruction With Tissue Expanders And Adm - Bilateral       -Recommend vaseline/aquaphor to breast  incisions daily.   -Patient not interested in PT at this time.  -Tissue expansions complete.  Discussed patient with Dr. Archibald.  Will plan for tissue expander to permanent implant exchange in the fall.  Pink sheet completed today.  - Pt still desires implant based reconstruction - she is aware we have to wait 6-12 months from the end of radiation before next surgery.  -The patient is to call the office with any questions or concerns. All of the patient's questions were answered at this time and they agree with the plan of care.      I have spent a total time of 30 minutes in caring for this patient on the day of the visit/encounter including Risks and benefits of tx options, Instructions for management, Patient and family education, Impressions, Counseling / Coordination of care, Documenting in the medical record, Obtaining or reviewing history  , and Communicating with other healthcare professionals .      Casey Taylor PA-C  Plastic & Reconstructive Surgery

## 2025-05-19 DIAGNOSIS — Z17.0 MALIGNANT NEOPLASM OF LOWER-OUTER QUADRANT OF RIGHT BREAST OF FEMALE, ESTROGEN RECEPTOR POSITIVE (HCC): ICD-10-CM

## 2025-05-19 DIAGNOSIS — C50.511 MALIGNANT NEOPLASM OF LOWER-OUTER QUADRANT OF RIGHT BREAST OF FEMALE, ESTROGEN RECEPTOR POSITIVE (HCC): ICD-10-CM

## 2025-05-19 RX ORDER — ANASTROZOLE 1 MG/1
1 TABLET ORAL DAILY
Qty: 90 TABLET | Refills: 3 | Status: SHIPPED | OUTPATIENT
Start: 2025-05-19 | End: 2025-05-19 | Stop reason: SDUPTHER

## 2025-05-19 RX ORDER — ANASTROZOLE 1 MG/1
1 TABLET ORAL DAILY
Qty: 90 TABLET | Refills: 3 | Status: SHIPPED | OUTPATIENT
Start: 2025-05-19

## 2025-05-22 ENCOUNTER — APPOINTMENT (OUTPATIENT)
Dept: LAB | Facility: CLINIC | Age: 50
End: 2025-05-22
Payer: COMMERCIAL

## 2025-05-22 DIAGNOSIS — Z17.0 MALIGNANT NEOPLASM OF LOWER-OUTER QUADRANT OF RIGHT BREAST OF FEMALE, ESTROGEN RECEPTOR POSITIVE (HCC): ICD-10-CM

## 2025-05-22 DIAGNOSIS — E83.42 HYPOMAGNESEMIA: ICD-10-CM

## 2025-05-22 DIAGNOSIS — C50.511 MALIGNANT NEOPLASM OF LOWER-OUTER QUADRANT OF RIGHT BREAST OF FEMALE, ESTROGEN RECEPTOR POSITIVE (HCC): ICD-10-CM

## 2025-05-22 LAB
ALBUMIN SERPL BCG-MCNC: 4.2 G/DL (ref 3.5–5)
ALP SERPL-CCNC: 61 U/L (ref 34–104)
ALT SERPL W P-5'-P-CCNC: 14 U/L (ref 7–52)
ANION GAP SERPL CALCULATED.3IONS-SCNC: 6 MMOL/L (ref 4–13)
AST SERPL W P-5'-P-CCNC: 17 U/L (ref 13–39)
BASOPHILS # BLD AUTO: 0.04 THOUSANDS/ÂΜL (ref 0–0.1)
BASOPHILS NFR BLD AUTO: 3 % (ref 0–1)
BILIRUB SERPL-MCNC: 0.57 MG/DL (ref 0.2–1)
BUN SERPL-MCNC: 11 MG/DL (ref 5–25)
CALCIUM SERPL-MCNC: 9.5 MG/DL (ref 8.4–10.2)
CHLORIDE SERPL-SCNC: 106 MMOL/L (ref 96–108)
CO2 SERPL-SCNC: 28 MMOL/L (ref 21–32)
CREAT SERPL-MCNC: 0.81 MG/DL (ref 0.6–1.3)
EOSINOPHIL # BLD AUTO: 0.1 THOUSAND/ÂΜL (ref 0–0.61)
EOSINOPHIL NFR BLD AUTO: 7 % (ref 0–6)
ERYTHROCYTE [DISTWIDTH] IN BLOOD BY AUTOMATED COUNT: 15.4 % (ref 11.6–15.1)
GFR SERPL CREATININE-BSD FRML MDRD: 86 ML/MIN/1.73SQ M
GLUCOSE P FAST SERPL-MCNC: 87 MG/DL (ref 65–99)
HCT VFR BLD AUTO: 34 % (ref 34.8–46.1)
HGB BLD-MCNC: 12.1 G/DL (ref 11.5–15.4)
IMM GRANULOCYTES # BLD AUTO: 0 THOUSAND/UL (ref 0–0.2)
IMM GRANULOCYTES NFR BLD AUTO: 0 % (ref 0–2)
LYMPHOCYTES # BLD AUTO: 0.41 THOUSANDS/ÂΜL (ref 0.6–4.47)
LYMPHOCYTES NFR BLD AUTO: 27 % (ref 14–44)
MAGNESIUM SERPL-MCNC: 2.1 MG/DL (ref 1.9–2.7)
MCH RBC QN AUTO: 33.3 PG (ref 26.8–34.3)
MCHC RBC AUTO-ENTMCNC: 35.6 G/DL (ref 31.4–37.4)
MCV RBC AUTO: 94 FL (ref 82–98)
MONOCYTES # BLD AUTO: 0.19 THOUSAND/ÂΜL (ref 0.17–1.22)
MONOCYTES NFR BLD AUTO: 12 % (ref 4–12)
NEUTROPHILS # BLD AUTO: 0.79 THOUSANDS/ÂΜL (ref 1.85–7.62)
NEUTS SEG NFR BLD AUTO: 51 % (ref 43–75)
NRBC BLD AUTO-RTO: 0 /100 WBCS
PLATELET # BLD AUTO: 142 THOUSANDS/UL (ref 149–390)
PMV BLD AUTO: 10.2 FL (ref 8.9–12.7)
POTASSIUM SERPL-SCNC: 4.3 MMOL/L (ref 3.5–5.3)
PROT SERPL-MCNC: 6.9 G/DL (ref 6.4–8.4)
RBC # BLD AUTO: 3.63 MILLION/UL (ref 3.81–5.12)
SODIUM SERPL-SCNC: 140 MMOL/L (ref 135–147)
WBC # BLD AUTO: 1.53 THOUSAND/UL (ref 4.31–10.16)

## 2025-05-22 PROCEDURE — 80053 COMPREHEN METABOLIC PANEL: CPT

## 2025-05-22 PROCEDURE — 36415 COLL VENOUS BLD VENIPUNCTURE: CPT

## 2025-05-22 PROCEDURE — 85025 COMPLETE CBC W/AUTO DIFF WBC: CPT

## 2025-05-22 PROCEDURE — 83735 ASSAY OF MAGNESIUM: CPT

## 2025-06-02 ENCOUNTER — HOSPITAL ENCOUNTER (OUTPATIENT)
Dept: INFUSION CENTER | Facility: CLINIC | Age: 50
Discharge: HOME | End: 2025-06-02
Payer: COMMERCIAL

## 2025-06-02 DIAGNOSIS — C50.511 MALIGNANT NEOPLASM OF LOWER-OUTER QUADRANT OF RIGHT BREAST OF FEMALE, ESTROGEN RECEPTOR POSITIVE (HCC): Primary | ICD-10-CM

## 2025-06-02 DIAGNOSIS — Z17.0 MALIGNANT NEOPLASM OF LOWER-OUTER QUADRANT OF RIGHT BREAST OF FEMALE, ESTROGEN RECEPTOR POSITIVE (HCC): Primary | ICD-10-CM

## 2025-06-02 PROCEDURE — 96402 CHEMO HORMON ANTINEOPL SQ/IM: CPT

## 2025-06-02 RX ADMIN — LEUPROLIDE ACETATE 7.5 MG: KIT at 09:34

## 2025-06-02 NOTE — PROGRESS NOTES
Pt into clinic for lurpon injection. Pt offers no complaints.     Tolerated injection in L ventrogluteal without reaction. .     Pt aware of next appointment on 6/30/25 at 9:30am. AVS declined.

## 2025-06-07 DIAGNOSIS — E55.9 VITAMIN D DEFICIENCY: ICD-10-CM

## 2025-06-09 RX ORDER — ERGOCALCIFEROL 1.25 MG/1
CAPSULE, LIQUID FILLED ORAL
Qty: 4 CAPSULE | Refills: 1 | OUTPATIENT
Start: 2025-06-09

## 2025-06-16 ENCOUNTER — TELEPHONE (OUTPATIENT)
Age: 50
End: 2025-06-16

## 2025-06-24 ENCOUNTER — TELEPHONE (OUTPATIENT)
Age: 50
End: 2025-06-24

## 2025-06-24 NOTE — TELEPHONE ENCOUNTER
Called pt and left message with my direct phone number, for pt to schedule surgery with Dr. Archibald.

## 2025-06-25 ENCOUNTER — PREP FOR PROCEDURE (OUTPATIENT)
Age: 50
End: 2025-06-25

## 2025-06-25 DIAGNOSIS — Z85.3 PERSONAL HISTORY OF BREAST CANCER: Primary | ICD-10-CM

## 2025-06-28 ENCOUNTER — APPOINTMENT (OUTPATIENT)
Dept: LAB | Facility: CLINIC | Age: 50
End: 2025-06-28
Payer: COMMERCIAL

## 2025-06-28 DIAGNOSIS — E83.42 HYPOMAGNESEMIA: ICD-10-CM

## 2025-06-28 DIAGNOSIS — Z17.0 MALIGNANT NEOPLASM OF LOWER-OUTER QUADRANT OF RIGHT BREAST OF FEMALE, ESTROGEN RECEPTOR POSITIVE (HCC): ICD-10-CM

## 2025-06-28 DIAGNOSIS — C50.511 MALIGNANT NEOPLASM OF LOWER-OUTER QUADRANT OF RIGHT BREAST OF FEMALE, ESTROGEN RECEPTOR POSITIVE (HCC): ICD-10-CM

## 2025-06-28 LAB
ALBUMIN SERPL BCG-MCNC: 4.4 G/DL (ref 3.5–5)
ALP SERPL-CCNC: 68 U/L (ref 34–104)
ALT SERPL W P-5'-P-CCNC: 32 U/L (ref 7–52)
ANION GAP SERPL CALCULATED.3IONS-SCNC: 8 MMOL/L (ref 4–13)
AST SERPL W P-5'-P-CCNC: 29 U/L (ref 13–39)
BASOPHILS # BLD AUTO: 0.08 THOUSANDS/ÂΜL (ref 0–0.1)
BASOPHILS NFR BLD AUTO: 4 % (ref 0–1)
BILIRUB SERPL-MCNC: 0.49 MG/DL (ref 0.2–1)
BUN SERPL-MCNC: 11 MG/DL (ref 5–25)
CALCIUM SERPL-MCNC: 9.3 MG/DL (ref 8.4–10.2)
CHLORIDE SERPL-SCNC: 107 MMOL/L (ref 96–108)
CO2 SERPL-SCNC: 27 MMOL/L (ref 21–32)
CREAT SERPL-MCNC: 0.79 MG/DL (ref 0.6–1.3)
EOSINOPHIL # BLD AUTO: 0.13 THOUSAND/ÂΜL (ref 0–0.61)
EOSINOPHIL NFR BLD AUTO: 7 % (ref 0–6)
ERYTHROCYTE [DISTWIDTH] IN BLOOD BY AUTOMATED COUNT: 13.6 % (ref 11.6–15.1)
GFR SERPL CREATININE-BSD FRML MDRD: 88 ML/MIN/1.73SQ M
GLUCOSE P FAST SERPL-MCNC: 85 MG/DL (ref 65–99)
HCT VFR BLD AUTO: 38.4 % (ref 34.8–46.1)
HGB BLD-MCNC: 13 G/DL (ref 11.5–15.4)
IMM GRANULOCYTES # BLD AUTO: 0 THOUSAND/UL (ref 0–0.2)
IMM GRANULOCYTES NFR BLD AUTO: 0 % (ref 0–2)
LYMPHOCYTES # BLD AUTO: 0.4 THOUSANDS/ÂΜL (ref 0.6–4.47)
LYMPHOCYTES NFR BLD AUTO: 20 % (ref 14–44)
MAGNESIUM SERPL-MCNC: 2.4 MG/DL (ref 1.9–2.7)
MCH RBC QN AUTO: 34.1 PG (ref 26.8–34.3)
MCHC RBC AUTO-ENTMCNC: 33.9 G/DL (ref 31.4–37.4)
MCV RBC AUTO: 101 FL (ref 82–98)
MONOCYTES # BLD AUTO: 0.25 THOUSAND/ÂΜL (ref 0.17–1.22)
MONOCYTES NFR BLD AUTO: 13 % (ref 4–12)
NEUTROPHILS # BLD AUTO: 1.14 THOUSANDS/ÂΜL (ref 1.85–7.62)
NEUTS SEG NFR BLD AUTO: 56 % (ref 43–75)
NRBC BLD AUTO-RTO: 0 /100 WBCS
PLATELET # BLD AUTO: 150 THOUSANDS/UL (ref 149–390)
PMV BLD AUTO: 9.7 FL (ref 8.9–12.7)
POTASSIUM SERPL-SCNC: 4.6 MMOL/L (ref 3.5–5.3)
PROT SERPL-MCNC: 7.1 G/DL (ref 6.4–8.4)
RBC # BLD AUTO: 3.81 MILLION/UL (ref 3.81–5.12)
SODIUM SERPL-SCNC: 142 MMOL/L (ref 135–147)
WBC # BLD AUTO: 2 THOUSAND/UL (ref 4.31–10.16)

## 2025-06-28 PROCEDURE — 36415 COLL VENOUS BLD VENIPUNCTURE: CPT

## 2025-06-28 PROCEDURE — 80053 COMPREHEN METABOLIC PANEL: CPT

## 2025-06-28 PROCEDURE — 83735 ASSAY OF MAGNESIUM: CPT

## 2025-06-28 PROCEDURE — 85025 COMPLETE CBC W/AUTO DIFF WBC: CPT

## 2025-06-30 ENCOUNTER — HOSPITAL ENCOUNTER (OUTPATIENT)
Dept: INFUSION CENTER | Facility: CLINIC | Age: 50
Discharge: HOME | End: 2025-06-30
Payer: COMMERCIAL

## 2025-06-30 DIAGNOSIS — Z17.0 MALIGNANT NEOPLASM OF LOWER-OUTER QUADRANT OF RIGHT BREAST OF FEMALE, ESTROGEN RECEPTOR POSITIVE (HCC): Primary | ICD-10-CM

## 2025-06-30 DIAGNOSIS — C50.511 MALIGNANT NEOPLASM OF LOWER-OUTER QUADRANT OF RIGHT BREAST OF FEMALE, ESTROGEN RECEPTOR POSITIVE (HCC): Primary | ICD-10-CM

## 2025-06-30 PROCEDURE — 96402 CHEMO HORMON ANTINEOPL SQ/IM: CPT

## 2025-06-30 RX ADMIN — LEUPROLIDE ACETATE 7.5 MG: KIT at 09:38

## 2025-06-30 NOTE — PROGRESS NOTES
Patient here for Lupron injection. Offers no complaints, denies antibiotics.   Tolerated injection to the R ventrogluteal without reaction.   Aware of next appointment for 7/28/25 @ 0930 am   Walked out of clinic with no incident.

## 2025-07-08 PROBLEM — Z79.811 USE OF ANASTROZOLE: Status: ACTIVE | Noted: 2025-07-08

## 2025-07-09 ENCOUNTER — OFFICE VISIT (OUTPATIENT)
Dept: SURGICAL ONCOLOGY | Facility: CLINIC | Age: 50
End: 2025-07-09
Payer: COMMERCIAL

## 2025-07-09 VITALS
OXYGEN SATURATION: 99 % | BODY MASS INDEX: 27.38 KG/M2 | HEART RATE: 86 BPM | TEMPERATURE: 97.9 F | DIASTOLIC BLOOD PRESSURE: 88 MMHG | SYSTOLIC BLOOD PRESSURE: 118 MMHG | HEIGHT: 63 IN | WEIGHT: 154.5 LBS

## 2025-07-09 DIAGNOSIS — C77.3 CARCINOMA OF RIGHT BREAST METASTATIC TO AXILLARY LYMPH NODE (HCC): ICD-10-CM

## 2025-07-09 DIAGNOSIS — Z98.890 HISTORY OF BREAST RECONSTRUCTION: ICD-10-CM

## 2025-07-09 DIAGNOSIS — C50.511 MALIGNANT NEOPLASM OF LOWER-OUTER QUADRANT OF RIGHT BREAST OF FEMALE, ESTROGEN RECEPTOR POSITIVE (HCC): Primary | ICD-10-CM

## 2025-07-09 DIAGNOSIS — Z90.13 HISTORY OF BILATERAL MASTECTOMY: ICD-10-CM

## 2025-07-09 DIAGNOSIS — Z79.811 USE OF ANASTROZOLE: ICD-10-CM

## 2025-07-09 DIAGNOSIS — Z17.0 MALIGNANT NEOPLASM OF LOWER-OUTER QUADRANT OF RIGHT BREAST OF FEMALE, ESTROGEN RECEPTOR POSITIVE (HCC): Primary | ICD-10-CM

## 2025-07-09 DIAGNOSIS — C50.911 CARCINOMA OF RIGHT BREAST METASTATIC TO AXILLARY LYMPH NODE (HCC): ICD-10-CM

## 2025-07-09 PROCEDURE — 99214 OFFICE O/P EST MOD 30 MIN: CPT | Performed by: SURGERY

## 2025-07-09 NOTE — PROGRESS NOTES
Name: Stacey Villatoro      : 1975      MRN: [id number]  Encounter Provider: Mert Brink MD  Encounter Date: 2025   Encounter department: [facility]  :  Assessment & Plan  Malignant neoplasm of lower-outer quadrant of right breast of female, estrogen receptor positive (HCC)         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)         History of bilateral mastectomy         History of breast reconstruction         Use of anastrozole         50-year-old female with history of right breast cancer status post right modified radical mastectomy with left mastectomy with tissue expander reconstruction and post radiation.  She is on anastrozole and tolerating well.  No major side effects such as shortness of breath leg swelling or vaginal spotting.  She is up-to-date with gynecology evaluation gastroenterology and bone DEXA scan.  At today's visit I do not appreciate local regional recurrence.  Tentatively plan for implant replacement by end of this year.  I will see her in 6 months.  We did discuss the benefit alternative and possible complications of anastrozole as well.  She was told if any major side effects to call us or medical oncology team.  She understand and agreed to do so.  Survivorship  Discussed symptoms related to disease recurrence, Yes     Evaluated for late effects related to cancer treatment, Yes     Screening current for cervical cancer, Yes     Screening current for colon cancer, Yes     Cancer rehabilitation services addressed, Yes     Screening current for osteoporosis, Yes     Oncology Treatment Summary reviewed with patient and copy provided, Yes     Referral placed for psychosocial evaluation/screening to oncology social work  No    History of Present Illness   Stacey Villatoro is a 50 y.o. year old female who presents with right breast cancer and metastasis to axillary lymph nodes postmastectomy, radiation  and adjuvant therapy.     Oncology History   Cancer Staging   Malignant neoplasm of lower-outer quadrant of right breast of female, estrogen receptor positive (HCC)  Staging form: Breast, AJCC 8th Edition  - Pathologic stage from 9/19/2024: Stage IB (pT2, pN2a, cM0, G2, ER+, ND+, HER2-, Oncotype DX score: 20) - Signed by Ros Hyman MD on 9/26/2024  Stage prefix: Initial diagnosis  Multigene prognostic tests performed: Oncotype DX  Recurrence score range: Greater than or equal to 11  Histologic grading system: 3 grade system  Oncology History   Malignant neoplasm of lower-outer quadrant of right breast of female, estrogen receptor positive (HCC)   7/11/2024 Biopsy    Right breast ultrasound guided biopsy  A. 7 o'clock 7 cm from nipple  Invasive breast carcinoma of no special type (ductal NST/invasive ductal carcinoma)   Grade 2  ER 95, ND 61, HER2 1+  No lymphovascular invasion    B. Right axillary lymph node  Invasive ductal carcinoma of mammary origin, involving fibroadipose tissue, see comment.   Lymphoid tissue is not identified.    C. Right axillary lymph node  Invasive ductal carcinoma of mammary origin, involving fibroadipose tissue, see comment.   Lymphoid tissue is not identified.    Right malignancy appears unifocal. The biopsy-proven carcinoma measured 0.6 cm on ultrasound. There is metastatic disease to a right axillary lymph node.  A second abnormal appearing lymph node is noted in the right axilla (not biopsied). 6/18/2024 left breast mammo negative.     7/18/2024 Genetic Testing    NEGATIVE: No Clinically Significant Variants Detected  [place] - A total of 59 genes were evaluated with RNA insight: APC, RICKY, BAP1, BARD1, BMPR1A, BRCA1, BRCA2, BRIP1, CDH1, CDK4, CDKN1B, CDKN2A, CHEK2, DICER1, FH, FLCN, KIF1B, MAX, MEN1, MET, MLH1, MSH2, MSH6, MUTYH, NF1, NTHL1, PALB2, PMS2, POT1, PTEN, RAD51C, RAD51D, RB1, RET, SDHA, SDHAF2, SDHB, SDHC, SDHD, SMAD4, SMARCA4, STK11, ODJA325, TP53, TSC1, TSC2 and VHL  (sequencing and deletion/duplication); AXIN2, CTNNA1, EGLN1, HOXB13, KIT, MITF, MSH3, PDGFRA, POLD1 and POLE (sequencing only); EPCAM and GREM1 (deletion/duplication only).     7/19/2024 Observation    Bilateral Breast MRI IMPRESSION:  Continued surgical and oncologic management is recommended for the biopsy proven malignancy of the right breast corresponding to a 3.4 cm mass at 7:00 in the right breast and the biopsy proven metastatic right axillary lymph node. There are approximately 4 enlarged right level 1 lymph nodes. No internal mammary adenopathy.      Indeterminate mass at 12:00 and non-mass enhancement at 9:00 in the right breast. If patient is pursuing breast conservation therapy, two site MRI guided biopsy of the right breast is recommended.      Indeterminate non-mass enhancement at 7:00 in the left breast. MRI guided biopsy is recommended.      Of note, bilateral biopsies cannot be preformed on the same day as the left side will need a medial approach.      8/9/2024 Biopsy    Left breast MRI-guided biopsy  7 o'clock  Lobular neoplasia (ALH and LCIS)    Concordant. Area of non-mass enhancement measured 8 mm on MRI.     8/16/2024 Biopsy    Right breast MRI-guided biopsy  A. 12 o'clock  Unremarkable ductules and lobules in a background of dense stromal fibrosis  Focal changes suggestive for prior biopsy  Negative for atypia or carcinoma    B. 10 o'clock  Focus of columnar cell change and columnar cell hyperplasia with calcifications  Negative for atypia or carcinoma    Concordant. Continued surgical management for known ipsilateral breast carcinoma recommended.     9/19/2024 Surgery    Right modified radical mastectomy left simple mastectomy immediate reconstruction with Dr. Archibald  RIGHT  Invasive breast carcinoma of no special type (ductal NST / invasive ductal carcinoma)   34 mm  Separate tumor nodule, 11 mm, most compatible with completely replaced lymph node   Grade 2   Invasive carcinoma less than 1  mm from posterior margin   Subdermal invasion by tumor. Perineural invasion by tumor  5/12 Lymph nodes; positive for extranodal extension  Anatomic stage: at least stage IIIA  Prognostic stage: IB    LEFT  Breast tissue with non-invasive lobular neoplasia (ALH / LCIS) and atypical ductal hyperplasia (ADH)      9/19/2024 -  Cancer Staged    Staging form: Breast, AJCC 8th Edition  - Pathologic stage from 9/19/2024: Stage IB (pT2, pN2a, cM0, G2, ER+, [place]+, HER2-, Oncotype DX score: 20) - Signed by Ros Hyman MD on 9/26/2024  Stage prefix: Initial diagnosis  Multigene prognostic tests performed: Oncotype DX  Recurrence score range: Greater than or equal to 11  Histologic grading system: 3 grade system       10/31/2024 -  Chemotherapy    alteplase (CATHFLO), 2 mg, Intracatheter, Every 1 Minute as needed, 4 of 4 cycles  pegfilgrastim (NEULASTA ONPRO), 6 mg, Subcutaneous, Once, 4 of 4 cycles  Administration: 6 mg (10/31/2024), 6 mg (11/21/2024), 6 mg (12/12/2024)  cyclophosphamide (CYTOXAN) IVPB, 1,020 mg, Intravenous, Once, 4 of 4 cycles  Administration: 1,000 mg (10/31/2024), 1,000 mg (11/21/2024), 1,000 mg (12/12/2024)  DOCEtaxel (TAXOTERE) chemo infusion, 75 mg/m2 = 127.6 mg, Intravenous, Once, 4 of 4 cycles  Administration: 127.6 mg (10/31/2024), 127.6 mg (11/21/2024), 127.6 mg (12/12/2024)     1/22/2025 - 2/26/2025 Radiation    Treatments:  Course: C1  Plan ID Energy Fractions Dose per Fraction (cGy) Dose Correction (cGy) Total Dose Delivered (cGy) Elapsed Days   RtCW_CNI 10X/6X 25 / 25 200 0 5,000 35    Treatment Dates:  1/22/2025 - 2/26/2025.      3/2025 -  Hormone Therapy    Anastrozole 1mg  Dr. Hyamn     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)   9/19/2024 Surgery    Right modified radical mastectomy left simple mastectomy immediate reconstruction with Dr. Archibald  RIGHT  Invasive breast carcinoma of no special type (ductal NST / invasive ductal carcinoma)   34 mm  Separate tumor nodule,  "11 mm, most compatible with completely replaced lymph node   Grade 2   Invasive carcinoma less than 1 mm from posterior margin   Subdermal invasion by tumor. Perineural invasion by tumor  5/12 Lymph nodes; positive for extranodal extension  Anatomic stage: at least stage IIIA  Prognostic stage: IB    LEFT  Breast tissue with non-invasive lobular neoplasia (ALH / LCIS) and atypical ductal hyperplasia (ADH)      1/22/2025 - 2/26/2025 Radiation    Treatments:  Course: C1  Plan ID Energy Fractions Dose per Fraction (cGy) Dose Correction (cGy) Total Dose Delivered (cGy) Elapsed Days   RtCW_CNI 10X/6X 25 / 25 200 0 5,000 35    Treatment Dates:  1/22/2025 - 2/26/2025.         Review of Systems   Constitutional:  Negative for chills and fever.   HENT:  Negative for ear pain and sore throat.    Eyes:  Negative for pain and visual disturbance.   Respiratory:  Negative for cough and shortness of breath.    Cardiovascular:  Negative for chest pain and palpitations.   Gastrointestinal:  Negative for abdominal pain and vomiting.   Genitourinary:  Negative for dysuria and hematuria.   Musculoskeletal:  Negative for arthralgias and back pain.   Skin:  Negative for color change and rash.   Neurological:  Negative for seizures and syncope.   All other systems reviewed and are negative.   A complete review of systems is negative other than that noted above in the HPI.    Medical History Reviewed by provider this encounter:  Tobacco  Allergies  Meds  Problems  Med Hx  Surg Hx  Fam Hx     .       Objective   /88 (BP Location: Left arm, Patient Position: Sitting)   Pulse 86   Temp 97.9 °F (36.6 °C) (Temporal)   Ht 5' 3\" (1.6 m)   Wt 70.1 kg (154 lb 8 oz)   SpO2 99%   BMI 27.37 kg/m²     Pain Screening:     ECOG    Physical Exam  Constitutional:       Appearance: Normal appearance.   HENT:      Head: Normocephalic and atraumatic.      Nose: Nose normal.      Mouth/Throat:      Mouth: Mucous membranes are moist. "     Eyes:      Pupils: Pupils are equal, round, and reactive to light.       Cardiovascular:      Rate and Rhythm: Normal rate.      Pulses: Normal pulses.      Heart sounds: Normal heart sounds.   Pulmonary:      Effort: Pulmonary effort is normal.      Breath sounds: Normal breath sounds.   Chest:        Comments: Bilateral mastectomy flaps are clean intact expander signed placed.  Bilateral axillary and supraclavicular examination no palpable adenopathy.  Patient was examined seated as well as supine position.  No evidence of local regional recurrence.  Abdominal:      General: Bowel sounds are normal.      Palpations: Abdomen is soft.     Musculoskeletal:         General: Normal range of motion.      Cervical back: Normal range of motion and neck supple.     Skin:     General: Skin is warm.     Neurological:      General: No focal deficit present.      Mental Status: She is alert and oriented to person, place, and time.     Psychiatric:         Mood and Affect: Mood normal.         Behavior: Behavior normal.         Thought Content: Thought content normal.         Judgment: Judgment normal.          Labs: I have reviewed pertinent labs.   Lab Results   Component Value Date/Time    WBC 2.00 (L) 06/28/2025 07:22 AM    RBC 3.81 06/28/2025 07:22 AM    Hemoglobin 13.0 06/28/2025 07:22 AM    Hematocrit 38.4 06/28/2025 07:22 AM     (H) 06/28/2025 07:22 AM    MCH 34.1 06/28/2025 07:22 AM    RDW 13.6 06/28/2025 07:22 AM    Platelets 150 06/28/2025 07:22 AM    Segmented % 56 06/28/2025 07:22 AM    Lymphocytes % 20 06/28/2025 07:22 AM    Monocytes % 13 (H) 06/28/2025 07:22 AM    Eosinophils Relative 7 (H) 06/28/2025 07:22 AM    Basophils Relative 4 (H) 06/28/2025 07:22 AM    Immature Grans % 0 06/28/2025 07:22 AM    Absolute Neutrophils 1.14 (L) 06/28/2025 07:22 AM      Lab Results   Component Value Date/Time    Sodium 142 06/28/2025 07:22 AM    Potassium 4.6 06/28/2025 07:22 AM    Chloride 107 06/28/2025 07:22 AM     CO2 27 06/28/2025 07:22 AM    ANION GAP 8 06/28/2025 07:22 AM    BUN 11 06/28/2025 07:22 AM    Creatinine 0.79 06/28/2025 07:22 AM    Glucose 89 03/21/2025 09:31 AM    Glucose, Fasting 85 06/28/2025 07:22 AM    Calcium 9.3 06/28/2025 07:22 AM    AST 29 06/28/2025 07:22 AM    ALT 32 06/28/2025 07:22 AM    Alkaline Phosphatase 68 06/28/2025 07:22 AM    Total Protein 7.1 06/28/2025 07:22 AM    Albumin 4.4 06/28/2025 07:22 AM    Total Bilirubin 0.49 06/28/2025 07:22 AM    eGFR 88 06/28/2025 07:22 AM      Appointment on 06/28/2025   Component Date Value Ref Range Status    WBC 06/28/2025 2.00 (L)  4.31 - 10.16 Thousand/uL Final    RBC 06/28/2025 3.81  3.81 - 5.12 Million/uL Final    Hemoglobin 06/28/2025 13.0  11.5 - 15.4 g/dL Final    Hematocrit 06/28/2025 38.4  34.8 - 46.1 % Final    MCV 06/28/2025 101 (H)  82 - 98 fL Final    MCH 06/28/2025 34.1  26.8 - 34.3 pg Final    MCHC 06/28/2025 33.9  31.4 - 37.4 g/dL Final    RDW 06/28/2025 13.6  11.6 - 15.1 % Final    MPV 06/28/2025 9.7  8.9 - 12.7 fL Final    Platelets 06/28/2025 150  149 - 390 Thousands/uL Final    nRBC 06/28/2025 0  /100 WBCs Final    Segmented % 06/28/2025 56  43 - 75 % Final    Immature Grans % 06/28/2025 0  0 - 2 % Final    Lymphocytes % 06/28/2025 20  14 - 44 % Final    Monocytes % 06/28/2025 13 (H)  4 - 12 % Final    Eosinophils Relative 06/28/2025 7 (H)  0 - 6 % Final    Basophils Relative 06/28/2025 4 (H)  0 - 1 % Final    Absolute Neutrophils 06/28/2025 1.14 (L)  1.85 - 7.62 Thousands/µL Final    Absolute Immature Grans 06/28/2025 0.00  0.00 - 0.20 Thousand/uL Final    Absolute Lymphocytes 06/28/2025 0.40 (L)  0.60 - 4.47 Thousands/µL Final    Absolute Monocytes 06/28/2025 0.25  0.17 - 1.22 Thousand/µL Final    Eosinophils Absolute 06/28/2025 0.13  0.00 - 0.61 Thousand/µL Final    Basophils Absolute 06/28/2025 0.08  0.00 - 0.10 Thousands/µL Final    Sodium 06/28/2025 142  135 - 147 mmol/L Final    Potassium 06/28/2025 4.6  3.5 - 5.3 mmol/L Final     Chloride 06/28/2025 107  96 - 108 mmol/L Final    CO2 06/28/2025 27  21 - 32 mmol/L Final    ANION GAP 06/28/2025 8  4 - 13 mmol/L Final    BUN 06/28/2025 11  5 - 25 mg/dL Final    Creatinine 06/28/2025 0.79  0.60 - 1.30 mg/dL Final    Standardized to IDMS reference method    Glucose, Fasting 06/28/2025 85  65 - 99 mg/dL Final    Calcium 06/28/2025 9.3  8.4 - 10.2 mg/dL Final    AST 06/28/2025 29  13 - 39 U/L Final    ALT 06/28/2025 32  7 - 52 U/L Final    Specimen collection should occur prior to Sulfasalazine administration due to the potential for falsely depressed results.     Alkaline Phosphatase 06/28/2025 68  34 - 104 U/L Final    Total Protein 06/28/2025 7.1  6.4 - 8.4 g/dL Final    Albumin 06/28/2025 4.4  3.5 - 5.0 g/dL Final    Total Bilirubin 06/28/2025 0.49  0.20 - 1.00 mg/dL Final    Use of this assay is not recommended for patients undergoing treatment with eltrombopag due to the potential for falsely elevated results.  N-acetyl-p-benzoquinone imine (metabolite of Acetaminophen) will generate erroneously low results in samples for patients that have taken an overdose of Acetaminophen.    eGFR 06/28/2025 88  ml/min/1.73sq m Final    Magnesium 06/28/2025 2.4  1.9 - 2.7 mg/dL Final     Pathology: I have reviewed pathology reports described above.    Radiology Results Review : No pertinent imaging studies reviewed.  Concordance: yes    Administrative Statements   I have spent a total time of 30 minutes in caring for this patient on the day of the visit/encounter including Prognosis, Risks and benefits of tx options, Instructions for management, Patient and family education, Importance of tx compliance, Risk factor reductions, Impressions, Counseling / Coordination of care, Documenting in the medical record, Reviewing/placing orders in the medical record (including tests, medications, and/or procedures), and Obtaining or reviewing history  .

## 2025-07-23 ENCOUNTER — OFFICE VISIT (OUTPATIENT)
Dept: HEMATOLOGY ONCOLOGY | Facility: CLINIC | Age: 50
End: 2025-07-23
Payer: COMMERCIAL

## 2025-07-23 VITALS
HEART RATE: 94 BPM | DIASTOLIC BLOOD PRESSURE: 78 MMHG | OXYGEN SATURATION: 99 % | RESPIRATION RATE: 18 BRPM | HEIGHT: 63 IN | TEMPERATURE: 98.3 F | SYSTOLIC BLOOD PRESSURE: 128 MMHG | WEIGHT: 153 LBS | BODY MASS INDEX: 27.11 KG/M2

## 2025-07-23 DIAGNOSIS — C50.511 MALIGNANT NEOPLASM OF LOWER-OUTER QUADRANT OF RIGHT BREAST OF FEMALE, ESTROGEN RECEPTOR POSITIVE (HCC): Primary | ICD-10-CM

## 2025-07-23 DIAGNOSIS — Z17.0 MALIGNANT NEOPLASM OF LOWER-OUTER QUADRANT OF RIGHT BREAST OF FEMALE, ESTROGEN RECEPTOR POSITIVE (HCC): Primary | ICD-10-CM

## 2025-07-23 PROCEDURE — 99214 OFFICE O/P EST MOD 30 MIN: CPT | Performed by: INTERNAL MEDICINE

## 2025-07-23 NOTE — ASSESSMENT & PLAN NOTE
Anatomical stage IIIa and pathological stage Ib (pT2, pN2a, cM0, G2), ER positive, OH positive, HER2/divina 1+ negative, 4 /10 involved with metastatic disease with extranodal extension. Oncotype DX recurrence score of 20.     Status post radical bilateral mastectomy on 9/19/2024.  In the left breast mastectomy was found to have noninvasive lobular neoplasia.  She completed 4 cycles of adjuvant chemotherapy with docetaxel and cyclophosphamide on 1/2/2025.  She completed her adjuvant radiation treatment in February 2025.     The patient was started on abemaciclib full dose 150 mg twice a day around the beginning of February 2025.  She unfortunately did not tolerate the full dose of abemaciclib which had to be decreased down to 100 mg twice a day due to significant diarrhea.  She stated that she is tolerating the adjusted dose of abemaciclib relatively better with manageable diarrhea.  She denied any significant symptoms related to the daily anastrozole.  She is also on Lupron injection monthly for ovarian suppression.    There is no obvious clinical hint of recurrence of her breast cancer.  She was encouraged to continue with vitamin D supplements daily.  Orders:    CBC and differential; Future    Comprehensive metabolic panel; Future    Magnesium; Future

## 2025-07-23 NOTE — PROGRESS NOTES
Name: Stacey Villatoro      : 1975      MRN: [id number]  Encounter Provider: Ros Hyman MD  Encounter Date: 2025   Encounter department: [facility] [place]  :  Assessment & Plan  Malignant neoplasm of lower-outer quadrant of right breast of female, estrogen receptor positive (HCC)  Anatomical stage IIIa and pathological stage Ib (pT2, pN2a, cM0, G2), ER positive, FL positive, HER2/divina 1+ negative, 4 /10 involved with metastatic disease with extranodal extension. Oncotype DX recurrence score of 20.     Status post radical bilateral mastectomy on 2024.  In the left breast mastectomy was found to have noninvasive lobular neoplasia.  She completed 4 cycles of adjuvant chemotherapy with docetaxel and cyclophosphamide on 2025.  She completed her adjuvant radiation treatment in 2025.     The patient was started on abemaciclib full dose 150 mg twice a day around the beginning of 2025.  She unfortunately did not tolerate the full dose of abemaciclib which had to be decreased down to 100 mg twice a day due to significant diarrhea.  She stated that she is tolerating the adjusted dose of abemaciclib relatively better with manageable diarrhea.  She denied any significant symptoms related to the daily anastrozole.  She is also on Lupron injection monthly for ovarian suppression.    There is no obvious clinical hint of recurrence of her breast cancer.  She was encouraged to continue with vitamin D supplements daily.  Orders:    CBC and differential; Future    Comprehensive metabolic panel; Future    Magnesium; Future        Return in about 3 months (around 10/23/2025) for Office Visit 20 min, Labs, Infusion.    History of Present Illness   Chief Complaint   Patient presents with    Follow-up   HPI:  This is a 49-year-old female without significant past medical history.  Family history is negative for breast cancer.  The patient apparently had her first  screening mammogram on 6/18/2024 which was read as abnormal with right breast mass at 7 o'clock position 7 cm from the nipple with suspicious lymph adenopathy in the right axilla.  She then had multiple imaging including diagnostic mammogram, ultrasound of the right breast and ultrasound-guided biopsy of the right breast mass and right axillary lymph node on 7/11/2024.  The pathology was compatible with invasive breast cancer, ER strongly +95%, OH +70%, HER2/divina negative 1+.  CT scan of the chest abdomen pelvis on 7/18/2024 was negative for any obvious signs of metastatic disease.  MRI of the breast bilaterally on 7/19/2024 showed  MPRESSION:   Continued surgical and oncologic management is recommended for the biopsy proven malignancy of the right breast corresponding to a 3.4 cm mass at 7:00 in the right breast and the biopsy proven metastatic right axillary lymph node. There are approximately 4 enlarged right level 1 lymph nodes. No internal mammary adenopathy.      Indeterminate mass at 12:00 and non-mass enhancement at 9:00 in the right breast. If patient is pursuing breast conservation therapy, two site MRI guided biopsy of the right breast is recommended.      Indeterminate non-mass enhancement at 7:00 in the left breast. MRI guided biopsy is recommended.      Biopsy from the left breast at 7 o'clock position showed lobular neoplasia( ALH & LCIS, 4 foci with 5mm largest focus) .  She also had an MRI guided biopsy of the right breast on T 12:00 and 10 o'clock position which came back negative for malignant process.     [employer] molecular screening on 6/4/2024 was negative for obvious genetic alteration.     The patient then underwent bilateral total mastectomy on 9/19/2024 which showed:  Final Diagnosis   A.  Right breast (modified radical mastectomy):     - Invasive breast carcinoma of no special type (ductal NST / invasive ductal carcinoma).      - Tumor size: 34 mm.  Tumor rdgrdrrdarddrderd:rd rd3rd of 3.      - Invasive  carcinoma less than 1 mm from posterior margin.      - Separate tumor nodule, 11 mm, most compatible with completely replaced lymph node.     - One (1) additional lymph node, negative for carcinoma.      B.  Levels 1-2, right axilla (dissection):     - Metastatic mammary carcinoma involving four (4) of ten (10) lymph nodes.      - Tumor deposits measure from 7-20 mm; positive for extranodal extension.     C.  Left breast (mastectomy):     - Breast tissue with non-invasive lobular neoplasia (ALH / LCIS) and atypical ductal hyperplasia (ADH).     - Negative for invasive carcinoma.     D.  New superior margin, right breast (excision):     - Benign skin and subcutaneous tissue.         Oncotype Dx recurrence score came back 20.        Interval history:     The patient came today for a follow-up visit.  She is tolerating the current dose of abemaciclib 100 mg twice a day with tolerable diarrhea.  She denied any other symptoms.  Blood work from 6/28/2025 was reviewed with the patient which showed white cell count of 2.0.  Hemoglobin hematocrit and platelets were within normal range.  CMP was normal.        Oncology History   Cancer Staging   Malignant neoplasm of lower-outer quadrant of right breast of female, estrogen receptor positive (HCC)  Staging form: Breast, AJCC 8th Edition  - Pathologic stage from 9/19/2024: Stage IB (pT2, pN2a, cM0, G2, ER+, IA+, HER2-, Oncotype DX score: 20) - Signed by Ros Hyman MD on 9/26/2024  Stage prefix: Initial diagnosis  Multigene prognostic tests performed: Oncotype DX  Recurrence score range: Greater than or equal to 11  Histologic grading system: 3 grade system  Oncology History   Malignant neoplasm of lower-outer quadrant of right breast of female, estrogen receptor positive (HCC)   7/11/2024 Biopsy    Right breast ultrasound guided biopsy  A. 7 o'clock 7 cm from nipple  Invasive breast carcinoma of no special type (ductal NST/invasive ductal carcinoma)   Grade 2  ER 95, IA 61,  HER2 1+  No lymphovascular invasion    B. Right axillary lymph node  Invasive ductal carcinoma of mammary origin, involving fibroadipose tissue, see comment.   Lymphoid tissue is not identified.    C. Right axillary lymph node  Invasive ductal carcinoma of mammary origin, involving fibroadipose tissue, see comment.   Lymphoid tissue is not identified.    Right malignancy appears unifocal. The biopsy-proven carcinoma measured 0.6 cm on ultrasound. There is metastatic disease to a right axillary lymph node.  A second abnormal appearing lymph node is noted in the right axilla (not biopsied). 6/18/2024 left breast mammo negative.     7/18/2024 Genetic Testing    NEGATIVE: No Clinically Significant Variants Detected  [facility] - A total of 59 genes were evaluated with RNA insight: APC, RICKY, BAP1, BARD1, BMPR1A, BRCA1, BRCA2, BRIP1, CDH1, CDK4, CDKN1B, CDKN2A, CHEK2, DICER1, FH, FLCN, KIF1B, MAX, MEN1, MET, MLH1, MSH2, MSH6, MUTYH, NF1, NTHL1, PALB2, PMS2, POT1, PTEN, RAD51C, RAD51D, RB1, RET, SDHA, SDHAF2, SDHB, SDHC, SDHD, SMAD4, SMARCA4, STK11, LRVL368, TP53, TSC1, TSC2 and VHL (sequencing and deletion/duplication); AXIN2, CTNNA1, EGLN1, HOXB13, KIT, MITF, MSH3, PDGFRA, POLD1 and POLE (sequencing only); EPCAM and GREM1 (deletion/duplication only).     7/19/2024 Observation    Bilateral Breast MRI IMPRESSION:  Continued surgical and oncologic management is recommended for the biopsy proven malignancy of the right breast corresponding to a 3.4 cm mass at 7:00 in the right breast and the biopsy proven metastatic right axillary lymph node. There are approximately 4 enlarged right level 1 lymph nodes. No internal mammary adenopathy.      Indeterminate mass at 12:00 and non-mass enhancement at 9:00 in the right breast. If patient is pursuing breast conservation therapy, two site MRI guided biopsy of the right breast is recommended.      Indeterminate non-mass enhancement at 7:00 in the left breast. MRI guided biopsy is  recommended.      Of note, bilateral biopsies cannot be preformed on the same day as the left side will need a medial approach.      8/9/2024 Biopsy    Left breast MRI-guided biopsy  7 o'clock  Lobular neoplasia (ALH and LCIS)    Concordant. Area of non-mass enhancement measured 8 mm on MRI.     8/16/2024 Biopsy    Right breast MRI-guided biopsy  A. 12 o'clock  Unremarkable ductules and lobules in a background of dense stromal fibrosis  Focal changes suggestive for prior biopsy  Negative for atypia or carcinoma    B. 10 o'clock  Focus of columnar cell change and columnar cell hyperplasia with calcifications  Negative for atypia or carcinoma    Concordant. Continued surgical management for known ipsilateral breast carcinoma recommended.     9/19/2024 Surgery    Right modified radical mastectomy left simple mastectomy immediate reconstruction with Dr. Archibald  RIGHT  Invasive breast carcinoma of no special type (ductal NST / invasive ductal carcinoma)   34 mm  Separate tumor nodule, 11 mm, most compatible with completely replaced lymph node   Grade 2   Invasive carcinoma less than 1 mm from posterior margin   Subdermal invasion by tumor. Perineural invasion by tumor  5/12 Lymph nodes; positive for extranodal extension  Anatomic stage: at least stage IIIA  Prognostic stage: IB    LEFT  Breast tissue with non-invasive lobular neoplasia (ALH / LCIS) and atypical ductal hyperplasia (ADH)      9/19/2024 -  Cancer Staged    Staging form: Breast, AJCC 8th Edition  - Pathologic stage from 9/19/2024: Stage IB (pT2, pN2a, cM0, G2, ER+, NJ+, HER2-, Oncotype DX score: 20) - Signed by Ros Hyman MD on 9/26/2024  Stage prefix: Initial diagnosis  Multigene prognostic tests performed: Oncotype DX  Recurrence score range: Greater than or equal to 11  Histologic grading system: 3 grade system       10/31/2024 -  Chemotherapy    alteplase (CATHFLO), 2 mg, Intracatheter, Every 1 Minute as needed, 4 of 4 cycles  pegfilgrastim (NEULASTA  ONPRO), 6 mg, Subcutaneous, Once, 4 of 4 cycles  Administration: 6 mg (10/31/2024), 6 mg (11/21/2024), 6 mg (12/12/2024)  cyclophosphamide (CYTOXAN) IVPB, 1,020 mg, Intravenous, Once, 4 of 4 cycles  Administration: 1,000 mg (10/31/2024), 1,000 mg (11/21/2024), 1,000 mg (12/12/2024)  DOCEtaxel (TAXOTERE) chemo infusion, 75 mg/m2 = 127.6 mg, Intravenous, Once, 4 of 4 cycles  Administration: 127.6 mg (10/31/2024), 127.6 mg (11/21/2024), 127.6 mg (12/12/2024)     1/22/2025 - 2/26/2025 Radiation    Treatments:  Course: C1  Plan ID Energy Fractions Dose per Fraction (cGy) Dose Correction (cGy) Total Dose Delivered (cGy) Elapsed Days   RtCW_CNI 10X/6X 25 / 25 200 0 5,000 35    Treatment Dates:  1/22/2025 - 2/26/2025.      3/2025 -  Hormone Therapy    Anastrozole 1mg  Dr. Hyman     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)   9/19/2024 Surgery    Right modified radical mastectomy left simple mastectomy immediate reconstruction with Dr. Archibald  RIGHT  Invasive breast carcinoma of no special type (ductal NST / invasive ductal carcinoma)   34 mm  Separate tumor nodule, 11 mm, most compatible with completely replaced lymph node   Grade 2   Invasive carcinoma less than 1 mm from posterior margin   Subdermal invasion by tumor. Perineural invasion by tumor  5/12 Lymph nodes; positive for extranodal extension  Anatomic stage: at least stage IIIA  Prognostic stage: IB    LEFT  Breast tissue with non-invasive lobular neoplasia (ALH / LCIS) and atypical ductal hyperplasia (ADH)      1/22/2025 - 2/26/2025 Radiation    Treatments:  Course: C1  Plan ID Energy Fractions Dose per Fraction (cGy) Dose Correction (cGy) Total Dose Delivered (cGy) Elapsed Days   RtCW_CNI 10X/6X 25 / 25 200 0 5,000 35    Treatment Dates:  1/22/2025 - 2/26/2025.              Review of Systems   Constitutional:  Negative for chills and fever.   HENT:  Negative for ear pain and sore throat.    Eyes:  Negative for pain and visual disturbance.  "  Respiratory:  Negative for cough and shortness of breath.    Cardiovascular:  Negative for chest pain and palpitations.   Gastrointestinal:  Positive for diarrhea. Negative for abdominal pain and vomiting.   Genitourinary:  Negative for dysuria and hematuria.   Musculoskeletal:  Negative for arthralgias and back pain.   Skin:  Negative for color change and rash.   Neurological:  Negative for seizures and syncope.   All other systems reviewed and are negative.    Medical History Reviewed by provider this encounter:  Tobacco  Allergies  Meds  Problems  Med Hx  Surg Hx  Fam Hx     .  Medications Ordered Prior to Encounter[1]   Social History[2]      Objective   /78 (BP Location: Left arm, Patient Position: Sitting, Cuff Size: Adult)   Pulse 94   Temp 98.3 °F (36.8 °C)   Resp 18   Ht 5' 3\" (1.6 m)   Wt 69.4 kg (153 lb)   SpO2 99%   BMI 27.10 kg/m²     Pain Screening:  Pain Score: 0-No pain  ECOG   0  Physical Exam  Constitutional:       General: She is not in acute distress.     Appearance: She is well-developed. She is not diaphoretic.   HENT:      Head: Normocephalic and atraumatic.      Nose: Nose normal.     Eyes:      General: No scleral icterus.        Right eye: No discharge.         Left eye: No discharge.      Conjunctiva/sclera: Conjunctivae normal.      Pupils: Pupils are equal, round, and reactive to light.     Neck:      Thyroid: No thyromegaly.      Vascular: No JVD.      Trachea: No tracheal deviation.     Cardiovascular:      Rate and Rhythm: Normal rate and regular rhythm.      Heart sounds: Normal heart sounds. No murmur heard.     No friction rub.   Pulmonary:      Effort: Pulmonary effort is normal. No respiratory distress.      Breath sounds: Normal breath sounds. No stridor. No wheezing or rales.   Chest:      Chest wall: No tenderness.   Abdominal:      General: Bowel sounds are normal. There is no distension.      Palpations: Abdomen is soft. There is no hepatomegaly or " splenomegaly.      Tenderness: There is no abdominal tenderness. There is no guarding or rebound.     Musculoskeletal:         General: No tenderness or deformity. Normal range of motion.      Cervical back: Normal range of motion and neck supple.   Lymphadenopathy:      Cervical: No cervical adenopathy.     Skin:     General: Skin is warm and dry.      Coloration: Skin is not pale.      Findings: No erythema or rash.     Neurological:      Mental Status: She is alert and oriented to person, place, and time.      Cranial Nerves: No cranial nerve deficit.      Coordination: Coordination normal.      Deep Tendon Reflexes: Reflexes are normal and symmetric.     Psychiatric:         Behavior: Behavior normal.         Thought Content: Thought content normal.         Judgment: Judgment normal.         Labs: I have reviewed the following labs:  Lab Results   Component Value Date/Time    WBC 2.00 (L) 06/28/2025 07:22 AM    RBC 3.81 06/28/2025 07:22 AM    Hemoglobin 13.0 06/28/2025 07:22 AM    Hematocrit 38.4 06/28/2025 07:22 AM     (H) 06/28/2025 07:22 AM    MCH 34.1 06/28/2025 07:22 AM    RDW 13.6 06/28/2025 07:22 AM    Platelets 150 06/28/2025 07:22 AM    Segmented % 56 06/28/2025 07:22 AM    Lymphocytes % 20 06/28/2025 07:22 AM    Monocytes % 13 (H) 06/28/2025 07:22 AM    Eosinophils Relative 7 (H) 06/28/2025 07:22 AM    Basophils Relative 4 (H) 06/28/2025 07:22 AM    Immature Grans % 0 06/28/2025 07:22 AM    Absolute Neutrophils 1.14 (L) 06/28/2025 07:22 AM     Lab Results   Component Value Date/Time    Potassium 4.6 06/28/2025 07:22 AM    Chloride 107 06/28/2025 07:22 AM    CO2 27 06/28/2025 07:22 AM    BUN 11 06/28/2025 07:22 AM    Creatinine 0.79 06/28/2025 07:22 AM    Glucose, Fasting 85 06/28/2025 07:22 AM    Calcium 9.3 06/28/2025 07:22 AM    AST 29 06/28/2025 07:22 AM    ALT 32 06/28/2025 07:22 AM    Alkaline Phosphatase 68 06/28/2025 07:22 AM    Total Protein 7.1 06/28/2025 07:22 AM    Albumin 4.4  "06/28/2025 07:22 AM    Total Bilirubin 0.49 06/28/2025 07:22 AM    eGFR 88 06/28/2025 07:22 AM     Lab Results   Component Value Date/Time    WBC 2.00 (L) 06/28/2025 07:22 AM    RBC 3.81 06/28/2025 07:22 AM    Hemoglobin 13.0 06/28/2025 07:22 AM    Hematocrit 38.4 06/28/2025 07:22 AM     (H) 06/28/2025 07:22 AM    MCH 34.1 06/28/2025 07:22 AM    RDW 13.6 06/28/2025 07:22 AM    Platelets 150 06/28/2025 07:22 AM    Segmented % 56 06/28/2025 07:22 AM    Lymphocytes % 20 06/28/2025 07:22 AM    Monocytes % 13 (H) 06/28/2025 07:22 AM    Eosinophils Relative 7 (H) 06/28/2025 07:22 AM    Basophils Relative 4 (H) 06/28/2025 07:22 AM    Immature Grans % 0 06/28/2025 07:22 AM    Absolute Neutrophils 1.14 (L) 06/28/2025 07:22 AM      Lab Results   Component Value Date/Time    Sodium 142 06/28/2025 07:22 AM    Potassium 4.6 06/28/2025 07:22 AM    Chloride 107 06/28/2025 07:22 AM    CO2 27 06/28/2025 07:22 AM    ANION GAP 8 06/28/2025 07:22 AM    BUN 11 06/28/2025 07:22 AM    Creatinine 0.79 06/28/2025 07:22 AM    Glucose 89 03/21/2025 09:31 AM    Glucose, Fasting 85 06/28/2025 07:22 AM    Calcium 9.3 06/28/2025 07:22 AM    AST 29 06/28/2025 07:22 AM    ALT 32 06/28/2025 07:22 AM    Alkaline Phosphatase 68 06/28/2025 07:22 AM    Total Protein 7.1 06/28/2025 07:22 AM    Albumin 4.4 06/28/2025 07:22 AM    Total Bilirubin 0.49 06/28/2025 07:22 AM    eGFR 88 06/28/2025 07:22 AM      No results found for: \"IRON\", \"CONCFE\", \"FERRITIN\", \"GVTRXSWA03\", \"FOLATE\", \"COPPER\", \"EPOREFLAB\", \"ERYTHROPRO\", \"ESR\", \"CRP\", \"HIVAGAB\", \"HEPATITIS\"   Results for orders placed or performed in visit on 06/28/25   CBC and differential   Result Value Ref Range    WBC 2.00 (L) 4.31 - 10.16 Thousand/uL    RBC 3.81 3.81 - 5.12 Million/uL    Hemoglobin 13.0 11.5 - 15.4 g/dL    Hematocrit 38.4 34.8 - 46.1 %     (H) 82 - 98 fL    MCH 34.1 26.8 - 34.3 pg    MCHC 33.9 31.4 - 37.4 g/dL    RDW 13.6 11.6 - 15.1 %    MPV 9.7 8.9 - 12.7 fL    Platelets " 150 149 - 390 Thousands/uL    nRBC 0 /100 WBCs    Segmented % 56 43 - 75 %    Immature Grans % 0 0 - 2 %    Lymphocytes % 20 14 - 44 %    Monocytes % 13 (H) 4 - 12 %    Eosinophils Relative 7 (H) 0 - 6 %    Basophils Relative 4 (H) 0 - 1 %    Absolute Neutrophils 1.14 (L) 1.85 - 7.62 Thousands/µL    Absolute Immature Grans 0.00 0.00 - 0.20 Thousand/uL    Absolute Lymphocytes 0.40 (L) 0.60 - 4.47 Thousands/µL    Absolute Monocytes 0.25 0.17 - 1.22 Thousand/µL    Eosinophils Absolute 0.13 0.00 - 0.61 Thousand/µL    Basophils Absolute 0.08 0.00 - 0.10 Thousands/µL   Comprehensive metabolic panel   Result Value Ref Range    Sodium 142 135 - 147 mmol/L    Potassium 4.6 3.5 - 5.3 mmol/L    Chloride 107 96 - 108 mmol/L    CO2 27 21 - 32 mmol/L    ANION GAP 8 4 - 13 mmol/L    BUN 11 5 - 25 mg/dL    Creatinine 0.79 0.60 - 1.30 mg/dL    Glucose, Fasting 85 65 - 99 mg/dL    Calcium 9.3 8.4 - 10.2 mg/dL    AST 29 13 - 39 U/L    ALT 32 7 - 52 U/L    Alkaline Phosphatase 68 34 - 104 U/L    Total Protein 7.1 6.4 - 8.4 g/dL    Albumin 4.4 3.5 - 5.0 g/dL    Total Bilirubin 0.49 0.20 - 1.00 mg/dL    eGFR 88 ml/min/1.73sq m   Result Value Ref Range    Magnesium 2.4 1.9 - 2.7 mg/dL                   [1]   Current Outpatient Medications on File Prior to Visit   Medication Sig Dispense Refill    Abemaciclib (Verzenio) 100 MG TABS Take 100 mg by mouth 2 (two) times a day 56 tablet 5    anastrozole (ARIMIDEX) 1 mg tablet Take 1 tablet (1 mg total) by mouth daily 90 tablet 3    loratadine (CLARITIN) 10 mg tablet Take 10 mg by mouth as needed for allergies      cyclobenzaprine (FLEXERIL) 10 mg tablet Take 1 tablet (10 mg total) by mouth 3 (three) times a day as needed for muscle spasms (Patient not taking: Reported on 5/16/2025) 30 tablet 0    dexamethasone (DECADRON) 4 mg tablet Take 2 tablets (8 mg total) by mouth 2 (two) times a day with meals START DAY BEFORE CHEMO DAY OF CHEMO AND DAY AFTER CHEMO EVERY 3 WEEKS FOR FOUR CYCLES (Patient  not taking: Reported on 5/16/2025) 12 tablet 3    ergocalciferol (VITAMIN D2) 50,000 units Take 1 capsule (50,000 Units total) by mouth once a week for 8 doses (Patient not taking: Reported on 7/23/2025) 8 capsule 0    ondansetron (ZOFRAN) 8 mg tablet Take 1 tablet (8 mg total) by mouth every 8 (eight) hours as needed for nausea or vomiting (Patient not taking: Reported on 7/23/2025) 20 tablet 3    oxyCODONE (Roxicodone) 5 immediate release tablet Take 1 tablet (5 mg total) by mouth every 6 (six) hours as needed for severe pain Max Daily Amount: 20 mg (Patient not taking: Reported on 5/16/2025) 10 tablet 0     No current facility-administered medications on file prior to visit.   [2]   Social History  Tobacco Use    Smoking status: Never     Passive exposure: Never    Smokeless tobacco: Never   Vaping Use    Vaping status: Never Used   Substance and Sexual Activity    Alcohol use: Not Currently     Comment: Very rarely, 1 or 2 beers if i do.    Drug use: No    Sexual activity: Yes     Partners: Male     Comment: Tubes tied 16 years ago

## 2025-07-28 ENCOUNTER — APPOINTMENT (OUTPATIENT)
Dept: LAB | Facility: CLINIC | Age: 50
End: 2025-07-28
Payer: COMMERCIAL

## 2025-07-28 ENCOUNTER — HOSPITAL ENCOUNTER (OUTPATIENT)
Dept: INFUSION CENTER | Facility: CLINIC | Age: 50
Discharge: HOME | End: 2025-07-28
Attending: INTERNAL MEDICINE
Payer: COMMERCIAL

## 2025-07-28 DIAGNOSIS — E83.42 HYPOMAGNESEMIA: ICD-10-CM

## 2025-07-28 DIAGNOSIS — Z17.0 MALIGNANT NEOPLASM OF LOWER-OUTER QUADRANT OF RIGHT BREAST OF FEMALE, ESTROGEN RECEPTOR POSITIVE (HCC): Primary | ICD-10-CM

## 2025-07-28 DIAGNOSIS — C50.511 MALIGNANT NEOPLASM OF LOWER-OUTER QUADRANT OF RIGHT BREAST OF FEMALE, ESTROGEN RECEPTOR POSITIVE (HCC): Primary | ICD-10-CM

## 2025-07-28 DIAGNOSIS — Z17.0 MALIGNANT NEOPLASM OF LOWER-OUTER QUADRANT OF RIGHT BREAST OF FEMALE, ESTROGEN RECEPTOR POSITIVE (HCC): ICD-10-CM

## 2025-07-28 DIAGNOSIS — C50.511 MALIGNANT NEOPLASM OF LOWER-OUTER QUADRANT OF RIGHT BREAST OF FEMALE, ESTROGEN RECEPTOR POSITIVE (HCC): ICD-10-CM

## 2025-07-28 LAB
ALBUMIN SERPL BCG-MCNC: 4.3 G/DL (ref 3.5–5)
ALP SERPL-CCNC: 81 U/L (ref 34–104)
ALT SERPL W P-5'-P-CCNC: 16 U/L (ref 7–52)
ANION GAP SERPL CALCULATED.3IONS-SCNC: 8 MMOL/L (ref 4–13)
AST SERPL W P-5'-P-CCNC: 21 U/L (ref 13–39)
BASOPHILS # BLD AUTO: 0.05 THOUSANDS/ÂΜL (ref 0–0.1)
BASOPHILS NFR BLD AUTO: 3 % (ref 0–1)
BILIRUB SERPL-MCNC: 0.59 MG/DL (ref 0.2–1)
BUN SERPL-MCNC: 11 MG/DL (ref 5–25)
CALCIUM SERPL-MCNC: 9.9 MG/DL (ref 8.4–10.2)
CHLORIDE SERPL-SCNC: 105 MMOL/L (ref 96–108)
CO2 SERPL-SCNC: 28 MMOL/L (ref 21–32)
CREAT SERPL-MCNC: 0.95 MG/DL (ref 0.6–1.3)
EOSINOPHIL # BLD AUTO: 0.11 THOUSAND/ÂΜL (ref 0–0.61)
EOSINOPHIL NFR BLD AUTO: 6 % (ref 0–6)
ERYTHROCYTE [DISTWIDTH] IN BLOOD BY AUTOMATED COUNT: 12.2 % (ref 11.6–15.1)
GFR SERPL CREATININE-BSD FRML MDRD: 71 ML/MIN/1.73SQ M
GLUCOSE P FAST SERPL-MCNC: 89 MG/DL (ref 65–99)
HCT VFR BLD AUTO: 38.9 % (ref 34.8–46.1)
HGB BLD-MCNC: 14 G/DL (ref 11.5–15.4)
IMM GRANULOCYTES # BLD AUTO: 0.01 THOUSAND/UL (ref 0–0.2)
IMM GRANULOCYTES NFR BLD AUTO: 1 % (ref 0–2)
LYMPHOCYTES # BLD AUTO: 0.49 THOUSANDS/ÂΜL (ref 0.6–4.47)
LYMPHOCYTES NFR BLD AUTO: 24 % (ref 14–44)
MAGNESIUM SERPL-MCNC: 2 MG/DL (ref 1.9–2.7)
MCH RBC QN AUTO: 34.5 PG (ref 26.8–34.3)
MCHC RBC AUTO-ENTMCNC: 36 G/DL (ref 31.4–37.4)
MCV RBC AUTO: 96 FL (ref 82–98)
MONOCYTES # BLD AUTO: 0.23 THOUSAND/ÂΜL (ref 0.17–1.22)
MONOCYTES NFR BLD AUTO: 11 % (ref 4–12)
NEUTROPHILS # BLD AUTO: 1.12 THOUSANDS/ÂΜL (ref 1.85–7.62)
NEUTS SEG NFR BLD AUTO: 55 % (ref 43–75)
NRBC BLD AUTO-RTO: 0 /100 WBCS
PLATELET # BLD AUTO: 148 THOUSANDS/UL (ref 149–390)
PMV BLD AUTO: 9.9 FL (ref 8.9–12.7)
POTASSIUM SERPL-SCNC: 5.8 MMOL/L (ref 3.5–5.3)
PROT SERPL-MCNC: 7.3 G/DL (ref 6.4–8.4)
RBC # BLD AUTO: 4.06 MILLION/UL (ref 3.81–5.12)
SODIUM SERPL-SCNC: 141 MMOL/L (ref 135–147)
WBC # BLD AUTO: 2.01 THOUSAND/UL (ref 4.31–10.16)

## 2025-07-28 PROCEDURE — 36415 COLL VENOUS BLD VENIPUNCTURE: CPT

## 2025-07-28 PROCEDURE — 85025 COMPLETE CBC W/AUTO DIFF WBC: CPT

## 2025-07-28 PROCEDURE — 83735 ASSAY OF MAGNESIUM: CPT

## 2025-07-28 PROCEDURE — 80053 COMPREHEN METABOLIC PANEL: CPT

## 2025-07-28 PROCEDURE — 96402 CHEMO HORMON ANTINEOPL SQ/IM: CPT

## 2025-07-28 RX ADMIN — LEUPROLIDE ACETATE 7.5 MG: KIT at 09:34

## 2025-07-29 ENCOUNTER — RESULTS FOLLOW-UP (OUTPATIENT)
Dept: HEMATOLOGY ONCOLOGY | Facility: CLINIC | Age: 50
End: 2025-07-29

## 2025-07-29 DIAGNOSIS — E87.5 HYPERKALEMIA: Primary | ICD-10-CM

## 2025-07-30 ENCOUNTER — APPOINTMENT (OUTPATIENT)
Dept: LAB | Facility: CLINIC | Age: 50
End: 2025-07-30
Payer: COMMERCIAL

## 2025-07-30 DIAGNOSIS — E83.42 HYPOMAGNESEMIA: ICD-10-CM

## 2025-07-30 DIAGNOSIS — C50.511 MALIGNANT NEOPLASM OF LOWER-OUTER QUADRANT OF RIGHT BREAST OF FEMALE, ESTROGEN RECEPTOR POSITIVE (HCC): ICD-10-CM

## 2025-07-30 DIAGNOSIS — Z17.0 MALIGNANT NEOPLASM OF LOWER-OUTER QUADRANT OF RIGHT BREAST OF FEMALE, ESTROGEN RECEPTOR POSITIVE (HCC): ICD-10-CM

## 2025-07-30 DIAGNOSIS — Z85.3 PERSONAL HISTORY OF BREAST CANCER: ICD-10-CM

## 2025-07-30 DIAGNOSIS — E87.5 HYPERKALEMIA: ICD-10-CM

## 2025-07-30 PROCEDURE — 80048 BASIC METABOLIC PNL TOTAL CA: CPT

## 2025-07-30 PROCEDURE — 36415 COLL VENOUS BLD VENIPUNCTURE: CPT

## 2025-07-31 LAB
ANION GAP SERPL CALCULATED.3IONS-SCNC: 6 MMOL/L (ref 4–13)
BUN SERPL-MCNC: 10 MG/DL (ref 5–25)
CALCIUM SERPL-MCNC: 9.7 MG/DL (ref 8.4–10.2)
CHLORIDE SERPL-SCNC: 103 MMOL/L (ref 96–108)
CO2 SERPL-SCNC: 29 MMOL/L (ref 21–32)
CREAT SERPL-MCNC: 0.89 MG/DL (ref 0.6–1.3)
GFR SERPL CREATININE-BSD FRML MDRD: 76 ML/MIN/1.73SQ M
GLUCOSE P FAST SERPL-MCNC: 88 MG/DL (ref 65–99)
POTASSIUM SERPL-SCNC: 4.6 MMOL/L (ref 3.5–5.3)
SODIUM SERPL-SCNC: 138 MMOL/L (ref 135–147)

## (undated) DEVICE — TOWEL SURG XR DETECT GREEN STRL RFD

## (undated) DEVICE — LIGACLIP MCA MULTIPLE CLIP APPLIERS, 20 MEDIUM CLIPS: Brand: LIGACLIP

## (undated) DEVICE — GLOVE INDICATOR PI UNDERGLOVE SZ 7 BLUE

## (undated) DEVICE — DRAPE SHEET THREE QUARTER

## (undated) DEVICE — JACKSON-PRATT 100CC BULB RESERVOIR: Brand: CARDINAL HEALTH

## (undated) DEVICE — JP CHANNEL DRAIN 19FR, FULL FLUTES: Brand: JACKSON-PRATT

## (undated) DEVICE — STERILE MUSCLE FLAP PACK: Brand: CARDINAL HEALTH

## (undated) DEVICE — PACK UNIVERSAL DRAPES SUB-Q ICD

## (undated) DEVICE — ANTIBACTERIAL UNDYED BRAIDED (POLYGLACTIN 910), SYNTHETIC ABSORBABLE SUTURE: Brand: COATED VICRYL

## (undated) DEVICE — SUT VICRYL 2-0 SH 27 IN UNDYED J417H

## (undated) DEVICE — SUT STRATAFIX SPIRAL PLUS 3-0 PS-2 30 X 30 CM SXMP2B408

## (undated) DEVICE — PAD GROUNDING DUAL ADULT

## (undated) DEVICE — PROXIMATE SKIN STAPLERS (35 WIDE) CONTAINS 35 STAINLESS STEEL STAPLES (FIXED HEAD): Brand: PROXIMATE

## (undated) DEVICE — BETHLEHEM UNIVERSAL BREAST PK: Brand: CARDINAL HEALTH

## (undated) DEVICE — PLUMEPEN PRO 10FT

## (undated) DEVICE — DRAIN SPONGES,6 PLY: Brand: EXCILON

## (undated) DEVICE — 3M™ TEGADERM™ TRANSPARENT FILM DRESSING FRAME STYLE, 1626W, 4 IN X 4-3/4 IN (10 CM X 12 CM), 50/CT 4CT/CASE: Brand: 3M™ TEGADERM™

## (undated) DEVICE — INVIEW CLEAR LEGGINGS: Brand: CONVERTORS

## (undated) DEVICE — TELFA NON-ADHERENT ABSORBENT DRESSING: Brand: TELFA

## (undated) DEVICE — SUT MONOCRYL 3-0 PS-2 18 IN Y497G

## (undated) DEVICE — INTENDED FOR TISSUE SEPARATION, AND OTHER PROCEDURES THAT REQUIRE A SHARP SURGICAL BLADE TO PUNCTURE OR CUT.: Brand: BARD-PARKER ® CARBON RIB-BACK BLADES

## (undated) DEVICE — 4-PORT MANIFOLD: Brand: NEPTUNE 2

## (undated) DEVICE — PICO 7 SINGLE 10X20CM: Brand: PICO™ 7

## (undated) DEVICE — ANTIBACTERIAL VIOLET BRAIDED (POLYGLACTIN 910), SYNTHETIC ABSORBABLE SUTURE: Brand: COATED VICRYL

## (undated) DEVICE — SUT VICRYL 0 CT-2 18 IN J727D

## (undated) DEVICE — HEMOSTAT POWDER ADSORB SURGICEL 3GM

## (undated) DEVICE — SUT MONOCRYL 3-0 PS-2 27 IN Y427H

## (undated) DEVICE — LIGHT HANDLE COVER SLEEVE DISP BLUE STELLAR

## (undated) DEVICE — ADHESIVE SKIN CLOSURE SYS EXOFIN FUSION 22CM

## (undated) DEVICE — SHEATH, GUIDE, SAVI SCOUT®: Brand: SAVI SCOUT®

## (undated) DEVICE — ACE WRAP 6 IN UNSTERILE

## (undated) DEVICE — SUT SILK 2-0 SH 30 IN K833H

## (undated) DEVICE — SPONGE STICK WITH PVP-I: Brand: KENDALL

## (undated) DEVICE — ELECTRODE BLADE MOD  E-Z CLEAN 6.5IN -0014M

## (undated) DEVICE — ELECTRODE BLADE MOD E-Z CLEAN 2.5IN 6.4CM -0012M

## (undated) DEVICE — 450 ML BOTTLE OF 0.05% CHLORHEXIDINE GLUCONATE IN 99.95% STERILE WATER FOR IRRIGATION, USP AND APPLICATOR.: Brand: IRRISEPT ANTIMICROBIAL WOUND LAVAGE

## (undated) DEVICE — SUT STRATAFIX SMTR PDS PLUS 1 CT-1 30CM SXPP1A435

## (undated) DEVICE — PENCIL SMOKE EVAC TELESCOPING W/TUBING

## (undated) DEVICE — ABDOMINAL PAD: Brand: DERMACEA

## (undated) DEVICE — DECANTER: Brand: UNBRANDED

## (undated) DEVICE — TOWEL SET X-RAY

## (undated) DEVICE — DRAPE TOWEL: Brand: CONVERTORS

## (undated) DEVICE — NEEDLE 23G X 1 1/2 SAFETY-GLIDE THIN WALL

## (undated) DEVICE — GLOVE SRG BIOGEL 7

## (undated) DEVICE — CAUTERY TIP POLISHER: Brand: DEVON

## (undated) DEVICE — SUT ETHILON 2-0 PS 18 IN 585H

## (undated) DEVICE — Device

## (undated) DEVICE — BULB SYRINGE,IRRIGATION WITH PROTECTIVE CAP: Brand: DOVER

## (undated) DEVICE — DRAPE EQUIPMENT RF WAND

## (undated) DEVICE — GLOVE SRG BIOGEL ORTHOPEDIC 7.5

## (undated) DEVICE — 3M™ TEGADERM™ CHG DRESSING 25/CARTON 4 CARTONS/CASE 1659: Brand: TEGADERM™

## (undated) DEVICE — POV-IOD SOLUTION 4OZ BT